# Patient Record
Sex: FEMALE | Race: WHITE | NOT HISPANIC OR LATINO | Employment: OTHER | URBAN - METROPOLITAN AREA
[De-identification: names, ages, dates, MRNs, and addresses within clinical notes are randomized per-mention and may not be internally consistent; named-entity substitution may affect disease eponyms.]

---

## 2017-08-10 ENCOUNTER — OFFICE VISIT (OUTPATIENT)
Dept: OBSTETRICS AND GYNECOLOGY | Facility: CLINIC | Age: 27
End: 2017-08-10
Attending: OBSTETRICS & GYNECOLOGY
Payer: COMMERCIAL

## 2017-08-10 VITALS
DIASTOLIC BLOOD PRESSURE: 80 MMHG | WEIGHT: 126.19 LBS | SYSTOLIC BLOOD PRESSURE: 124 MMHG | BODY MASS INDEX: 21.02 KG/M2 | HEIGHT: 65 IN

## 2017-08-10 DIAGNOSIS — Z01.419 ENCOUNTER FOR GYNECOLOGICAL EXAMINATION: Primary | ICD-10-CM

## 2017-08-10 DIAGNOSIS — N60.19 FIBROCYSTIC BREAST, UNSPECIFIED LATERALITY: ICD-10-CM

## 2017-08-10 DIAGNOSIS — Z11.3 SCREENING FOR VENEREAL DISEASE: ICD-10-CM

## 2017-08-10 DIAGNOSIS — Z12.4 SCREENING FOR MALIGNANT NEOPLASM OF THE CERVIX: ICD-10-CM

## 2017-08-10 PROCEDURE — 87591 N.GONORRHOEAE DNA AMP PROB: CPT

## 2017-08-10 PROCEDURE — 88142 CYTOPATH C/V THIN LAYER: CPT

## 2017-08-10 PROCEDURE — 99385 PREV VISIT NEW AGE 18-39: CPT | Mod: S$GLB,,, | Performed by: OBSTETRICS & GYNECOLOGY

## 2017-08-10 PROCEDURE — 99999 PR PBB SHADOW E&M-NEW PATIENT-LVL III: CPT | Mod: PBBFAC,,, | Performed by: OBSTETRICS & GYNECOLOGY

## 2017-08-10 RX ORDER — NORETHINDRONE ACETATE AND ETHINYL ESTRADIOL, AND FERROUS FUMARATE 1MG-20(24)
1 KIT ORAL DAILY
Qty: 84 CAPSULE | Refills: 3 | Status: SHIPPED | OUTPATIENT
Start: 2017-08-10 | End: 2018-12-10

## 2017-08-10 RX ORDER — ALPRAZOLAM 0.5 MG/1
TABLET, EXTENDED RELEASE ORAL DAILY
COMMUNITY
End: 2020-01-21

## 2017-08-10 RX ORDER — NORETHINDRONE ACETATE AND ETHINYL ESTRADIOL, AND FERROUS FUMARATE 1MG-20(24)
1 KIT ORAL DAILY
Qty: 84 CAPSULE | Refills: 3 | Status: CANCELLED | OUTPATIENT
Start: 2017-08-10

## 2017-08-10 NOTE — PROGRESS NOTES
"CC: Well woman exam    Jane Mann is a 27 y.o. female  presents for a well woman exam.  She is not established.  Referred by a friend.  Recent move back to [facility].  Wants to get on ocps. Has never been on pills and wants to try that first line.  Sexually active wants std cx done.  Normal menses.  S/p gastric sleeve without complication besides having hair loss.      Past Medical History:   Diagnosis Date    Anxiety        Past Surgical History:   Procedure Laterality Date    STOMACH SURGERY      Dr Owens - sleeve gastrectomy - was 190lbs       OB History    Para Term  AB Living   0 0 0 0 0 0   SAB TAB Ectopic Multiple Live Births   0 0 0 0 0             Family History   Problem Relation Age of Onset    Heart attack Father     Hypertension Father        Social History   Substance Use Topics    Smoking status: Former Smoker    Smokeless tobacco: Never Used    Alcohol use Yes      Comment: social       /80   Ht 5' 5" (1.651 m)   Wt 57.2 kg (126 lb 3.4 oz)   LMP 2017 (LMP Unknown)   BMI 21.00 kg/m²     ROS:  GENERAL: Denies weight gain or weight loss. Feeling well overall.   SKIN: Denies rash or lesions.   HEAD: Denies head injury or headache.   NODES: Denies enlarged lymph nodes.   CHEST: Denies chest pain or shortness of breath.   CARDIOVASCULAR: Denies palpitations or left sided chest pain.   ABDOMEN: No abdominal pain, constipation, diarrhea, nausea, vomiting or rectal bleeding.   URINARY: No frequency, dysuria, hematuria, or burning on urination.  REPRODUCTIVE: See HPI.   BREASTS: The patient performs breast self-examination and denies pain, lumps, or nipple discharge.   HEMATOLOGIC: No easy bruisability or excessive bleeding.  MUSCULOSKELETAL: Denies joint pain or swelling.   NEUROLOGIC: Denies syncope or weakness.   PSYCHIATRIC: Denies depression, anxiety or mood swings.    Physical Exam:    APPEARANCE: Well nourished, well developed, in no acute " distress.  AFFECT: WNL, alert and oriented x 3  SKIN: No acne or hirsutism  NECK: Neck symmetric without masses or thyromegaly  NODES: No inguinal, cervical, axillary, or femoral lymph node enlargement  CHEST: Good respiratory effect  ABDOMEN: Soft.  No tenderness or masses.  No hepatosplenomegaly.  No hernias.  BREASTS: Symmetrical, no skin changes or visible lesions.  No palpable masses, nipple discharge bilaterally.  PELVIC: Normal external genitalia without lesions.  Normal hair distribution.  Adequate perineal body, normal urethral meatus.  Vagina moist and well rugated without lesions or discharge.  Cervix pink, without lesions, discharge or tenderness.  No significant cystocele or rectocele.  Bimanual exam shows uterus to be normal size, regular, mobile and nontender.  Adnexa without masses or tenderness.    EXTREMITIES: No edema.    ASSESSMENT AND PLAN  1. Encounter for gynecological examination  norethindrone-e.estradiol-iron (TAYTULLA) 1 mg-20 mcg (24)/75 mg (4) Cap   2. Screening for malignant neoplasm of the cervix  Liquid-based pap smear, screening   3. Screening for venereal disease  C. trachomatis/N. gonorrhoeae by AMP DNA Vagina   4. Fibrocystic breast, unspecified laterality  US Breast Bilateral Complete       Patient was counseled today on A.C.S. Pap guidelines and recommendations for yearly pelvic exams, mammograms and monthly self breast exams; to see her PCP for other health maintenance.     Recommend Dr Carmen Saldivar for hairloss.  Also diffuse fibrocystic breast changes recommend bilateral ultrasound.    Return in about 1 year (around 8/10/2018).

## 2017-08-11 LAB
C TRACH DNA SPEC QL NAA+PROBE: NOT DETECTED
N GONORRHOEA DNA SPEC QL NAA+PROBE: NOT DETECTED

## 2017-08-28 ENCOUNTER — HOSPITAL ENCOUNTER (OUTPATIENT)
Dept: RADIOLOGY | Facility: OTHER | Age: 27
Discharge: HOME | End: 2017-08-28
Attending: OBSTETRICS & GYNECOLOGY
Payer: COMMERCIAL

## 2017-08-28 DIAGNOSIS — N60.19 FIBROCYSTIC BREAST, UNSPECIFIED LATERALITY: ICD-10-CM

## 2017-08-28 PROCEDURE — 76642 ULTRASOUND BREAST LIMITED: CPT | Mod: 26,50,, | Performed by: RADIOLOGY

## 2017-08-28 PROCEDURE — 76641 ULTRASOUND BREAST COMPLETE: CPT | Mod: TC,50

## 2017-08-28 PROCEDURE — 76642 ULTRASOUND BREAST LIMITED: CPT | Mod: TC,50

## 2018-01-19 ENCOUNTER — TELEPHONE (OUTPATIENT)
Dept: OBSTETRICS AND GYNECOLOGY | Facility: CLINIC | Age: 28
End: 2018-01-19

## 2018-01-19 NOTE — TELEPHONE ENCOUNTER
The list that came through you could not read.I had pt send a different way and would write some of the medications done. Miguel, Levonorgest-est% eth est, phoebe, amethia lo, camrese, ashlyna, quaasense, lyza, norethindrone oral.

## 2018-01-19 NOTE — TELEPHONE ENCOUNTER
Wax pt calling, pt states has love her [place] but coast to much [phone] and wants to know if we can send something else in for her today.Pt needs to start pills this weekend and she emailed me a list but to small to see.I will email pt back to get a better copy for whats covered with Bc/Bs.Please call pt either way if something can be sent in or not.Pt # [phone]

## 2018-09-25 ENCOUNTER — TELEPHONE (OUTPATIENT)
Dept: OBSTETRICS AND GYNECOLOGY | Facility: CLINIC | Age: 28
End: 2018-09-25

## 2018-09-25 NOTE — TELEPHONE ENCOUNTER
Pt missed Saturday's pill in week 3 and doubled up Sunday.  She had unprotected sex Saturday night and Sunday, recommended Plan B as she could have ovulated.  She has been on Blisovi for the past year. Since starting it she has had vaginal dryness that causes painful intercourse.         Allergies and pharmacy UTD

## 2018-09-25 NOTE — TELEPHONE ENCOUNTER
Plan b is best taken within 72 hours and at this point it is almost too late however you can take plan b up to 5 days after unprotected sex but just has lower success.   Recommend use condoms and take upt in 2 weeks.

## 2018-09-28 RX ORDER — NORETHINDRONE ACETATE AND ETHINYL ESTRADIOL 1MG-20(24)
KIT ORAL
Qty: 84 TABLET | Refills: 2 | Status: SHIPPED | OUTPATIENT
Start: 2018-09-28 | End: 2018-12-10 | Stop reason: SDUPTHER

## 2018-12-10 ENCOUNTER — OFFICE VISIT (OUTPATIENT)
Dept: OBSTETRICS AND GYNECOLOGY | Facility: CLINIC | Age: 28
End: 2018-12-10
Attending: OBSTETRICS & GYNECOLOGY
Payer: COMMERCIAL

## 2018-12-10 VITALS
HEIGHT: 65 IN | BODY MASS INDEX: 20.2 KG/M2 | SYSTOLIC BLOOD PRESSURE: 120 MMHG | WEIGHT: 121.25 LBS | DIASTOLIC BLOOD PRESSURE: 72 MMHG

## 2018-12-10 DIAGNOSIS — Z01.419 ENCOUNTER FOR GYNECOLOGICAL EXAMINATION: Primary | ICD-10-CM

## 2018-12-10 DIAGNOSIS — Z30.9 ENCOUNTER FOR CONTRACEPTIVE MANAGEMENT, UNSPECIFIED TYPE: ICD-10-CM

## 2018-12-10 PROCEDURE — 99999 PR PBB SHADOW E&M-EST. PATIENT-LVL II: CPT | Mod: PBBFAC,,, | Performed by: OBSTETRICS & GYNECOLOGY

## 2018-12-10 PROCEDURE — 99395 PREV VISIT EST AGE 18-39: CPT | Mod: S$GLB,,, | Performed by: OBSTETRICS & GYNECOLOGY

## 2018-12-10 RX ORDER — SERTRALINE HYDROCHLORIDE 50 MG/1
TABLET, FILM COATED ORAL
COMMUNITY
Start: 2018-10-25 | End: 2020-01-21

## 2018-12-10 NOTE — PROGRESS NOTES
"CC: Well woman exam    Jane Mann is a 28 y.o. female  presents for a well woman exam.  On continuous ocps bc doesn't want a cycle, h/o normal menses. Getting  in April in [facility].  Wants to get pregnant maybe a year later.  Worried bc friends dealing with infertility.     C/o dryness with sex    Past Medical History:   Diagnosis Date    Anxiety        Past Surgical History:   Procedure Laterality Date    STOMACH SURGERY      Dr Owens - sleeve gastrectomy - was 190lbs       OB History    Para Term  AB Living   0 0 0 0 0 0   SAB TAB Ectopic Multiple Live Births   0 0 0 0 0             Family History   Problem Relation Age of Onset    Heart attack Father     Hypertension Father     Breast cancer Neg Hx     Colon cancer Neg Hx     Ovarian cancer Neg Hx        Social History     Tobacco Use    Smoking status: Former Smoker    Smokeless tobacco: Never Used   Substance Use Topics    Alcohol use: Yes     Comment: social    Drug use: No       /72   Ht 5' 5" (1.651 m)   Wt 55 kg (121 lb 4.1 oz)   LMP  (LMP Unknown) Comment: no cycles with OCPs  BMI 20.18 kg/m²     ROS:  GENERAL: Denies weight gain or weight loss. Feeling well overall.   SKIN: Denies rash or lesions.   HEAD: Denies head injury or headache.   NODES: Denies enlarged lymph nodes.   CHEST: Denies chest pain or shortness of breath.   CARDIOVASCULAR: Denies palpitations or left sided chest pain.   ABDOMEN: No abdominal pain, constipation, diarrhea, nausea, vomiting or rectal bleeding.   URINARY: No frequency, dysuria, hematuria, or burning on urination.  REPRODUCTIVE: See HPI.   BREASTS: The patient performs breast self-examination and denies pain, lumps, or nipple discharge.   HEMATOLOGIC: No easy bruisability or excessive bleeding.  MUSCULOSKELETAL: Denies joint pain or swelling.   NEUROLOGIC: Denies syncope or weakness.   PSYCHIATRIC: Denies depression, anxiety or mood swings.    Physical " Exam:    APPEARANCE: Well nourished, well developed, in no acute distress.  AFFECT: WNL, alert and oriented x 3  SKIN: No acne or hirsutism  NECK: Neck symmetric without masses or thyromegaly  NODES: No inguinal, cervical, axillary, or femoral lymph node enlargement  CHEST: Good respiratory effect  ABDOMEN: Soft.  No tenderness or masses.  No hepatosplenomegaly.  No hernias.  BREASTS: Symmetrical, no skin changes or visible lesions.  No palpable masses, nipple discharge bilaterally.  PELVIC: Normal external genitalia without lesions.  Normal hair distribution.  Adequate perineal body, normal urethral meatus.  Vagina moist and well rugated without lesions or discharge.  Cervix pink, without lesions, discharge or tenderness.  No significant cystocele or rectocele.  Bimanual exam shows uterus to be normal size, regular, mobile and nontender.  Adnexa without masses or tenderness.    EXTREMITIES: No edema.    ASSESSMENT AND PLAN  1. Encounter for gynecological examination     2. Encounter for contraceptive management, unspecified type  norethindrone-e.estradiol-iron (BLISOVI 24 FE) 1 mg-20 mcg (24)/75 mg (4) Oral per tablet       Patient was counseled today on A.C.S. Pap guidelines and recommendations for yearly pelvic exams, mammograms and monthly self breast exams; to see her PCP for other health maintenance.     Stay ocps when ready come in for preconceptual counseling.  Use almond oil.    Follow-up in about 1 year (around 12/10/2019).

## 2019-10-03 ENCOUNTER — OFFICE VISIT (OUTPATIENT)
Dept: OBSTETRICS AND GYNECOLOGY | Facility: CLINIC | Age: 29
End: 2019-10-03
Attending: OBSTETRICS & GYNECOLOGY
Payer: COMMERCIAL

## 2019-10-03 ENCOUNTER — LAB VISIT (OUTPATIENT)
Dept: LAB | Facility: OTHER | Age: 29
End: 2019-10-03
Attending: OBSTETRICS & GYNECOLOGY
Payer: COMMERCIAL

## 2019-10-03 VITALS — DIASTOLIC BLOOD PRESSURE: 80 MMHG | BODY MASS INDEX: 22.38 KG/M2 | WEIGHT: 134.5 LBS | SYSTOLIC BLOOD PRESSURE: 110 MMHG

## 2019-10-03 DIAGNOSIS — Z00.00 ROUTINE GENERAL MEDICAL EXAMINATION AT A HEALTH CARE FACILITY: ICD-10-CM

## 2019-10-03 DIAGNOSIS — Z12.4 PAP SMEAR FOR CERVICAL CANCER SCREENING: ICD-10-CM

## 2019-10-03 DIAGNOSIS — Z01.419 ENCOUNTER FOR GYNECOLOGICAL EXAMINATION: Primary | ICD-10-CM

## 2019-10-03 LAB
ALBUMIN SERPL BCP-MCNC: 4.2 G/DL (ref 3.5–5.2)
ALP SERPL-CCNC: 72 U/L (ref 55–135)
ALT SERPL W/O P-5'-P-CCNC: 27 U/L (ref 10–44)
ANION GAP SERPL CALC-SCNC: 9 MMOL/L (ref 8–16)
AST SERPL-CCNC: 26 U/L (ref 10–40)
BASOPHILS # BLD AUTO: 0.07 K/UL (ref 0–0.2)
BASOPHILS NFR BLD: 0.9 % (ref 0–1.9)
BILIRUB SERPL-MCNC: 0.7 MG/DL (ref 0.1–1)
BUN SERPL-MCNC: 13 MG/DL (ref 6–20)
CALCIUM SERPL-MCNC: 9.7 MG/DL (ref 8.7–10.5)
CHLORIDE SERPL-SCNC: 102 MMOL/L (ref 95–110)
CO2 SERPL-SCNC: 25 MMOL/L (ref 23–29)
CREAT SERPL-MCNC: 0.8 MG/DL (ref 0.5–1.4)
DIFFERENTIAL METHOD: NORMAL
EOSINOPHIL # BLD AUTO: 0.2 K/UL (ref 0–0.5)
EOSINOPHIL NFR BLD: 2.5 % (ref 0–8)
ERYTHROCYTE [DISTWIDTH] IN BLOOD BY AUTOMATED COUNT: 12.2 % (ref 11.5–14.5)
EST. GFR  (AFRICAN AMERICAN): >60 ML/MIN/1.73 M^2
EST. GFR  (NON AFRICAN AMERICAN): >60 ML/MIN/1.73 M^2
GLUCOSE SERPL-MCNC: 79 MG/DL (ref 70–110)
HCT VFR BLD AUTO: 39 % (ref 37–48.5)
HGB BLD-MCNC: 12.6 G/DL (ref 12–16)
IMM GRANULOCYTES # BLD AUTO: 0.02 K/UL (ref 0–0.04)
IMM GRANULOCYTES NFR BLD AUTO: 0.2 % (ref 0–0.5)
LYMPHOCYTES # BLD AUTO: 2.7 K/UL (ref 1–4.8)
LYMPHOCYTES NFR BLD: 32.8 % (ref 18–48)
MCH RBC QN AUTO: 30.3 PG (ref 27–31)
MCHC RBC AUTO-ENTMCNC: 32.3 G/DL (ref 32–36)
MCV RBC AUTO: 94 FL (ref 82–98)
MONOCYTES # BLD AUTO: 0.7 K/UL (ref 0.3–1)
MONOCYTES NFR BLD: 8.7 % (ref 4–15)
NEUTROPHILS # BLD AUTO: 4.4 K/UL (ref 1.8–7.7)
NEUTROPHILS NFR BLD: 54.9 % (ref 38–73)
NRBC BLD-RTO: 0 /100 WBC
PLATELET # BLD AUTO: 311 K/UL (ref 150–350)
PMV BLD AUTO: 11 FL (ref 9.2–12.9)
POTASSIUM SERPL-SCNC: 5.1 MMOL/L (ref 3.5–5.1)
PROT SERPL-MCNC: 7.8 G/DL (ref 6–8.4)
RBC # BLD AUTO: 4.16 M/UL (ref 4–5.4)
SODIUM SERPL-SCNC: 136 MMOL/L (ref 136–145)
TSH SERPL DL<=0.005 MIU/L-ACNC: 1.79 UIU/ML (ref 0.4–4)
WBC # BLD AUTO: 8.08 K/UL (ref 3.9–12.7)

## 2019-10-03 PROCEDURE — 36415 COLL VENOUS BLD VENIPUNCTURE: CPT

## 2019-10-03 PROCEDURE — 80053 COMPREHEN METABOLIC PANEL: CPT

## 2019-10-03 PROCEDURE — 99999 PR PBB SHADOW E&M-EST. PATIENT-LVL III: ICD-10-PCS | Mod: PBBFAC,,, | Performed by: OBSTETRICS & GYNECOLOGY

## 2019-10-03 PROCEDURE — 82306 VITAMIN D 25 HYDROXY: CPT

## 2019-10-03 PROCEDURE — 85025 COMPLETE CBC W/AUTO DIFF WBC: CPT

## 2019-10-03 PROCEDURE — 88175 CYTOPATH C/V AUTO FLUID REDO: CPT

## 2019-10-03 PROCEDURE — 82607 VITAMIN B-12: CPT

## 2019-10-03 PROCEDURE — 99999 PR PBB SHADOW E&M-EST. PATIENT-LVL III: CPT | Mod: PBBFAC,,, | Performed by: OBSTETRICS & GYNECOLOGY

## 2019-10-03 PROCEDURE — 82746 ASSAY OF FOLIC ACID SERUM: CPT

## 2019-10-03 PROCEDURE — 84443 ASSAY THYROID STIM HORMONE: CPT

## 2019-10-03 PROCEDURE — 99395 PR PREVENTIVE VISIT,EST,18-39: ICD-10-PCS | Mod: S$GLB,,, | Performed by: OBSTETRICS & GYNECOLOGY

## 2019-10-03 PROCEDURE — 99395 PREV VISIT EST AGE 18-39: CPT | Mod: S$GLB,,, | Performed by: OBSTETRICS & GYNECOLOGY

## 2019-10-03 NOTE — PROGRESS NOTES
CC: Well woman exam    Jane Muller is a 29 y.o. female  presents for a well woman exam.      Just off ocps for one month. Taking pnv, wants to get pregnant.  She is also on extra supplements due to history of gastric sleeve    Currently not working and her  works as an .    Past Medical History:   Diagnosis Date    Anxiety        Past Surgical History:   Procedure Laterality Date    STOMACH SURGERY  2017    Dr Owens - sleeve gastrectomy - was 190lbs       OB History    Para Term  AB Living   0 0 0 0 0 0   SAB TAB Ectopic Multiple Live Births   0 0 0 0 0       Family History   Problem Relation Age of Onset    Heart attack Father     Hypertension Father     No Known Problems Brother     Breast cancer Neg Hx     Colon cancer Neg Hx     Ovarian cancer Neg Hx        Social History     Tobacco Use    Smoking status: Former Smoker    Smokeless tobacco: Never Used   Substance Use Topics    Alcohol use: Yes     Comment: social    Drug use: No       /80   Wt 61 kg (134 lb 7.7 oz)   LMP 2019   BMI 22.38 kg/m²     ROS:  GENERAL: Denies weight gain or weight loss. Feeling well overall.   SKIN: Denies rash or lesions.   HEAD: Denies head injury or headache.   NODES: Denies enlarged lymph nodes.   CHEST: Denies chest pain or shortness of breath.   CARDIOVASCULAR: Denies palpitations or left sided chest pain.   ABDOMEN: No abdominal pain, constipation, diarrhea, nausea, vomiting or rectal bleeding.   URINARY: No frequency, dysuria, hematuria, or burning on urination.  REPRODUCTIVE: See HPI.   BREASTS: The patient performs breast self-examination and denies pain, lumps, or nipple discharge.   HEMATOLOGIC: No easy bruisability or excessive bleeding.  MUSCULOSKELETAL: Denies joint pain or swelling.   NEUROLOGIC: Denies syncope or weakness.   PSYCHIATRIC: Denies depression, anxiety or mood swings.    Physical Exam:    APPEARANCE: Well nourished, well developed,  in no acute distress.  AFFECT: WNL, alert and oriented x 3  SKIN: No acne or hirsutism  NECK: Neck symmetric without masses or thyromegaly  NODES: No inguinal, cervical, axillary, or femoral lymph node enlargement  CHEST: Good respiratory effect  ABDOMEN: Soft.  No tenderness or masses.  No hepatosplenomegaly.  No hernias.  BREASTS: Symmetrical, no skin changes or visible lesions.  No palpable masses, nipple discharge bilaterally.  PELVIC: Normal external genitalia without lesions.  Normal hair distribution.  Adequate perineal body, normal urethral meatus.  Vagina moist and well rugated without lesions or discharge.  Cervix pink, without lesions, discharge or tenderness.  No significant cystocele or rectocele.  Bimanual exam shows uterus to be normal size, regular, mobile and nontender.  Adnexa without masses or tenderness.    EXTREMITIES: No edema.    ASSESSMENT AND PLAN  1. Encounter for gynecological examination     2. Routine general medical examination at a health care facility  Vitamin D    CBC auto differential    Comprehensive metabolic panel    TSH    FOLATE    VITAMIN B12   3. Pap smear for cervical cancer screening  Liquid-based pap smear, screening       Patient was counseled today on A.C.S. Pap guidelines and recommendations for yearly pelvic exams, mammograms and monthly self breast exams; to see her PCP for other health maintenance.     Follow up in about 1 year (around 10/3/2020).

## 2019-10-04 LAB
25(OH)D3+25(OH)D2 SERPL-MCNC: 29 NG/ML (ref 30–96)
FOLATE SERPL-MCNC: 7.3 NG/ML (ref 4–24)
VIT B12 SERPL-MCNC: 555 PG/ML (ref 210–950)

## 2019-10-10 ENCOUNTER — PATIENT MESSAGE (OUTPATIENT)
Dept: OBSTETRICS AND GYNECOLOGY | Facility: CLINIC | Age: 29
End: 2019-10-10

## 2019-10-10 DIAGNOSIS — N91.2 ABSENT MENSES: Primary | ICD-10-CM

## 2019-10-10 NOTE — TELEPHONE ENCOUNTER
Pt was seen on 10/3/19 and is trying to conceive. She d/c her OCP two months ago and has not gotten her period since. She states that she did bleed right after d/c OCP but thinks it was just a withdraw bleed and not a period. She was on this OCP for 2 years and prior to being on contraception she was having regular periods. She has taken at home UPT and they have come back negative.

## 2019-10-10 NOTE — TELEPHONE ENCOUNTER
Left detailed voicemail informing pt about Dr. Brandt wanting her to come in office next week for ultrasound. Offered appt on 10/18 at 2pm in [place] for u/s and then to f/u with Dr. Brandt afterwards. Also sent message to pt on portal.

## 2020-01-06 ENCOUNTER — PATIENT MESSAGE (OUTPATIENT)
Dept: OBSTETRICS AND GYNECOLOGY | Facility: CLINIC | Age: 30
End: 2020-01-06

## 2020-01-21 ENCOUNTER — OFFICE VISIT (OUTPATIENT)
Dept: OBSTETRICS AND GYNECOLOGY | Facility: CLINIC | Age: 30
End: 2020-01-21
Payer: COMMERCIAL

## 2020-01-21 VITALS
SYSTOLIC BLOOD PRESSURE: 110 MMHG | HEIGHT: 65 IN | BODY MASS INDEX: 22.77 KG/M2 | WEIGHT: 136.69 LBS | DIASTOLIC BLOOD PRESSURE: 60 MMHG

## 2020-01-21 DIAGNOSIS — Z78.9 TRYING TO GET PREGNANT: Primary | ICD-10-CM

## 2020-01-21 PROCEDURE — 99213 PR OFFICE/OUTPT VISIT, EST, LEVL III, 20-29 MIN: ICD-10-PCS | Mod: S$GLB,,, | Performed by: OBSTETRICS & GYNECOLOGY

## 2020-01-21 PROCEDURE — 3008F PR BODY MASS INDEX (BMI) DOCUMENTED: ICD-10-PCS | Mod: CPTII,S$GLB,, | Performed by: OBSTETRICS & GYNECOLOGY

## 2020-01-21 PROCEDURE — 99213 OFFICE O/P EST LOW 20 MIN: CPT | Mod: S$GLB,,, | Performed by: OBSTETRICS & GYNECOLOGY

## 2020-01-21 PROCEDURE — 3008F BODY MASS INDEX DOCD: CPT | Mod: CPTII,S$GLB,, | Performed by: OBSTETRICS & GYNECOLOGY

## 2020-01-21 PROCEDURE — 99999 PR PBB SHADOW E&M-EST. PATIENT-LVL III: ICD-10-PCS | Mod: PBBFAC,,, | Performed by: OBSTETRICS & GYNECOLOGY

## 2020-01-21 PROCEDURE — 99999 PR PBB SHADOW E&M-EST. PATIENT-LVL III: CPT | Mod: PBBFAC,,, | Performed by: OBSTETRICS & GYNECOLOGY

## 2020-01-21 NOTE — PROGRESS NOTES
"CC: trying to get pregnant x 6months    Jane Muller is a 29 y.o. female  presents with her mother.  Worried because has not gotten pregnant.  Her cycle length has become more prolonged.  34d, 32d, 36d, currently CD32,  Not getting +opk, no mucous production around ovulation.  ,  healthy no children. No h/o stds.  No postcoital bleeding, taking pnv.     Past Medical History:   Diagnosis Date    Anxiety        Past Surgical History:   Procedure Laterality Date    STOMACH SURGERY      Dr Owens - sleeve gastrectomy - was 190lbs       OB History    Para Term  AB Living   0 0 0 0 0 0   SAB TAB Ectopic Multiple Live Births   0 0 0 0 0       Family History   Problem Relation Age of Onset    Heart attack Father     Hypertension Father     No Known Problems Brother     Breast cancer Neg Hx     Colon cancer Neg Hx     Ovarian cancer Neg Hx        Social History     Tobacco Use    Smoking status: Former Smoker    Smokeless tobacco: Never Used   Substance Use Topics    Alcohol use: Yes     Comment: social    Drug use: No       /60   Ht 5' 5" (1.651 m)   Wt 62 kg (136 lb 11 oz)   LMP 2019   BMI 22.75 kg/m²     ROS:  GENERAL: Denies weight gain or weight loss. Feeling well overall.   SKIN: Denies rash or lesions.   HEAD: Denies head injury or headache.   NODES: Denies enlarged lymph nodes.   CHEST: Denies chest pain or shortness of breath.   CARDIOVASCULAR: Denies palpitations or left sided chest pain.   ABDOMEN: No abdominal pain, constipation, diarrhea, nausea, vomiting or rectal bleeding.   URINARY: No frequency, dysuria, hematuria, or burning on urination.  REPRODUCTIVE: See HPI.   BREASTS: denies pain, lumps, or nipple discharge.   HEMATOLOGIC: No easy bruisability or excessive bleeding.  MUSCULOSKELETAL: Denies joint pain or swelling.   NEUROLOGIC: Denies syncope or weakness.   PSYCHIATRIC: Denies depression, anxiety or mood swings.    Physical " Exam:    APPEARANCE: Well nourished, well developed, in no acute distress.  AFFECT: WNL, alert and oriented x 3  SKIN: No acne or hirsutism  NECK: Neck symmetric without masses or thyromegaly  NODES: No inguinal, cervical, axillary, or femoral lymph node enlargement  CHEST: Good respiratory effect  ABDOMEN: Soft.  No tenderness or masses.  No hepatosplenomegaly.  No hernias.  EXTREMITIES: No edema.    ASSESSMENT AND PLAN    Jane was seen today for consult.    Diagnoses and all orders for this visit:    Trying to get pregnant    concerned for pcos. Has been on ocps for a few years and never had cycle issues.  Would like to be aggressive and see AN before her mom leaves [facility]. Info given    Follow up if symptoms worsen or fail to improve.

## 2020-02-27 ENCOUNTER — TELEPHONE (OUTPATIENT)
Dept: OBSTETRICS AND GYNECOLOGY | Facility: CLINIC | Age: 30
End: 2020-02-27

## 2020-02-27 NOTE — TELEPHONE ENCOUNTER
[facility] pt, has an appt scheduled on 03/05 for routine OB care from Dr. Correa. Pt ios requesting [id number] order. Pt was told she could do testing starting at 9 wks. Please call pt and advise, thank you.

## 2020-02-27 NOTE — TELEPHONE ENCOUNTER
Pt. Coming in for her ist OB visit, and wants MT21 I explained to her we will go over it with dr. Brandt when she comes in, she said okay.

## 2020-03-05 ENCOUNTER — PROCEDURE VISIT (OUTPATIENT)
Dept: OBSTETRICS AND GYNECOLOGY | Facility: CLINIC | Age: 30
End: 2020-03-05
Attending: OBSTETRICS & GYNECOLOGY
Payer: COMMERCIAL

## 2020-03-05 VITALS
DIASTOLIC BLOOD PRESSURE: 60 MMHG | BODY MASS INDEX: 22.69 KG/M2 | WEIGHT: 136.38 LBS | SYSTOLIC BLOOD PRESSURE: 100 MMHG

## 2020-03-05 DIAGNOSIS — Z34.90 PREGNANCY, UNSPECIFIED GESTATIONAL AGE: ICD-10-CM

## 2020-03-05 DIAGNOSIS — Z34.90 PREGNANCY, UNSPECIFIED GESTATIONAL AGE: Primary | ICD-10-CM

## 2020-03-05 DIAGNOSIS — Z98.84 HISTORY OF WEIGHT LOSS SURGERY: ICD-10-CM

## 2020-03-05 DIAGNOSIS — O21.9 NAUSEA/VOMITING IN PREGNANCY: ICD-10-CM

## 2020-03-05 DIAGNOSIS — Z36.89 ESTABLISH GESTATIONAL AGE, ULTRASOUND: ICD-10-CM

## 2020-03-05 PROCEDURE — 0502F SUBSEQUENT PRENATAL CARE: CPT | Mod: CPTII,S$GLB,, | Performed by: OBSTETRICS & GYNECOLOGY

## 2020-03-05 PROCEDURE — 99999 PR PBB SHADOW E&M-EST. PATIENT-LVL II: CPT | Mod: PBBFAC,,, | Performed by: OBSTETRICS & GYNECOLOGY

## 2020-03-05 PROCEDURE — 76801 PR US, OB <14WKS, TRANSABD, SINGLE GESTATION: ICD-10-PCS | Mod: S$GLB,,, | Performed by: OBSTETRICS & GYNECOLOGY

## 2020-03-05 PROCEDURE — 0502F PR SUBSEQUENT PRENATAL CARE: ICD-10-PCS | Mod: CPTII,S$GLB,, | Performed by: OBSTETRICS & GYNECOLOGY

## 2020-03-05 PROCEDURE — 87086 URINE CULTURE/COLONY COUNT: CPT

## 2020-03-05 PROCEDURE — 99999 PR PBB SHADOW E&M-EST. PATIENT-LVL II: ICD-10-PCS | Mod: PBBFAC,,, | Performed by: OBSTETRICS & GYNECOLOGY

## 2020-03-05 PROCEDURE — 87491 CHLMYD TRACH DNA AMP PROBE: CPT

## 2020-03-05 PROCEDURE — 76801 OB US < 14 WKS SINGLE FETUS: CPT | Mod: S$GLB,,, | Performed by: OBSTETRICS & GYNECOLOGY

## 2020-03-05 RX ORDER — ONDANSETRON 8 MG/1
8 TABLET, ORALLY DISINTEGRATING ORAL EVERY 6 HOURS PRN
Qty: 30 TABLET | Refills: 0 | Status: SHIPPED | OUTPATIENT
Start: 2020-03-05 | End: 2020-08-06

## 2020-03-05 NOTE — PROGRESS NOTES
Dates by today's ultrasound.  +nausea/vomiting rx for bonjesta.  Wants materna 21 and labs.  Had carrier screen and was negative.

## 2020-03-06 LAB
C TRACH DNA SPEC QL NAA+PROBE: NOT DETECTED
N GONORRHOEA DNA SPEC QL NAA+PROBE: NOT DETECTED

## 2020-03-07 LAB — BACTERIA UR CULT: NO GROWTH

## 2020-03-09 ENCOUNTER — LAB VISIT (OUTPATIENT)
Dept: LAB | Facility: OTHER | Age: 30
End: 2020-03-09
Attending: OBSTETRICS & GYNECOLOGY
Payer: COMMERCIAL

## 2020-03-09 DIAGNOSIS — Z34.90 PREGNANCY, UNSPECIFIED GESTATIONAL AGE: ICD-10-CM

## 2020-03-09 LAB
25(OH)D3+25(OH)D2 SERPL-MCNC: 26 NG/ML (ref 30–96)
ABO + RH BLD: NORMAL
ALBUMIN SERPL BCP-MCNC: 3.9 G/DL (ref 3.5–5.2)
ALP SERPL-CCNC: 56 U/L (ref 55–135)
ALT SERPL W/O P-5'-P-CCNC: 15 U/L (ref 10–44)
ANION GAP SERPL CALC-SCNC: 10 MMOL/L (ref 8–16)
AST SERPL-CCNC: 19 U/L (ref 10–40)
BASOPHILS # BLD AUTO: 0.05 K/UL (ref 0–0.2)
BASOPHILS NFR BLD: 0.5 % (ref 0–1.9)
BILIRUB SERPL-MCNC: 0.4 MG/DL (ref 0.1–1)
BLD GP AB SCN CELLS X3 SERPL QL: NORMAL
BUN SERPL-MCNC: 10 MG/DL (ref 6–20)
CALCIUM SERPL-MCNC: 9.9 MG/DL (ref 8.7–10.5)
CHLORIDE SERPL-SCNC: 105 MMOL/L (ref 95–110)
CO2 SERPL-SCNC: 23 MMOL/L (ref 23–29)
CREAT SERPL-MCNC: 0.7 MG/DL (ref 0.5–1.4)
DIFFERENTIAL METHOD: NORMAL
EOSINOPHIL # BLD AUTO: 0.2 K/UL (ref 0–0.5)
EOSINOPHIL NFR BLD: 1.5 % (ref 0–8)
ERYTHROCYTE [DISTWIDTH] IN BLOOD BY AUTOMATED COUNT: 12.2 % (ref 11.5–14.5)
EST. GFR  (AFRICAN AMERICAN): >60 ML/MIN/1.73 M^2
EST. GFR  (NON AFRICAN AMERICAN): >60 ML/MIN/1.73 M^2
FOLATE SERPL-MCNC: 16.3 NG/ML (ref 4–24)
GLUCOSE SERPL-MCNC: 81 MG/DL (ref 70–110)
GLUCOSE SERPL-MCNC: 81 MG/DL (ref 70–110)
HCT VFR BLD AUTO: 38 % (ref 37–48.5)
HGB BLD-MCNC: 12.2 G/DL (ref 12–16)
IMM GRANULOCYTES # BLD AUTO: 0.04 K/UL (ref 0–0.04)
IMM GRANULOCYTES NFR BLD AUTO: 0.4 % (ref 0–0.5)
LYMPHOCYTES # BLD AUTO: 2.2 K/UL (ref 1–4.8)
LYMPHOCYTES NFR BLD: 21.1 % (ref 18–48)
MAGNESIUM SERPL-MCNC: 2 MG/DL (ref 1.6–2.6)
MCH RBC QN AUTO: 30.1 PG (ref 27–31)
MCHC RBC AUTO-ENTMCNC: 32.1 G/DL (ref 32–36)
MCV RBC AUTO: 94 FL (ref 82–98)
MONOCYTES # BLD AUTO: 0.8 K/UL (ref 0.3–1)
MONOCYTES NFR BLD: 7.8 % (ref 4–15)
NEUTROPHILS # BLD AUTO: 7.1 K/UL (ref 1.8–7.7)
NEUTROPHILS NFR BLD: 68.7 % (ref 38–73)
NRBC BLD-RTO: 0 /100 WBC
PLATELET # BLD AUTO: 267 K/UL (ref 150–350)
PMV BLD AUTO: 11.2 FL (ref 9.2–12.9)
POTASSIUM SERPL-SCNC: 3.8 MMOL/L (ref 3.5–5.1)
PROT SERPL-MCNC: 7.7 G/DL (ref 6–8.4)
RBC # BLD AUTO: 4.05 M/UL (ref 4–5.4)
SODIUM SERPL-SCNC: 138 MMOL/L (ref 136–145)
T3FREE SERPL-MCNC: 3.6 PG/ML (ref 2.3–4.2)
T4 FREE SERPL-MCNC: 0.93 NG/DL (ref 0.71–1.51)
TSH SERPL DL<=0.005 MIU/L-ACNC: 1.1 UIU/ML (ref 0.4–4)
VIT B12 SERPL-MCNC: 628 PG/ML (ref 210–950)
WBC # BLD AUTO: 10.34 K/UL (ref 3.9–12.7)

## 2020-03-09 PROCEDURE — 87340 HEPATITIS B SURFACE AG IA: CPT

## 2020-03-09 PROCEDURE — 80053 COMPREHEN METABOLIC PANEL: CPT

## 2020-03-09 PROCEDURE — 86762 RUBELLA ANTIBODY: CPT

## 2020-03-09 PROCEDURE — 82607 VITAMIN B-12: CPT

## 2020-03-09 PROCEDURE — 36415 COLL VENOUS BLD VENIPUNCTURE: CPT

## 2020-03-09 PROCEDURE — 82306 VITAMIN D 25 HYDROXY: CPT

## 2020-03-09 PROCEDURE — 83735 ASSAY OF MAGNESIUM: CPT

## 2020-03-09 PROCEDURE — 84481 FREE ASSAY (FT-3): CPT

## 2020-03-09 PROCEDURE — 86850 RBC ANTIBODY SCREEN: CPT

## 2020-03-09 PROCEDURE — 84439 ASSAY OF FREE THYROXINE: CPT

## 2020-03-09 PROCEDURE — 82746 ASSAY OF FOLIC ACID SERUM: CPT

## 2020-03-09 PROCEDURE — 86592 SYPHILIS TEST NON-TREP QUAL: CPT

## 2020-03-09 PROCEDURE — 85025 COMPLETE CBC W/AUTO DIFF WBC: CPT

## 2020-03-09 PROCEDURE — 86703 HIV-1/HIV-2 1 RESULT ANTBDY: CPT

## 2020-03-09 PROCEDURE — 84443 ASSAY THYROID STIM HORMONE: CPT

## 2020-03-10 ENCOUNTER — TELEPHONE (OUTPATIENT)
Dept: OBSTETRICS AND GYNECOLOGY | Facility: CLINIC | Age: 30
End: 2020-03-10

## 2020-03-10 LAB
HBV SURFACE AG SERPL QL IA: NEGATIVE
HIV 1+2 AB+HIV1 P24 AG SERPL QL IA: NEGATIVE
RPR SER QL: NORMAL
RUBV IGG SER-ACNC: 13.6 IU/ML
RUBV IGG SER-IMP: REACTIVE

## 2020-03-10 NOTE — TELEPHONE ENCOUNTER
----- Message from Erwin Brandt MD sent at 3/10/2020  4:28 PM CDT -----  Call patient make sure she got below message:     Your labs are normal except mildly low Vitamin D, if you are on a supplement you can continue.  If not can take Vit D3 5000 units daily, [employer] made is a brand you can trust.   Dr. Brandt

## 2020-03-11 ENCOUNTER — TELEPHONE (OUTPATIENT)
Dept: OBSTETRICS AND GYNECOLOGY | Facility: CLINIC | Age: 30
End: 2020-03-11

## 2020-03-16 ENCOUNTER — PATIENT MESSAGE (OUTPATIENT)
Dept: OBSTETRICS AND GYNECOLOGY | Facility: CLINIC | Age: 30
End: 2020-03-16

## 2020-03-16 NOTE — TELEPHONE ENCOUNTER
Pt. Notified with MT21 Negative results, and pt. Ask for the gender (Male) over the phone and wanted a copy mailed to her home address.

## 2020-03-18 ENCOUNTER — PATIENT MESSAGE (OUTPATIENT)
Dept: OBSTETRICS AND GYNECOLOGY | Facility: CLINIC | Age: 30
End: 2020-03-18

## 2020-03-18 ENCOUNTER — PATIENT MESSAGE (OUTPATIENT)
Dept: ADMINISTRATIVE | Facility: OTHER | Age: 30
End: 2020-03-18

## 2020-03-23 ENCOUNTER — TELEPHONE (OUTPATIENT)
Dept: OBSTETRICS AND GYNECOLOGY | Facility: CLINIC | Age: 30
End: 2020-03-23

## 2020-03-23 ENCOUNTER — PATIENT MESSAGE (OUTPATIENT)
Dept: OBSTETRICS AND GYNECOLOGY | Facility: CLINIC | Age: 30
End: 2020-03-23

## 2020-03-23 DIAGNOSIS — Z34.90 PREGNANCY, UNSPECIFIED GESTATIONAL AGE: Primary | ICD-10-CM

## 2020-03-23 NOTE — TELEPHONE ENCOUNTER
schedule her as a televisit for her next visit.  Not need to come in to unnecessarily expose herself.

## 2020-03-23 NOTE — TELEPHONE ENCOUNTER
L/M to call me back,   Pt. Needs telemed appt. April 3 and she will need a financial call from Ana or Zaira.

## 2020-03-23 NOTE — TELEPHONE ENCOUNTER
See Message:     Patient demanded visit and u/s , she said she didn/t care if she had to pay for it. She was not happy, and refused Telemed appt.

## 2020-03-26 ENCOUNTER — ROUTINE PRENATAL (OUTPATIENT)
Dept: OBSTETRICS AND GYNECOLOGY | Facility: CLINIC | Age: 30
End: 2020-03-26
Payer: COMMERCIAL

## 2020-03-26 ENCOUNTER — PROCEDURE VISIT (OUTPATIENT)
Dept: OBSTETRICS AND GYNECOLOGY | Facility: CLINIC | Age: 30
End: 2020-03-26
Payer: COMMERCIAL

## 2020-03-26 VITALS
DIASTOLIC BLOOD PRESSURE: 60 MMHG | WEIGHT: 135.94 LBS | SYSTOLIC BLOOD PRESSURE: 100 MMHG | BODY MASS INDEX: 22.62 KG/M2

## 2020-03-26 DIAGNOSIS — Z34.01 ENCOUNTER FOR SUPERVISION OF NORMAL FIRST PREGNANCY IN FIRST TRIMESTER: Primary | ICD-10-CM

## 2020-03-26 DIAGNOSIS — Z34.90 PREGNANCY, UNSPECIFIED GESTATIONAL AGE: ICD-10-CM

## 2020-03-26 PROCEDURE — 99499 NO LOS: ICD-10-PCS | Mod: S$GLB,,, | Performed by: PEDIATRICS

## 2020-03-26 PROCEDURE — 76801 PR US, OB <14WKS, TRANSABD, SINGLE GESTATION: ICD-10-PCS | Mod: S$GLB,,, | Performed by: PEDIATRICS

## 2020-03-26 PROCEDURE — 99499 UNLISTED E&M SERVICE: CPT | Mod: S$GLB,,, | Performed by: PEDIATRICS

## 2020-03-26 PROCEDURE — 76801 OB US < 14 WKS SINGLE FETUS: CPT | Mod: S$GLB,,, | Performed by: PEDIATRICS

## 2020-03-26 PROCEDURE — 99999 PR PBB SHADOW E&M-EST. PATIENT-LVL II: CPT | Mod: PBBFAC,,, | Performed by: OBSTETRICS & GYNECOLOGY

## 2020-03-26 PROCEDURE — 0502F PR SUBSEQUENT PRENATAL CARE: ICD-10-PCS | Mod: CPTII,S$GLB,, | Performed by: OBSTETRICS & GYNECOLOGY

## 2020-03-26 PROCEDURE — 0502F SUBSEQUENT PRENATAL CARE: CPT | Mod: CPTII,S$GLB,, | Performed by: OBSTETRICS & GYNECOLOGY

## 2020-03-26 PROCEDURE — 99999 PR PBB SHADOW E&M-EST. PATIENT-LVL II: ICD-10-PCS | Mod: PBBFAC,,, | Performed by: OBSTETRICS & GYNECOLOGY

## 2020-04-18 ENCOUNTER — PATIENT MESSAGE (OUTPATIENT)
Dept: OBSTETRICS AND GYNECOLOGY | Facility: CLINIC | Age: 30
End: 2020-04-18

## 2020-05-05 ENCOUNTER — ROUTINE PRENATAL (OUTPATIENT)
Dept: OBSTETRICS AND GYNECOLOGY | Facility: CLINIC | Age: 30
End: 2020-05-05
Payer: COMMERCIAL

## 2020-05-05 VITALS — SYSTOLIC BLOOD PRESSURE: 116 MMHG | WEIGHT: 138.25 LBS | BODY MASS INDEX: 23 KG/M2 | DIASTOLIC BLOOD PRESSURE: 68 MMHG

## 2020-05-05 DIAGNOSIS — Z34.02 ENCOUNTER FOR SUPERVISION OF NORMAL FIRST PREGNANCY IN SECOND TRIMESTER: Primary | ICD-10-CM

## 2020-05-05 DIAGNOSIS — Z3A.17 17 WEEKS GESTATION OF PREGNANCY: ICD-10-CM

## 2020-05-05 DIAGNOSIS — Z36.89 ENCOUNTER FOR FETAL ANATOMIC SURVEY: ICD-10-CM

## 2020-05-05 PROCEDURE — 0502F SUBSEQUENT PRENATAL CARE: CPT | Mod: CPTII,S$GLB,, | Performed by: OBSTETRICS & GYNECOLOGY

## 2020-05-05 PROCEDURE — 99999 PR PBB SHADOW E&M-EST. PATIENT-LVL II: CPT | Mod: PBBFAC,,, | Performed by: OBSTETRICS & GYNECOLOGY

## 2020-05-05 PROCEDURE — 99999 PR PBB SHADOW E&M-EST. PATIENT-LVL II: ICD-10-PCS | Mod: PBBFAC,,, | Performed by: OBSTETRICS & GYNECOLOGY

## 2020-05-05 PROCEDURE — 0502F PR SUBSEQUENT PRENATAL CARE: ICD-10-PCS | Mod: CPTII,S$GLB,, | Performed by: OBSTETRICS & GYNECOLOGY

## 2020-05-05 NOTE — PROGRESS NOTES
No complaints. Anatomy u/s ordered. Patient will download [employer] and do Connected MOM visit in 2 weeks.

## 2020-05-21 ENCOUNTER — PROCEDURE VISIT (OUTPATIENT)
Dept: MATERNAL FETAL MEDICINE | Facility: CLINIC | Age: 30
End: 2020-05-21
Attending: OBSTETRICS & GYNECOLOGY
Payer: COMMERCIAL

## 2020-05-21 DIAGNOSIS — Z36.89 ENCOUNTER FOR FETAL ANATOMIC SURVEY: ICD-10-CM

## 2020-05-21 PROCEDURE — 76805 OB US >/= 14 WKS SNGL FETUS: CPT | Mod: S$GLB,,, | Performed by: OBSTETRICS & GYNECOLOGY

## 2020-05-21 PROCEDURE — 76805 PR US, OB 14+WKS, TRANSABD, SINGLE GESTATION: ICD-10-PCS | Mod: S$GLB,,, | Performed by: OBSTETRICS & GYNECOLOGY

## 2020-05-22 ENCOUNTER — PATIENT MESSAGE (OUTPATIENT)
Dept: OBSTETRICS AND GYNECOLOGY | Facility: CLINIC | Age: 30
End: 2020-05-22

## 2020-06-18 ENCOUNTER — ROUTINE PRENATAL (OUTPATIENT)
Dept: OBSTETRICS AND GYNECOLOGY | Facility: CLINIC | Age: 30
End: 2020-06-18
Attending: OBSTETRICS & GYNECOLOGY
Payer: COMMERCIAL

## 2020-06-18 ENCOUNTER — LAB VISIT (OUTPATIENT)
Dept: LAB | Facility: OTHER | Age: 30
End: 2020-06-18
Attending: OBSTETRICS & GYNECOLOGY
Payer: COMMERCIAL

## 2020-06-18 VITALS
SYSTOLIC BLOOD PRESSURE: 110 MMHG | BODY MASS INDEX: 23.97 KG/M2 | DIASTOLIC BLOOD PRESSURE: 60 MMHG | WEIGHT: 144.06 LBS

## 2020-06-18 DIAGNOSIS — Z34.02 ENCOUNTER FOR SUPERVISION OF NORMAL FIRST PREGNANCY IN SECOND TRIMESTER: ICD-10-CM

## 2020-06-18 DIAGNOSIS — Z34.90 PREGNANCY, UNSPECIFIED GESTATIONAL AGE: ICD-10-CM

## 2020-06-18 DIAGNOSIS — Z34.90 PREGNANCY, UNSPECIFIED GESTATIONAL AGE: Primary | ICD-10-CM

## 2020-06-18 LAB
25(OH)D3+25(OH)D2 SERPL-MCNC: 34 NG/ML (ref 30–96)
FOLATE SERPL-MCNC: 14.3 NG/ML (ref 4–24)
GLUCOSE SERPL-MCNC: 188 MG/DL (ref 70–140)
MAGNESIUM SERPL-MCNC: 1.8 MG/DL (ref 1.6–2.6)

## 2020-06-18 PROCEDURE — 82306 VITAMIN D 25 HYDROXY: CPT

## 2020-06-18 PROCEDURE — 82950 GLUCOSE TEST: CPT

## 2020-06-18 PROCEDURE — 99999 PR PBB SHADOW E&M-EST. PATIENT-LVL II: ICD-10-PCS | Mod: PBBFAC,,, | Performed by: OBSTETRICS & GYNECOLOGY

## 2020-06-18 PROCEDURE — 82746 ASSAY OF FOLIC ACID SERUM: CPT

## 2020-06-18 PROCEDURE — 36415 COLL VENOUS BLD VENIPUNCTURE: CPT

## 2020-06-18 PROCEDURE — 0502F SUBSEQUENT PRENATAL CARE: CPT | Mod: CPTII,S$GLB,, | Performed by: OBSTETRICS & GYNECOLOGY

## 2020-06-18 PROCEDURE — 83921 ORGANIC ACID SINGLE QUANT: CPT

## 2020-06-18 PROCEDURE — 0502F PR SUBSEQUENT PRENATAL CARE: ICD-10-PCS | Mod: CPTII,S$GLB,, | Performed by: OBSTETRICS & GYNECOLOGY

## 2020-06-18 PROCEDURE — 83735 ASSAY OF MAGNESIUM: CPT

## 2020-06-18 PROCEDURE — 99999 PR PBB SHADOW E&M-EST. PATIENT-LVL II: CPT | Mod: PBBFAC,,, | Performed by: OBSTETRICS & GYNECOLOGY

## 2020-06-19 ENCOUNTER — TELEPHONE (OUTPATIENT)
Dept: OBSTETRICS AND GYNECOLOGY | Facility: CLINIC | Age: 30
End: 2020-06-19

## 2020-06-19 DIAGNOSIS — O24.419 GESTATIONAL DIABETES MELLITUS (GDM) IN SECOND TRIMESTER, GESTATIONAL DIABETES METHOD OF CONTROL UNSPECIFIED: ICD-10-CM

## 2020-06-19 NOTE — TELEPHONE ENCOUNTER
Left detailed message and spoke to mother for 18 minutes giving reassurance and future plan of action.  Not emergent will discuss Monday.

## 2020-06-22 PROBLEM — O24.419 GESTATIONAL DIABETES MELLITUS (GDM) IN SECOND TRIMESTER: Status: ACTIVE | Noted: 2020-06-22

## 2020-06-22 NOTE — TELEPHONE ENCOUNTER
Spoke with pt. And I told her Diabetes Management is going to call her to set her up for diabetes screenings and educaiton

## 2020-06-22 NOTE — TELEPHONE ENCOUNTER
Spoke to pt discussed her labs.  Needs blood sugar monitoring and diabetic teaching, please set patient up

## 2020-06-23 ENCOUNTER — CLINICAL SUPPORT (OUTPATIENT)
Dept: DIABETES | Facility: CLINIC | Age: 30
End: 2020-06-23
Attending: OBSTETRICS & GYNECOLOGY
Payer: COMMERCIAL

## 2020-06-23 DIAGNOSIS — O24.419 GESTATIONAL DIABETES MELLITUS (GDM) IN SECOND TRIMESTER, GESTATIONAL DIABETES METHOD OF CONTROL UNSPECIFIED: ICD-10-CM

## 2020-06-23 PROCEDURE — G0108 PR DIAB MANAGE TRN  PER INDIV: ICD-10-PCS | Mod: 95,,, | Performed by: DIETITIAN, REGISTERED

## 2020-06-23 PROCEDURE — G0108 DIAB MANAGE TRN  PER INDIV: HCPCS | Mod: 95,,, | Performed by: DIETITIAN, REGISTERED

## 2020-06-23 NOTE — PROGRESS NOTES
Diabetes Care Specialist Virtual Visit Note   It was in the patient's best interest to receive diabetes self-management education and support services in this format to prevent the exposure to COVID-19.        The patient location is: home   The chief complaint leading to consultation is: Diabetes  Visit type: audiovisual  Total time spent with patient: 60 min   Each patient to whom he or she provides medical services by telemedicine is:  (1) informed of the relationship between the physician and patient and the respective role of any other health care provider with respect to management of the patient; and (2) notified that he or she may decline to receive medical services by telemedicine and may withdraw from such care at any time.    Diabetes Education  Author: Rema Galdamez RD  Date: 6/23/2020    Diabetes Care Management Summary  Diabetes Education Record Assessment/Progress: Initial  Current Diabetes Risk Level: Moderate     Last A1c: No results found for: HGBA1C  Last visit with Diabetes Educator: Last Education Visit: Not Found      Diabetes Type  Diabetes Type : Gestational    Diabetes History  Diabetes Diagnosis: 0-1 year  Current Treatment: Diet    Health Maintenance was reviewed today with patient. Discussed with patient importance of routine eye exams, foot exams/foot care, blood work (i.e.: A1c, microalbumin, and lipid), dental visits, yearly flu vaccine, and pneumonia vaccine as indicated by PCP. Patient verbalized understanding.     Health Maintenance Topics with due status: Not Due       Topic Last Completion Date    Pap Smear 10/03/2019    Influenza Vaccine Not Due     Health Maintenance Due   Topic Date Due    Lipid Panel  1991    TETANUS VACCINE  02/21/2009       Nutrition  Meal Planning: 3 meals per day, water, snacks between meal, eats out seldom(unable to eat meat, beef leads to emesis. Able to eat some chicken. Is a very healthy eater)  What type of beverages do you drink?:  water  Meal Plan 24 Hour Recall - Breakfast: Unable to eat breakfast this am due to nausea, may eat a banana or fresh fruit in the morning or boiled eggs when she is not sick  Meal Plan 24 Hour Recall - Lunch: is able to eat lunch the last 2 weeks, eats salads with beans  Meal Plan 24 Hour Recall - Dinner: quinioa with chicken and veg  Meal Plan 24 Hour Recall - Snack: apple with peanut butter    Monitoring   Self Monitoring : Not SMBG at this time  Blood Glucose Logs: No  Do you use a personal continuous glucose monitor?: No  In the last month, how often have you had a low blood sugar reaction?: never  Can you tell when your blood sugar is too high?: no    Exercise   Exercise Type: ()  Intensity: Moderate  Frequency: 3-5 Times per week(5 times per week)  Duration: 45 min(45mins - 1 hr)    Current Diabetes Treatment   Current Treatment: Diet    Social History  Preferred Learning Method: Web Based  Primary Support: Spouse  Educational Level: College Graduate  Occupation: stay at home mom  Smoking Status: Never a Smoker  Alcohol Use: Never                                Barriers to Change  Barriers to Change: None  Learning Challenges : None    Readiness to Learn   Readiness to Learn : Eager    Cultural Influences  Cultural Influences: No    Diabetes Education Assessment/Progress  Diabetes Disease Process (diabetes disease process and treatment options): Discussion, Needs Instruction, Instructed, Individual Session, Written Materials Provided(Ed on IR and insulin needs during pregnancy)  Nutrition (Incorporating nutritional management into one's lifestyle): Discussion, Demonstration, Needs Instruction, Individual Session, Instructed, Written Materials Provided(Ed on carb counting, label reading and meal and snack planning.)  Physical Activity (incorporating physical activity into one's lifestyle): Discussion, Comprehends Key Points, Written Materials Provided, Individual Session, Instructed(Works out  with a  5 days per week. Ed on benfits of exercise. Stressed do not exercise on an empty stomach)  Medications (states correct name, dose, onset, peak, duration, side effects & timing of meds): Discussion, Individual Session, Instructed, Needs Review(Reviewed medication options)  Monitoring (monitoring blood glucose/other parameters & using results): Demonstration, Discussion, Instructed, Video, Individual Session, Needs Instruction, Written Materials Provided(Demonstrated meter and BG testing technique. Ed on SMBG 4 times daily, target BG ranges, bring logs to all clinic visits and BG pattern recognition)  Acute Complications (preventing, detecting, and treating acute complications): Discussion, Needs Instruction, Individual Session, Instructed, Written Materials Provided(Ed on hypo and hyperglycemia prevention, s/s and treatment)  Chronic Complications (preventing, detecting, and treating chronic complications): Discussion, Needs Instruction, Instructed, Individual Session, Written Materials Provided(Ed on tight BG control to limit/prevent complications during and after pregnancy. Ed on T2DM prevention)  Clinical (diabetes, other pertinent medical history, and relevant comorbidities reviewed during visit): Discussion, Needs Instruction, Instructed, Individual Session, Written Materials Provided(Reviewd GTT with goal range)  Cognitive (knowledge of self-management skills, functional health literacy): Discussion, Needs Review, Instructed, Individual Session, Written Materials Provided  Psychosocial (emotional response to diabetes): Discussion, Comprehends Key Points, Individual Session, Written Materials Provided, Instructed, Needs Review(Was very surprised with diagnosis. She is very eagar to control BG and have a healthy pregnancy and baby)  Diabetes Distress and Support Systems: Discussion, Needs Review, Individual Session, Instructed, Written Materials Provided(Has very supportive  )  Behavioral (readiness for change, lifestyle practices, self-care behaviors): Discussion, Needs Instruction, Individual Session, Instructed, Written Materials Provided    Goals  Patient has selected/evaluated goals during today's session: Yes, selected  Healthy Eating: Set(Eat 30-45 g of carbs at breafast and 45-60g of carbs at lunch and dinner)  Start Date: 06/23/20  Target Date: 07/14/20  Monitoring: Set(SMBG 4 times daily and bring logs to all clinic visits.)  Start Date: 06/23/20  Target Date: 07/07/20              Diabetes Meal Plan  Restrictions: Restricted Carbohydrate  Calories: 2200  Carbohydrate Per Meal: 30-45g, 45-60g  Carbohydrate Per Snack : 15-30g  Fat: 88-97g  Protein: 88-99g    Today's Self-Management Care Plan was developed with the patient's input and is based on barriers identified during today's assessment.    The long and short-term goals in the care plan were written with the patient/caregiver's input. The patient has agreed to work toward these goals to improve her overall diabetes control.      The patient received a copy of today's self-management plan and verbalized understanding of the care plan, goals, and all of today's instructions.      The patient was encouraged to communicate with her physician and care team regarding her condition(s) and treatment.  I provided the patient with my contact information today and encouraged her to contact me via phone or patient portal as needed.     Education Units of Time   Time Spent: 60 min

## 2020-06-24 DIAGNOSIS — O24.419 GESTATIONAL DIABETES MELLITUS (GDM) IN SECOND TRIMESTER, GESTATIONAL DIABETES METHOD OF CONTROL UNSPECIFIED: Primary | ICD-10-CM

## 2020-06-24 LAB — METHYLMALONATE SERPL-SCNC: 0.3 UMOL/L

## 2020-06-24 RX ORDER — DEXTROSE 4 G
1 TABLET,CHEWABLE ORAL 4 TIMES DAILY
Qty: 1 EACH | Refills: 0 | Status: SHIPPED | OUTPATIENT
Start: 2020-06-24 | End: 2020-06-25

## 2020-06-24 RX ORDER — LANCETS
1 EACH MISCELLANEOUS 4 TIMES DAILY
Qty: 200 EACH | Refills: 1 | Status: ON HOLD | OUTPATIENT
Start: 2020-06-24 | End: 2020-10-02 | Stop reason: HOSPADM

## 2020-06-24 NOTE — TELEPHONE ENCOUNTER
L/M stating waiting for  In house to sign RX for her glucometer, to check with her drug store later today.

## 2020-06-29 ENCOUNTER — TELEPHONE (OUTPATIENT)
Dept: OBSTETRICS AND GYNECOLOGY | Facility: CLINIC | Age: 30
End: 2020-06-29

## 2020-06-29 RX ORDER — LANCETS
1 EACH MISCELLANEOUS 4 TIMES DAILY
Qty: 200 EACH | Refills: 3 | Status: SHIPPED | OUTPATIENT
Start: 2020-06-29 | End: 2020-08-06

## 2020-06-29 RX ORDER — INSULIN PUMP SYRINGE, 3 ML
EACH MISCELLANEOUS
Qty: 1 EACH | Refills: 0 | Status: ON HOLD | OUTPATIENT
Start: 2020-06-29 | End: 2020-10-02 | Stop reason: HOSPADM

## 2020-07-09 ENCOUNTER — ROUTINE PRENATAL (OUTPATIENT)
Dept: OBSTETRICS AND GYNECOLOGY | Facility: CLINIC | Age: 30
End: 2020-07-09
Attending: OBSTETRICS & GYNECOLOGY
Payer: COMMERCIAL

## 2020-07-09 VITALS
BODY MASS INDEX: 25.06 KG/M2 | WEIGHT: 150.56 LBS | SYSTOLIC BLOOD PRESSURE: 110 MMHG | DIASTOLIC BLOOD PRESSURE: 60 MMHG

## 2020-07-09 DIAGNOSIS — Z34.02 ENCOUNTER FOR SUPERVISION OF NORMAL FIRST PREGNANCY IN SECOND TRIMESTER: Primary | ICD-10-CM

## 2020-07-09 DIAGNOSIS — O24.419 GESTATIONAL DIABETES MELLITUS (GDM) IN SECOND TRIMESTER, GESTATIONAL DIABETES METHOD OF CONTROL UNSPECIFIED: ICD-10-CM

## 2020-07-09 PROCEDURE — 0502F SUBSEQUENT PRENATAL CARE: CPT | Mod: CPTII,S$GLB,, | Performed by: OBSTETRICS & GYNECOLOGY

## 2020-07-09 PROCEDURE — 0502F PR SUBSEQUENT PRENATAL CARE: ICD-10-PCS | Mod: CPTII,S$GLB,, | Performed by: OBSTETRICS & GYNECOLOGY

## 2020-07-09 PROCEDURE — 99999 PR PBB SHADOW E&M-EST. PATIENT-LVL III: CPT | Mod: PBBFAC,,, | Performed by: OBSTETRICS & GYNECOLOGY

## 2020-07-09 PROCEDURE — 99999 PR PBB SHADOW E&M-EST. PATIENT-LVL III: ICD-10-PCS | Mod: PBBFAC,,, | Performed by: OBSTETRICS & GYNECOLOGY

## 2020-07-09 NOTE — PROGRESS NOTES
fastings 70-92,   2 hr pp 100-122.      Had one time 176 but had hummus and meghan without a protein.     Ultrasound at 32 weeks for gDM.

## 2020-08-06 ENCOUNTER — ROUTINE PRENATAL (OUTPATIENT)
Dept: OBSTETRICS AND GYNECOLOGY | Facility: CLINIC | Age: 30
End: 2020-08-06
Attending: OBSTETRICS & GYNECOLOGY
Payer: COMMERCIAL

## 2020-08-06 VITALS — SYSTOLIC BLOOD PRESSURE: 110 MMHG | BODY MASS INDEX: 26.3 KG/M2 | DIASTOLIC BLOOD PRESSURE: 70 MMHG | WEIGHT: 158.06 LBS

## 2020-08-06 DIAGNOSIS — Z34.03 ENCOUNTER FOR SUPERVISION OF NORMAL FIRST PREGNANCY IN THIRD TRIMESTER: Primary | ICD-10-CM

## 2020-08-06 PROCEDURE — 0502F SUBSEQUENT PRENATAL CARE: CPT | Mod: CPTII,S$GLB,, | Performed by: OBSTETRICS & GYNECOLOGY

## 2020-08-06 PROCEDURE — 99999 PR PBB SHADOW E&M-EST. PATIENT-LVL III: CPT | Mod: PBBFAC,,, | Performed by: OBSTETRICS & GYNECOLOGY

## 2020-08-06 PROCEDURE — 99999 PR PBB SHADOW E&M-EST. PATIENT-LVL III: ICD-10-PCS | Mod: PBBFAC,,, | Performed by: OBSTETRICS & GYNECOLOGY

## 2020-08-06 PROCEDURE — 0502F PR SUBSEQUENT PRENATAL CARE: ICD-10-PCS | Mod: CPTII,S$GLB,, | Performed by: OBSTETRICS & GYNECOLOGY

## 2020-08-06 RX ORDER — CHOLECALCIFEROL (VITAMIN D3) 25 MCG
1000 TABLET ORAL DAILY
COMMUNITY
End: 2022-03-07

## 2020-08-06 RX ORDER — MAGNESIUM 30 MG
TABLET ORAL ONCE
Status: ON HOLD | COMMUNITY
End: 2020-10-02 | Stop reason: HOSPADM

## 2020-08-21 ENCOUNTER — PROCEDURE VISIT (OUTPATIENT)
Dept: RADIOLOGY | Facility: CLINIC | Age: 30
End: 2020-08-21
Attending: OBSTETRICS & GYNECOLOGY
Payer: COMMERCIAL

## 2020-08-21 ENCOUNTER — ROUTINE PRENATAL (OUTPATIENT)
Dept: OBSTETRICS AND GYNECOLOGY | Facility: CLINIC | Age: 30
End: 2020-08-21
Attending: OBSTETRICS & GYNECOLOGY
Payer: COMMERCIAL

## 2020-08-21 VITALS
WEIGHT: 162.56 LBS | BODY MASS INDEX: 27.06 KG/M2 | DIASTOLIC BLOOD PRESSURE: 68 MMHG | SYSTOLIC BLOOD PRESSURE: 112 MMHG

## 2020-08-21 DIAGNOSIS — Z34.03 ENCOUNTER FOR SUPERVISION OF NORMAL FIRST PREGNANCY IN THIRD TRIMESTER: Primary | ICD-10-CM

## 2020-08-21 DIAGNOSIS — Z23 NEED FOR TDAP VACCINATION: Primary | ICD-10-CM

## 2020-08-21 DIAGNOSIS — Z23 NEED FOR TDAP VACCINATION: ICD-10-CM

## 2020-08-21 DIAGNOSIS — O24.419 GESTATIONAL DIABETES MELLITUS (GDM) IN SECOND TRIMESTER, GESTATIONAL DIABETES METHOD OF CONTROL UNSPECIFIED: ICD-10-CM

## 2020-08-21 PROCEDURE — 76816 OB US FOLLOW-UP PER FETUS: CPT | Mod: S$GLB,,, | Performed by: OBSTETRICS & GYNECOLOGY

## 2020-08-21 PROCEDURE — 76816 PR  US,PREGNANT UTERUS,F/U,TRANSABD APP: ICD-10-PCS | Mod: S$GLB,,, | Performed by: OBSTETRICS & GYNECOLOGY

## 2020-08-21 PROCEDURE — 99999 PR PBB SHADOW E&M-EST. PATIENT-LVL I: ICD-10-PCS | Mod: PBBFAC,,,

## 2020-08-21 PROCEDURE — 99999 PR PBB SHADOW E&M-EST. PATIENT-LVL I: CPT | Mod: PBBFAC,,,

## 2020-08-21 PROCEDURE — 90471 TDAP VACCINE GREATER THAN OR EQUAL TO 7YO IM: ICD-10-PCS | Mod: S$GLB,,, | Performed by: OBSTETRICS & GYNECOLOGY

## 2020-08-21 PROCEDURE — 99999 PR PBB SHADOW E&M-EST. PATIENT-LVL III: ICD-10-PCS | Mod: PBBFAC,,, | Performed by: OBSTETRICS & GYNECOLOGY

## 2020-08-21 PROCEDURE — 99999 PR PBB SHADOW E&M-EST. PATIENT-LVL III: CPT | Mod: PBBFAC,,, | Performed by: OBSTETRICS & GYNECOLOGY

## 2020-08-21 PROCEDURE — 0502F SUBSEQUENT PRENATAL CARE: CPT | Mod: CPTII,S$GLB,, | Performed by: OBSTETRICS & GYNECOLOGY

## 2020-08-21 PROCEDURE — 90471 IMMUNIZATION ADMIN: CPT | Mod: S$GLB,,, | Performed by: OBSTETRICS & GYNECOLOGY

## 2020-08-21 PROCEDURE — 90715 TDAP VACCINE 7 YRS/> IM: CPT | Mod: S$GLB,,, | Performed by: OBSTETRICS & GYNECOLOGY

## 2020-08-21 PROCEDURE — 90715 TDAP VACCINE GREATER THAN OR EQUAL TO 7YO IM: ICD-10-PCS | Mod: S$GLB,,, | Performed by: OBSTETRICS & GYNECOLOGY

## 2020-08-21 PROCEDURE — 0502F PR SUBSEQUENT PRENATAL CARE: ICD-10-PCS | Mod: CPTII,S$GLB,, | Performed by: OBSTETRICS & GYNECOLOGY

## 2020-08-21 NOTE — PROGRESS NOTES
Tdap today.      Growth today due to gDM - A1.  EFQ 19% with normal MVP.  Forgot BS log today but has been doing well overall.  Did have a BS spike when had a salad the other day.   Will email her log.      Repeat growth in 4 weeks since 19%.      Patient requesting LTCS as mode of delivery.  Does not want more than 3 children.  Wants no more than 3 children.  Risks benefits reviewed.  Patient is afraid of needing an emergent section.

## 2020-08-21 NOTE — PROGRESS NOTES
Ordering Physician: Dr. Brandt    Order Type: Written    During visit today patient received injection of Tdap to left deltoid. Patient tolerated well, no allergic reaction noted. Requested patient to remain 10 minutes after injection.     Pre-Pain Scale:None     Post Pain Scale:None

## 2020-09-03 ENCOUNTER — TELEPHONE (OUTPATIENT)
Dept: OBSTETRICS AND GYNECOLOGY | Facility: CLINIC | Age: 30
End: 2020-09-03

## 2020-09-03 ENCOUNTER — ROUTINE PRENATAL (OUTPATIENT)
Dept: OBSTETRICS AND GYNECOLOGY | Facility: CLINIC | Age: 30
End: 2020-09-03
Attending: OBSTETRICS & GYNECOLOGY
Payer: COMMERCIAL

## 2020-09-03 VITALS
DIASTOLIC BLOOD PRESSURE: 60 MMHG | WEIGHT: 166.75 LBS | SYSTOLIC BLOOD PRESSURE: 122 MMHG | BODY MASS INDEX: 27.75 KG/M2

## 2020-09-03 DIAGNOSIS — Z34.03 ENCOUNTER FOR SUPERVISION OF NORMAL FIRST PREGNANCY IN THIRD TRIMESTER: Primary | ICD-10-CM

## 2020-09-03 DIAGNOSIS — O24.419 GESTATIONAL DIABETES MELLITUS (GDM) IN SECOND TRIMESTER, GESTATIONAL DIABETES METHOD OF CONTROL UNSPECIFIED: Primary | ICD-10-CM

## 2020-09-03 DIAGNOSIS — O24.419 GESTATIONAL DIABETES MELLITUS (GDM) IN THIRD TRIMESTER, GESTATIONAL DIABETES METHOD OF CONTROL UNSPECIFIED: ICD-10-CM

## 2020-09-03 PROCEDURE — 0502F SUBSEQUENT PRENATAL CARE: CPT | Mod: CPTII,S$GLB,, | Performed by: OBSTETRICS & GYNECOLOGY

## 2020-09-03 PROCEDURE — 99999 PR PBB SHADOW E&M-EST. PATIENT-LVL III: ICD-10-PCS | Mod: PBBFAC,,, | Performed by: OBSTETRICS & GYNECOLOGY

## 2020-09-03 PROCEDURE — 99999 PR PBB SHADOW E&M-EST. PATIENT-LVL III: CPT | Mod: PBBFAC,,, | Performed by: OBSTETRICS & GYNECOLOGY

## 2020-09-03 PROCEDURE — 0502F PR SUBSEQUENT PRENATAL CARE: ICD-10-PCS | Mod: CPTII,S$GLB,, | Performed by: OBSTETRICS & GYNECOLOGY

## 2020-09-03 NOTE — PROGRESS NOTES
Blood sugars are great, fastings no higher than 89 and 2 hr pp 122 and 120.  Wants elective LTCS.

## 2020-09-10 ENCOUNTER — ROUTINE PRENATAL (OUTPATIENT)
Dept: OBSTETRICS AND GYNECOLOGY | Facility: CLINIC | Age: 30
End: 2020-09-10
Attending: OBSTETRICS & GYNECOLOGY
Payer: COMMERCIAL

## 2020-09-10 ENCOUNTER — LAB VISIT (OUTPATIENT)
Dept: LAB | Facility: OTHER | Age: 30
End: 2020-09-10
Attending: OBSTETRICS & GYNECOLOGY
Payer: COMMERCIAL

## 2020-09-10 VITALS
DIASTOLIC BLOOD PRESSURE: 60 MMHG | SYSTOLIC BLOOD PRESSURE: 118 MMHG | WEIGHT: 168.63 LBS | BODY MASS INDEX: 28.07 KG/M2

## 2020-09-10 DIAGNOSIS — O24.419 GESTATIONAL DIABETES MELLITUS (GDM) IN THIRD TRIMESTER, GESTATIONAL DIABETES METHOD OF CONTROL UNSPECIFIED: ICD-10-CM

## 2020-09-10 DIAGNOSIS — Z34.90 PREGNANCY, UNSPECIFIED GESTATIONAL AGE: ICD-10-CM

## 2020-09-10 DIAGNOSIS — Z34.03 ENCOUNTER FOR SUPERVISION OF NORMAL FIRST PREGNANCY IN THIRD TRIMESTER: ICD-10-CM

## 2020-09-10 DIAGNOSIS — Z34.03 ENCOUNTER FOR SUPERVISION OF NORMAL FIRST PREGNANCY IN THIRD TRIMESTER: Primary | ICD-10-CM

## 2020-09-10 LAB
ABO + RH BLD: NORMAL
BASOPHILS # BLD AUTO: 0.03 K/UL (ref 0–0.2)
BASOPHILS NFR BLD: 0.3 % (ref 0–1.9)
BLD GP AB SCN CELLS X3 SERPL QL: NORMAL
DIFFERENTIAL METHOD: ABNORMAL
EOSINOPHIL # BLD AUTO: 0.1 K/UL (ref 0–0.5)
EOSINOPHIL NFR BLD: 0.6 % (ref 0–8)
ERYTHROCYTE [DISTWIDTH] IN BLOOD BY AUTOMATED COUNT: 12.3 % (ref 11.5–14.5)
ERYTHROCYTE [DISTWIDTH] IN BLOOD BY AUTOMATED COUNT: 12.3 % (ref 11.5–14.5)
HCT VFR BLD AUTO: 33.9 % (ref 37–48.5)
HCT VFR BLD AUTO: 33.9 % (ref 37–48.5)
HGB BLD-MCNC: 10.9 G/DL (ref 12–16)
HGB BLD-MCNC: 10.9 G/DL (ref 12–16)
HIV1+2 IGG SERPL QL IA.RAPID: NORMAL
IMM GRANULOCYTES # BLD AUTO: 0.19 K/UL (ref 0–0.04)
IMM GRANULOCYTES NFR BLD AUTO: 1.6 % (ref 0–0.5)
LYMPHOCYTES # BLD AUTO: 1.9 K/UL (ref 1–4.8)
LYMPHOCYTES NFR BLD: 16.5 % (ref 18–48)
MCH RBC QN AUTO: 29.9 PG (ref 27–31)
MCH RBC QN AUTO: 29.9 PG (ref 27–31)
MCHC RBC AUTO-ENTMCNC: 32.2 G/DL (ref 32–36)
MCHC RBC AUTO-ENTMCNC: 32.2 G/DL (ref 32–36)
MCV RBC AUTO: 93 FL (ref 82–98)
MCV RBC AUTO: 93 FL (ref 82–98)
MONOCYTES # BLD AUTO: 0.8 K/UL (ref 0.3–1)
MONOCYTES NFR BLD: 6.5 % (ref 4–15)
NEUTROPHILS # BLD AUTO: 8.7 K/UL (ref 1.8–7.7)
NEUTROPHILS NFR BLD: 74.5 % (ref 38–73)
NRBC BLD-RTO: 0 /100 WBC
PLATELET # BLD AUTO: 223 K/UL (ref 150–350)
PLATELET # BLD AUTO: 223 K/UL (ref 150–350)
PMV BLD AUTO: 12.2 FL (ref 9.2–12.9)
PMV BLD AUTO: 12.2 FL (ref 9.2–12.9)
RBC # BLD AUTO: 3.64 M/UL (ref 4–5.4)
RBC # BLD AUTO: 3.64 M/UL (ref 4–5.4)
WBC # BLD AUTO: 11.73 K/UL (ref 3.9–12.7)
WBC # BLD AUTO: 11.73 K/UL (ref 3.9–12.7)

## 2020-09-10 PROCEDURE — 86850 RBC ANTIBODY SCREEN: CPT

## 2020-09-10 PROCEDURE — 86592 SYPHILIS TEST NON-TREP QUAL: CPT

## 2020-09-10 PROCEDURE — 86703 HIV-1/HIV-2 1 RESULT ANTBDY: CPT | Mod: 91

## 2020-09-10 PROCEDURE — 36415 COLL VENOUS BLD VENIPUNCTURE: CPT

## 2020-09-10 PROCEDURE — 0502F PR SUBSEQUENT PRENATAL CARE: ICD-10-PCS | Mod: CPTII,S$GLB,, | Performed by: OBSTETRICS & GYNECOLOGY

## 2020-09-10 PROCEDURE — 87081 CULTURE SCREEN ONLY: CPT

## 2020-09-10 PROCEDURE — 86703 HIV-1/HIV-2 1 RESULT ANTBDY: CPT

## 2020-09-10 PROCEDURE — 85025 COMPLETE CBC W/AUTO DIFF WBC: CPT

## 2020-09-10 PROCEDURE — 99999 PR PBB SHADOW E&M-EST. PATIENT-LVL II: CPT | Mod: PBBFAC,,, | Performed by: OBSTETRICS & GYNECOLOGY

## 2020-09-10 PROCEDURE — 99999 PR PBB SHADOW E&M-EST. PATIENT-LVL II: ICD-10-PCS | Mod: PBBFAC,,, | Performed by: OBSTETRICS & GYNECOLOGY

## 2020-09-10 PROCEDURE — 0502F SUBSEQUENT PRENATAL CARE: CPT | Mod: CPTII,S$GLB,, | Performed by: OBSTETRICS & GYNECOLOGY

## 2020-09-11 LAB — HIV 1+2 AB+HIV1 P24 AG SERPL QL IA: NEGATIVE

## 2020-09-12 LAB — RPR SER QL: NORMAL

## 2020-09-13 ENCOUNTER — PATIENT MESSAGE (OUTPATIENT)
Dept: OBSTETRICS AND GYNECOLOGY | Facility: CLINIC | Age: 30
End: 2020-09-13

## 2020-09-14 NOTE — TELEPHONE ENCOUNTER
Pt. Called trying to decide to evacuate, but decided to stay and is staying in [place] closer to hospital.

## 2020-09-15 LAB — BACTERIA SPEC AEROBE CULT: NORMAL

## 2020-09-18 ENCOUNTER — ROUTINE PRENATAL (OUTPATIENT)
Dept: OBSTETRICS AND GYNECOLOGY | Facility: CLINIC | Age: 30
End: 2020-09-18
Attending: OBSTETRICS & GYNECOLOGY
Payer: COMMERCIAL

## 2020-09-18 VITALS
BODY MASS INDEX: 28.62 KG/M2 | SYSTOLIC BLOOD PRESSURE: 110 MMHG | DIASTOLIC BLOOD PRESSURE: 70 MMHG | WEIGHT: 171.94 LBS

## 2020-09-18 DIAGNOSIS — Z36.4 ANTENATAL SCREENING FOR FETAL GROWTH RETARDATION USING ULTRASONICS: ICD-10-CM

## 2020-09-18 DIAGNOSIS — Z3A.37 37 WEEKS GESTATION OF PREGNANCY: ICD-10-CM

## 2020-09-18 DIAGNOSIS — Z34.03 ENCOUNTER FOR SUPERVISION OF NORMAL FIRST PREGNANCY IN THIRD TRIMESTER: Primary | ICD-10-CM

## 2020-09-18 DIAGNOSIS — O24.419 GESTATIONAL DIABETES MELLITUS (GDM) IN SECOND TRIMESTER, GESTATIONAL DIABETES METHOD OF CONTROL UNSPECIFIED: ICD-10-CM

## 2020-09-18 DIAGNOSIS — O24.410 DIET CONTROLLED GESTATIONAL DIABETES MELLITUS (GDM) IN THIRD TRIMESTER: ICD-10-CM

## 2020-09-18 PROCEDURE — 76816 PR  US,PREGNANT UTERUS,F/U,TRANSABD APP: ICD-10-PCS | Mod: S$GLB,,, | Performed by: OBSTETRICS & GYNECOLOGY

## 2020-09-18 PROCEDURE — 0502F PR SUBSEQUENT PRENATAL CARE: ICD-10-PCS | Mod: CPTII,S$GLB,, | Performed by: OBSTETRICS & GYNECOLOGY

## 2020-09-18 PROCEDURE — 0502F SUBSEQUENT PRENATAL CARE: CPT | Mod: CPTII,S$GLB,, | Performed by: OBSTETRICS & GYNECOLOGY

## 2020-09-18 PROCEDURE — 99999 PR PBB SHADOW E&M-EST. PATIENT-LVL III: CPT | Mod: PBBFAC,,, | Performed by: OBSTETRICS & GYNECOLOGY

## 2020-09-18 PROCEDURE — 76816 OB US FOLLOW-UP PER FETUS: CPT | Mod: S$GLB,,, | Performed by: OBSTETRICS & GYNECOLOGY

## 2020-09-18 PROCEDURE — 99999 PR PBB SHADOW E&M-EST. PATIENT-LVL III: ICD-10-PCS | Mod: PBBFAC,,, | Performed by: OBSTETRICS & GYNECOLOGY

## 2020-09-21 ENCOUNTER — PATIENT MESSAGE (OUTPATIENT)
Dept: OBSTETRICS AND GYNECOLOGY | Facility: CLINIC | Age: 30
End: 2020-09-21

## 2020-09-23 ENCOUNTER — PATIENT MESSAGE (OUTPATIENT)
Dept: OBSTETRICS AND GYNECOLOGY | Facility: CLINIC | Age: 30
End: 2020-09-23

## 2020-09-24 ENCOUNTER — ROUTINE PRENATAL (OUTPATIENT)
Dept: OBSTETRICS AND GYNECOLOGY | Facility: CLINIC | Age: 30
End: 2020-09-24
Attending: OBSTETRICS & GYNECOLOGY
Payer: COMMERCIAL

## 2020-09-24 VITALS — BODY MASS INDEX: 28.8 KG/M2 | DIASTOLIC BLOOD PRESSURE: 70 MMHG | SYSTOLIC BLOOD PRESSURE: 120 MMHG | WEIGHT: 173.06 LBS

## 2020-09-24 DIAGNOSIS — Z34.03 ENCOUNTER FOR SUPERVISION OF NORMAL FIRST PREGNANCY IN THIRD TRIMESTER: Primary | ICD-10-CM

## 2020-09-24 PROCEDURE — 99999 PR PBB SHADOW E&M-EST. PATIENT-LVL III: ICD-10-PCS | Mod: PBBFAC,,, | Performed by: OBSTETRICS & GYNECOLOGY

## 2020-09-24 PROCEDURE — 0502F SUBSEQUENT PRENATAL CARE: CPT | Mod: CPTII,S$GLB,, | Performed by: OBSTETRICS & GYNECOLOGY

## 2020-09-24 PROCEDURE — 99999 PR PBB SHADOW E&M-EST. PATIENT-LVL III: CPT | Mod: PBBFAC,,, | Performed by: OBSTETRICS & GYNECOLOGY

## 2020-09-24 PROCEDURE — 0502F PR SUBSEQUENT PRENATAL CARE: ICD-10-PCS | Mod: CPTII,S$GLB,, | Performed by: OBSTETRICS & GYNECOLOGY

## 2020-09-24 NOTE — PROGRESS NOTES
Declines SVE today.  Understands the risks benefit alternatives and desires to proceed with elective csection.

## 2020-09-25 RX ORDER — SODIUM CHLORIDE, SODIUM LACTATE, POTASSIUM CHLORIDE, CALCIUM CHLORIDE 600; 310; 30; 20 MG/100ML; MG/100ML; MG/100ML; MG/100ML
INJECTION, SOLUTION INTRAVENOUS CONTINUOUS
Status: CANCELLED | OUTPATIENT
Start: 2020-09-25

## 2020-09-25 NOTE — H&P
[facility]  History & Physical  Obstetrics      SUBJECTIVE:     Chief Complaint/Reason for Admission: Csection    History of Present Illness:  Jane Muller is a 30 y.o.  female with an Estimated Date of Delivery: 10/7/20 admitted for .  Her current obstetrical history is complicated by A1 gDM and h/o weight loss surgery.  Patient desires elective LTCS as mode of delivery.      Ultrasound on  confirmed vertex.    No medications prior to admission.       Review of patient's allergies indicates:  No Known Allergies     Past Medical History:   Diagnosis Date    Anxiety      Past Surgical History:   Procedure Laterality Date    STOMACH SURGERY  2017    Dr Owens - sleeve gastrectomy - was 190lbs     Family History   Problem Relation Age of Onset    Heart attack Father     Hypertension Father     No Known Problems Brother     Breast cancer Neg Hx     Colon cancer Neg Hx     Ovarian cancer Neg Hx      Social History     Tobacco Use    Smoking status: Former Smoker    Smokeless tobacco: Never Used   Substance Use Topics    Alcohol use: Yes     Comment: social    Drug use: No       Review of Systems  Constitutional: no fever or chills  Eyes: no visual changes  Respiratory: no cough or shortness of breath  Cardiovascular: no chest pain or palpitations  Gastrointestinal: no nausea or vomiting, tolerating diet  Genitourinary: no hematuria or dysuria     OBJECTIVE:     Vital Signs (Most Recent):       Physical Exam:  General:  alert and normal appearing gravid female   Skin:  Skin color, texture, turgor normal. No rashes or lesions   HEENT:  conjunctivae/corneas clear. PERRL.   Lungs:  clear to auscultation bilaterally   Heart:  regular rate and rhythm, S1, S2 normal, no murmur, click, rub or gallop   Breasts:     Abdomen:  soft, non-tender; bowel sounds normal   Uterine Size:     Presentations:  cephalic   FHT:  Reactive   Pelvis:    Cervix:     Dilation:      Effacement:     Station:      Consistency:     Position:      Laboratory:  Lab Results   Component Value Date    GROUPTRH A POS 09/10/2020    HEPBSAG Negative 2020        Diagnostic Results:      ASSESSMENT/PLAN:     39+ gestation.     Conditions: a1gDM     Risks, benefits, alternatives and possible complications have been discussed in detail with the patient.  Pre-admission, admission, and post admission procedures and expectations were discussed in detail.  All questions answered, all appropriate consents will be signed at the Hospital. Admit

## 2020-09-27 ENCOUNTER — PATIENT MESSAGE (OUTPATIENT)
Dept: OBSTETRICS AND GYNECOLOGY | Facility: OTHER | Age: 30
End: 2020-09-27

## 2020-09-27 ENCOUNTER — ANESTHESIA EVENT (OUTPATIENT)
Dept: OBSTETRICS AND GYNECOLOGY | Facility: OTHER | Age: 30
End: 2020-09-27
Payer: COMMERCIAL

## 2020-09-29 ENCOUNTER — HOSPITAL ENCOUNTER (OUTPATIENT)
Dept: PREADMISSION TESTING | Facility: OTHER | Age: 30
Discharge: HOME | End: 2020-09-29
Attending: OBSTETRICS & GYNECOLOGY
Payer: COMMERCIAL

## 2020-09-29 DIAGNOSIS — O24.419 GESTATIONAL DIABETES MELLITUS (GDM) IN THIRD TRIMESTER, GESTATIONAL DIABETES METHOD OF CONTROL UNSPECIFIED: ICD-10-CM

## 2020-09-29 PROCEDURE — U0003 INFECTIOUS AGENT DETECTION BY NUCLEIC ACID (DNA OR RNA); SEVERE ACUTE RESPIRATORY SYNDROME CORONAVIRUS 2 (SARS-COV-2) (CORONAVIRUS DISEASE [COVID-19]), AMPLIFIED PROBE TECHNIQUE, MAKING USE OF HIGH THROUGHPUT TECHNOLOGIES AS DESCRIBED BY CMS-2020-01-R: HCPCS

## 2020-09-30 LAB — SARS-COV-2 RNA RESP QL NAA+PROBE: NOT DETECTED

## 2020-10-01 ENCOUNTER — HOSPITAL ENCOUNTER (INPATIENT)
Facility: OTHER | Age: 30
LOS: 2 days | Discharge: HOME | End: 2020-10-03
Attending: OBSTETRICS & GYNECOLOGY | Admitting: OBSTETRICS & GYNECOLOGY
Payer: COMMERCIAL

## 2020-10-01 ENCOUNTER — ANESTHESIA (OUTPATIENT)
Dept: OBSTETRICS AND GYNECOLOGY | Facility: OTHER | Age: 30
End: 2020-10-01
Payer: COMMERCIAL

## 2020-10-01 DIAGNOSIS — Z34.90 PREGNANCY: ICD-10-CM

## 2020-10-01 DIAGNOSIS — O24.419 GESTATIONAL DIABETES MELLITUS (GDM) IN SECOND TRIMESTER, GESTATIONAL DIABETES METHOD OF CONTROL UNSPECIFIED: Primary | ICD-10-CM

## 2020-10-01 LAB
ABO + RH BLD: NORMAL
BASOPHILS # BLD AUTO: 0.04 K/UL (ref 0–0.2)
BASOPHILS # BLD AUTO: 0.04 K/UL (ref 0–0.2)
BASOPHILS NFR BLD: 0.2 % (ref 0–1.9)
BASOPHILS NFR BLD: 0.3 % (ref 0–1.9)
BLD GP AB SCN CELLS X3 SERPL QL: NORMAL
DIFFERENTIAL METHOD: ABNORMAL
DIFFERENTIAL METHOD: ABNORMAL
EOSINOPHIL # BLD AUTO: 0 K/UL (ref 0–0.5)
EOSINOPHIL # BLD AUTO: 0.1 K/UL (ref 0–0.5)
EOSINOPHIL NFR BLD: 0 % (ref 0–8)
EOSINOPHIL NFR BLD: 1 % (ref 0–8)
ERYTHROCYTE [DISTWIDTH] IN BLOOD BY AUTOMATED COUNT: 12.6 % (ref 11.5–14.5)
ERYTHROCYTE [DISTWIDTH] IN BLOOD BY AUTOMATED COUNT: 12.7 % (ref 11.5–14.5)
HCT VFR BLD AUTO: 29.2 % (ref 37–48.5)
HCT VFR BLD AUTO: 34.1 % (ref 37–48.5)
HGB BLD-MCNC: 10.8 G/DL (ref 12–16)
HGB BLD-MCNC: 9.2 G/DL (ref 12–16)
IMM GRANULOCYTES # BLD AUTO: 0.14 K/UL (ref 0–0.04)
IMM GRANULOCYTES # BLD AUTO: 0.15 K/UL (ref 0–0.04)
IMM GRANULOCYTES NFR BLD AUTO: 0.7 % (ref 0–0.5)
IMM GRANULOCYTES NFR BLD AUTO: 1.3 % (ref 0–0.5)
LYMPHOCYTES # BLD AUTO: 2.3 K/UL (ref 1–4.8)
LYMPHOCYTES # BLD AUTO: 3.1 K/UL (ref 1–4.8)
LYMPHOCYTES NFR BLD: 11.3 % (ref 18–48)
LYMPHOCYTES NFR BLD: 26.2 % (ref 18–48)
MCH RBC QN AUTO: 28.5 PG (ref 27–31)
MCH RBC QN AUTO: 29.1 PG (ref 27–31)
MCHC RBC AUTO-ENTMCNC: 31.5 G/DL (ref 32–36)
MCHC RBC AUTO-ENTMCNC: 31.7 G/DL (ref 32–36)
MCV RBC AUTO: 90 FL (ref 82–98)
MCV RBC AUTO: 92 FL (ref 82–98)
MONOCYTES # BLD AUTO: 1 K/UL (ref 0.3–1)
MONOCYTES # BLD AUTO: 2.1 K/UL (ref 0.3–1)
MONOCYTES NFR BLD: 10.4 % (ref 4–15)
MONOCYTES NFR BLD: 8.8 % (ref 4–15)
NEUTROPHILS # BLD AUTO: 15.4 K/UL (ref 1.8–7.7)
NEUTROPHILS # BLD AUTO: 7.4 K/UL (ref 1.8–7.7)
NEUTROPHILS NFR BLD: 62.4 % (ref 38–73)
NEUTROPHILS NFR BLD: 77.4 % (ref 38–73)
NRBC BLD-RTO: 0 /100 WBC
NRBC BLD-RTO: 0 /100 WBC
PLATELET # BLD AUTO: 187 K/UL (ref 150–350)
PLATELET # BLD AUTO: 214 K/UL (ref 150–350)
PLATELET BLD QL SMEAR: ABNORMAL
PMV BLD AUTO: 12.2 FL (ref 9.2–12.9)
PMV BLD AUTO: 12.9 FL (ref 9.2–12.9)
POCT GLUCOSE: 89 MG/DL (ref 70–110)
RBC # BLD AUTO: 3.16 M/UL (ref 4–5.4)
RBC # BLD AUTO: 3.79 M/UL (ref 4–5.4)
WBC # BLD AUTO: 11.81 K/UL (ref 3.9–12.7)
WBC # BLD AUTO: 19.88 K/UL (ref 3.9–12.7)

## 2020-10-01 PROCEDURE — 71000033 HC RECOVERY, INTIAL HOUR: Performed by: OBSTETRICS & GYNECOLOGY

## 2020-10-01 PROCEDURE — S0028 INJECTION, FAMOTIDINE, 20 MG: HCPCS | Performed by: OBSTETRICS & GYNECOLOGY

## 2020-10-01 PROCEDURE — 11000001 HC ACUTE MED/SURG PRIVATE ROOM

## 2020-10-01 PROCEDURE — 59510 PR FULL ROUT OBSTE CARE,CESAREAN DELIV: ICD-10-PCS | Mod: GB,,, | Performed by: OBSTETRICS & GYNECOLOGY

## 2020-10-01 PROCEDURE — 36415 COLL VENOUS BLD VENIPUNCTURE: CPT

## 2020-10-01 PROCEDURE — 63600175 PHARM REV CODE 636 W HCPCS: Performed by: STUDENT IN AN ORGANIZED HEALTH CARE EDUCATION/TRAINING PROGRAM

## 2020-10-01 PROCEDURE — 37000009 HC ANESTHESIA EA ADD 15 MINS: Performed by: OBSTETRICS & GYNECOLOGY

## 2020-10-01 PROCEDURE — 37000008 HC ANESTHESIA 1ST 15 MINUTES: Performed by: OBSTETRICS & GYNECOLOGY

## 2020-10-01 PROCEDURE — 59514 CESAREAN DELIVERY ONLY: CPT | Mod: ,,, | Performed by: ANESTHESIOLOGY

## 2020-10-01 PROCEDURE — 85025 COMPLETE CBC W/AUTO DIFF WBC: CPT | Mod: 91

## 2020-10-01 PROCEDURE — 25000003 PHARM REV CODE 250: Performed by: STUDENT IN AN ORGANIZED HEALTH CARE EDUCATION/TRAINING PROGRAM

## 2020-10-01 PROCEDURE — 59510 CESAREAN DELIVERY: CPT | Mod: GB,,, | Performed by: OBSTETRICS & GYNECOLOGY

## 2020-10-01 PROCEDURE — 63600175 PHARM REV CODE 636 W HCPCS: Performed by: OBSTETRICS & GYNECOLOGY

## 2020-10-01 PROCEDURE — 25000003 PHARM REV CODE 250: Performed by: OBSTETRICS & GYNECOLOGY

## 2020-10-01 PROCEDURE — 59514 PRA REAN DELIVERY ONLY: ICD-10-PCS | Mod: ,,, | Performed by: ANESTHESIOLOGY

## 2020-10-01 PROCEDURE — 86850 RBC ANTIBODY SCREEN: CPT

## 2020-10-01 PROCEDURE — 71000039 HC RECOVERY, EACH ADD'L HOUR: Performed by: OBSTETRICS & GYNECOLOGY

## 2020-10-01 PROCEDURE — 36004725 HC OB OR TIME LEV III - EA ADD 15 MIN: Performed by: OBSTETRICS & GYNECOLOGY

## 2020-10-01 PROCEDURE — 36004724 HC OB OR TIME LEV III - 1ST 15 MIN: Performed by: OBSTETRICS & GYNECOLOGY

## 2020-10-01 RX ORDER — OXYTOCIN/RINGER'S LACTATE 30/500 ML
334 PLASTIC BAG, INJECTION (ML) INTRAVENOUS ONCE
Status: DISCONTINUED | OUTPATIENT
Start: 2020-10-01 | End: 2020-10-03 | Stop reason: HOSPADM

## 2020-10-01 RX ORDER — SIMETHICONE 80 MG
1 TABLET,CHEWABLE ORAL EVERY 6 HOURS PRN
Status: DISCONTINUED | OUTPATIENT
Start: 2020-10-01 | End: 2020-10-03 | Stop reason: HOSPADM

## 2020-10-01 RX ORDER — OXYTOCIN 10 [USP'U]/ML
INJECTION, SOLUTION INTRAMUSCULAR; INTRAVENOUS
Status: DISCONTINUED | OUTPATIENT
Start: 2020-10-01 | End: 2020-10-01

## 2020-10-01 RX ORDER — METHYLERGONOVINE MALEATE 0.2 MG/ML
200 INJECTION INTRAVENOUS ONCE AS NEEDED
Status: DISCONTINUED | OUTPATIENT
Start: 2020-10-01 | End: 2020-10-03 | Stop reason: HOSPADM

## 2020-10-01 RX ORDER — ONDANSETRON 8 MG/1
8 TABLET, ORALLY DISINTEGRATING ORAL EVERY 8 HOURS PRN
Status: DISCONTINUED | OUTPATIENT
Start: 2020-10-01 | End: 2020-10-03 | Stop reason: HOSPADM

## 2020-10-01 RX ORDER — HYDROGEN PEROXIDE 3 %
20 SOLUTION, NON-ORAL MISCELLANEOUS
COMMUNITY
End: 2022-12-27 | Stop reason: SDUPTHER

## 2020-10-01 RX ORDER — FAMOTIDINE 10 MG/ML
20 INJECTION INTRAVENOUS
Status: COMPLETED | OUTPATIENT
Start: 2020-10-01 | End: 2020-10-01

## 2020-10-01 RX ORDER — SODIUM CHLORIDE, SODIUM LACTATE, POTASSIUM CHLORIDE, CALCIUM CHLORIDE 600; 310; 30; 20 MG/100ML; MG/100ML; MG/100ML; MG/100ML
INJECTION, SOLUTION INTRAVENOUS CONTINUOUS PRN
Status: DISCONTINUED | OUTPATIENT
Start: 2020-10-01 | End: 2020-10-01

## 2020-10-01 RX ORDER — MISOPROSTOL 200 UG/1
800 TABLET ORAL ONCE AS NEEDED
Status: DISCONTINUED | OUTPATIENT
Start: 2020-10-01 | End: 2020-10-03 | Stop reason: HOSPADM

## 2020-10-01 RX ORDER — KETOROLAC TROMETHAMINE 30 MG/ML
30 INJECTION, SOLUTION INTRAMUSCULAR; INTRAVENOUS
Status: COMPLETED | OUTPATIENT
Start: 2020-10-01 | End: 2020-10-02

## 2020-10-01 RX ORDER — BUPIVACAINE HYDROCHLORIDE 7.5 MG/ML
INJECTION, SOLUTION INTRASPINAL
Status: DISCONTINUED | OUTPATIENT
Start: 2020-10-01 | End: 2020-10-01

## 2020-10-01 RX ORDER — AMOXICILLIN 250 MG
1 CAPSULE ORAL NIGHTLY PRN
Status: DISCONTINUED | OUTPATIENT
Start: 2020-10-01 | End: 2020-10-03 | Stop reason: HOSPADM

## 2020-10-01 RX ORDER — IBUPROFEN 400 MG/1
800 TABLET ORAL
Status: DISCONTINUED | OUTPATIENT
Start: 2020-10-02 | End: 2020-10-03 | Stop reason: HOSPADM

## 2020-10-01 RX ORDER — DIPHENHYDRAMINE HYDROCHLORIDE 50 MG/ML
12.5 INJECTION INTRAMUSCULAR; INTRAVENOUS
Status: DISCONTINUED | OUTPATIENT
Start: 2020-10-01 | End: 2020-10-03 | Stop reason: HOSPADM

## 2020-10-01 RX ORDER — ACETAMINOPHEN 325 MG/1
650 TABLET ORAL
Status: DISCONTINUED | OUTPATIENT
Start: 2020-10-01 | End: 2020-10-03 | Stop reason: HOSPADM

## 2020-10-01 RX ORDER — NALBUPHINE HYDROCHLORIDE 10 MG/ML
5 INJECTION, SOLUTION INTRAMUSCULAR; INTRAVENOUS; SUBCUTANEOUS ONCE AS NEEDED
Status: DISCONTINUED | OUTPATIENT
Start: 2020-10-01 | End: 2020-10-03 | Stop reason: HOSPADM

## 2020-10-01 RX ORDER — HYDROCORTISONE 25 MG/G
CREAM TOPICAL 3 TIMES DAILY PRN
Status: DISCONTINUED | OUTPATIENT
Start: 2020-10-01 | End: 2020-10-03 | Stop reason: HOSPADM

## 2020-10-01 RX ORDER — ONDANSETRON 2 MG/ML
INJECTION INTRAMUSCULAR; INTRAVENOUS
Status: DISCONTINUED | OUTPATIENT
Start: 2020-10-01 | End: 2020-10-01

## 2020-10-01 RX ORDER — PRENATAL WITH FERROUS FUM AND FOLIC ACID 3080; 920; 120; 400; 22; 1.84; 3; 20; 10; 1; 12; 200; 27; 25; 2 [IU]/1; [IU]/1; MG/1; [IU]/1; MG/1; MG/1; MG/1; MG/1; MG/1; MG/1; UG/1; MG/1; MG/1; MG/1; MG/1
1 TABLET ORAL DAILY
Status: DISCONTINUED | OUTPATIENT
Start: 2020-10-01 | End: 2020-10-03 | Stop reason: HOSPADM

## 2020-10-01 RX ORDER — FENTANYL CITRATE 50 UG/ML
INJECTION, SOLUTION INTRAMUSCULAR; INTRAVENOUS
Status: DISCONTINUED | OUTPATIENT
Start: 2020-10-01 | End: 2020-10-01

## 2020-10-01 RX ORDER — OXYTOCIN/RINGER'S LACTATE 30/500 ML
95 PLASTIC BAG, INJECTION (ML) INTRAVENOUS ONCE
Status: COMPLETED | OUTPATIENT
Start: 2020-10-01 | End: 2020-10-01

## 2020-10-01 RX ORDER — DEXAMETHASONE SODIUM PHOSPHATE 4 MG/ML
INJECTION, SOLUTION INTRA-ARTICULAR; INTRALESIONAL; INTRAMUSCULAR; INTRAVENOUS; SOFT TISSUE
Status: DISCONTINUED | OUTPATIENT
Start: 2020-10-01 | End: 2020-10-01

## 2020-10-01 RX ORDER — SODIUM CITRATE AND CITRIC ACID MONOHYDRATE 334; 500 MG/5ML; MG/5ML
30 SOLUTION ORAL
Status: COMPLETED | OUTPATIENT
Start: 2020-10-01 | End: 2020-10-01

## 2020-10-01 RX ORDER — OXYCODONE HYDROCHLORIDE 5 MG/1
5 TABLET ORAL EVERY 4 HOURS PRN
Status: DISCONTINUED | OUTPATIENT
Start: 2020-10-01 | End: 2020-10-03 | Stop reason: HOSPADM

## 2020-10-01 RX ORDER — MORPHINE SULFATE 0.5 MG/ML
INJECTION, SOLUTION EPIDURAL; INTRATHECAL; INTRAVENOUS
Status: DISCONTINUED | OUTPATIENT
Start: 2020-10-01 | End: 2020-10-01

## 2020-10-01 RX ORDER — ACETAMINOPHEN 500 MG
1000 TABLET ORAL
Status: COMPLETED | OUTPATIENT
Start: 2020-10-01 | End: 2020-10-01

## 2020-10-01 RX ORDER — SODIUM CHLORIDE, SODIUM LACTATE, POTASSIUM CHLORIDE, CALCIUM CHLORIDE 600; 310; 30; 20 MG/100ML; MG/100ML; MG/100ML; MG/100ML
INJECTION, SOLUTION INTRAVENOUS CONTINUOUS
Status: DISCONTINUED | OUTPATIENT
Start: 2020-10-01 | End: 2020-10-03 | Stop reason: HOSPADM

## 2020-10-01 RX ORDER — CARBOPROST TROMETHAMINE 250 UG/ML
250 INJECTION, SOLUTION INTRAMUSCULAR
Status: DISCONTINUED | OUTPATIENT
Start: 2020-10-01 | End: 2020-10-03 | Stop reason: HOSPADM

## 2020-10-01 RX ORDER — DOCUSATE SODIUM 100 MG/1
200 CAPSULE, LIQUID FILLED ORAL 2 TIMES DAILY
Status: DISCONTINUED | OUTPATIENT
Start: 2020-10-01 | End: 2020-10-03 | Stop reason: HOSPADM

## 2020-10-01 RX ORDER — DIPHENHYDRAMINE HCL 25 MG
25 CAPSULE ORAL EVERY 6 HOURS PRN
Status: DISCONTINUED | OUTPATIENT
Start: 2020-10-01 | End: 2020-10-03 | Stop reason: HOSPADM

## 2020-10-01 RX ORDER — ONDANSETRON 2 MG/ML
4 INJECTION INTRAMUSCULAR; INTRAVENOUS EVERY 6 HOURS PRN
Status: DISCONTINUED | OUTPATIENT
Start: 2020-10-01 | End: 2020-10-03 | Stop reason: HOSPADM

## 2020-10-01 RX ORDER — OXYCODONE HYDROCHLORIDE 5 MG/1
10 TABLET ORAL EVERY 4 HOURS PRN
Status: DISCONTINUED | OUTPATIENT
Start: 2020-10-01 | End: 2020-10-03 | Stop reason: HOSPADM

## 2020-10-01 RX ADMIN — ACETAMINOPHEN 650 MG: 325 TABLET, FILM COATED ORAL at 02:10

## 2020-10-01 RX ADMIN — DEXTROSE 2 G: 50 INJECTION, SOLUTION INTRAVENOUS at 06:10

## 2020-10-01 RX ADMIN — KETOROLAC TROMETHAMINE 30 MG: 30 INJECTION, SOLUTION INTRAMUSCULAR at 09:10

## 2020-10-01 RX ADMIN — OXYTOCIN 4 UNITS: 10 INJECTION, SOLUTION INTRAMUSCULAR; INTRAVENOUS at 07:10

## 2020-10-01 RX ADMIN — PHENYLEPHRINE HYDROCHLORIDE 50 MCG/MIN: 10 INJECTION INTRAVENOUS at 06:10

## 2020-10-01 RX ADMIN — ACETAMINOPHEN 650 MG: 325 TABLET, FILM COATED ORAL at 09:10

## 2020-10-01 RX ADMIN — SODIUM CITRATE AND CITRIC ACID MONOHYDRATE 30 ML: 500; 334 SOLUTION ORAL at 06:10

## 2020-10-01 RX ADMIN — OXYTOCIN 2 UNITS: 10 INJECTION, SOLUTION INTRAMUSCULAR; INTRAVENOUS at 07:10

## 2020-10-01 RX ADMIN — ONDANSETRON HYDROCHLORIDE 4 MG: 2 INJECTION INTRAMUSCULAR; INTRAVENOUS at 06:10

## 2020-10-01 RX ADMIN — SODIUM CHLORIDE, SODIUM LACTATE, POTASSIUM CHLORIDE, AND CALCIUM CHLORIDE: 600; 310; 30; 20 INJECTION, SOLUTION INTRAVENOUS at 06:10

## 2020-10-01 RX ADMIN — KETOROLAC TROMETHAMINE 30 MG: 30 INJECTION, SOLUTION INTRAMUSCULAR at 02:10

## 2020-10-01 RX ADMIN — OXYCODONE 5 MG: 5 TABLET ORAL at 10:10

## 2020-10-01 RX ADMIN — DEXAMETHASONE SODIUM PHOSPHATE 4 MG: 4 INJECTION, SOLUTION INTRAMUSCULAR; INTRAVENOUS at 06:10

## 2020-10-01 RX ADMIN — SODIUM CHLORIDE, SODIUM LACTATE, POTASSIUM CHLORIDE, AND CALCIUM CHLORIDE: 600; 310; 30; 20 INJECTION, SOLUTION INTRAVENOUS at 07:10

## 2020-10-01 RX ADMIN — FAMOTIDINE 20 MG: 10 INJECTION INTRAVENOUS at 06:10

## 2020-10-01 RX ADMIN — FENTANYL CITRATE 10 MCG: 50 INJECTION, SOLUTION INTRAMUSCULAR; INTRAVENOUS at 06:10

## 2020-10-01 RX ADMIN — KETOROLAC TROMETHAMINE 30 MG: 30 INJECTION, SOLUTION INTRAMUSCULAR at 08:10

## 2020-10-01 RX ADMIN — DOCUSATE SODIUM 200 MG: 100 CAPSULE, LIQUID FILLED ORAL at 09:10

## 2020-10-01 RX ADMIN — SODIUM CHLORIDE, SODIUM LACTATE, POTASSIUM CHLORIDE, AND CALCIUM CHLORIDE: .6; .31; .03; .02 INJECTION, SOLUTION INTRAVENOUS at 05:10

## 2020-10-01 RX ADMIN — BUPIVACAINE HYDROCHLORIDE IN DEXTROSE 1.6 ML: 7.5 INJECTION, SOLUTION SUBARACHNOID at 06:10

## 2020-10-01 RX ADMIN — Medication 95 MILLI-UNITS/MIN: at 08:10

## 2020-10-01 RX ADMIN — Medication 0.1 MG: at 06:10

## 2020-10-01 RX ADMIN — ACETAMINOPHEN 1000 MG: 500 TABLET, FILM COATED ORAL at 06:10

## 2020-10-01 NOTE — OP NOTE
OPERATIVE NOTE      SECTION    DATE OF PROCEDURE: 10/01/2020    PREOPERATIVE DIAGNOSES:   1. Pregnancy at 39 weeks 1d  2. Elective csection upon maternal request  3. A1gDM    POSTOPERATIVE DIAGNOSES:   1. Pregnancy at 39 weeks 1d  2. Elective csection upon maternal request  3. A1gDM       SURGEON : Erwin Brandt MD    ASSISTANT: Rosy Liu PGY-1    PROCEDURE: Primary low transverse  section via Pfannenstiel incision.     ANESTHESIA: Spinal/Epidura    ESTIMATED BLOOD LOSS: 600cc      INDICATIONS: Jane Muller is a 30 y.o.   female with IUP 39w1d weeks' gestational age presents for elective csection per maternal request.     FINDINGS: male infant in vtx  presentation. Infant weighing 7lbs 3oz.. Normal uterus, tubes and ovaries bilateral.     PROCEDURE IN DETAIL: The patient was taken back to the Operating Room where anesthesia was found to be adequate.   A sheriff was placed to gravity. The patient was prepped and draped in normal sterile fashion in dorsal supine position with leftward tilt.  A Pfannenstiel skin incision was made with scalpel and wax was taken down to the underlying fascia. Incision was made in the midline of the fascia with inside knife; the incision was extended laterally using curved Stokes scissors on both sides. Using Kocher clamps, the superior aspect of the fascia was grasped and tented up and the underlying rectus muscle was  off the fascia bluntly with the assistance of curved Stokes scissors. In a similar fashion, the inferior aspect of the fascia was grasped with Kocher's, tented up and the underlying rectus muscle was  off bluntly with the assistance of curved Stokes scissors. The muscle was then  in midline.. Peritoneum was identified, tented up with hemostats and entered sharply with Metzenbaum scissors and then extended superiorly and inferiorly bluntly. Bladder blade was inserted and the lower uterine segment identified. The  vesicouterine peritoneum was then identified tented up and dissected off the lower uterine segment and the bladder flap was created digitally and sharply. The bladder blade was then reinserted and a low transverse incision was made on the uterus with a scalpel. Amniotomy showed clear fluid. The infant was found to be in a vertex presentation. The head was then delivered atraumatically and the infant shortly followed. The cord was clamped and cut and the infant was suctioned with bulb and handed off to the awaiting nurses. Cord blood were collected and sent.   The placenta was then delivered via manual extraction. The uterus was then cleared of all debris and clots. The hysterotomy was then closed using #1 chromic in a running locked fashion. A second layer was used to imbricate and create excellent hemostasis. The posterior aspect of the uterus was irrigated and cleared of clots. The  gutters were irrigated and cleared of all clots.  Re visualization of the hysterotomy closure showed excellent hemostasis. The peritoneum was then reapproximated with 2-0 vicryl incorporating the muscles into the closure. The fascia was then reapproximated using a running stitch of 1 vicryl. Subcutaneous tissue was re approximated with three interrupted stitches of plain gut. The skin was then closed using 4-0 Monocryl in a running fashion.     Sponge, lap, and needle counts were correct x 2. The patient tolerated the procedure well and was transferred over to the recovery area in stable condition.

## 2020-10-01 NOTE — TRANSFER OF CARE
"Anesthesia Transfer of Care Note    Patient: Jane Muller    Procedure(s) Performed: Procedure(s) (LRB):   SECTION (N/A)    Patient location: PACU    Anesthesia Type: spinal    Transport from OR: Transported from OR on room air with adequate spontaneous ventilation    Post pain: adequate analgesia    Post assessment: no apparent anesthetic complications    Post vital signs: stable    Level of consciousness: awake, alert and oriented    Nausea/Vomiting: no nausea/vomiting    Complications: none    Transfer of care protocol was followed      Last vitals:   Visit Vitals  /87   Pulse 82   Temp 36.7 °C (98.1 °F) (Temporal)   Resp 18   Ht 5' 5" (1.651 m)   Wt 78.5 kg (173 lb 1 oz)   LMP 2019   SpO2 99%   Breastfeeding No   BMI 28.80 kg/m²     "

## 2020-10-01 NOTE — ANESTHESIA PREPROCEDURE EVALUATION
[facility]  Anesthesia Pre-Operative Evaluation         Patient Name: Jane Muller  YOB: 1990  MRN: [id number]    10/01/2020      Jane Muller is a 30 y.o. female  @ 39w1d who presents for . Hx gDM and prior weight loss surgery. Pt complains of GERD. Last NPO since 8pm last night. Denies being on blood thinners, problems with bleeding or spinal pathology.      OB History    Para Term  AB Living   1 0 0 0 0 0   SAB TAB Ectopic Multiple Live Births   0 0 0 0 0      # Outcome Date GA Lbr Maxwell/2nd Weight Sex Delivery Anes PTL Lv   1 Current                Review of patient's allergies indicates:  No Known Allergies    Wt Readings from Last 1 Encounters:   10/01/20 0519 78.5 kg (173 lb 1 oz)       BP Readings from Last 3 Encounters:   20 120/70   20 110/70   09/10/20 118/60       Patient Active Problem List   Diagnosis    Fibrocystic breast    History of weight loss surgery    Gestational diabetes mellitus (GDM) in second trimester    Pregnancy       Past Surgical History:   Procedure Laterality Date    STOMACH SURGERY      Dr Owens - sleeve gastrectomy - was 190lbs       Social History     Socioeconomic History    Marital status:      Spouse name: Not on file    Number of children: Not on file    Years of education: Not on file    Highest education level: Not on file   Occupational History    Not on file   Social Needs    Financial resource strain: Not on file    Food insecurity     Worry: Not on file     Inability: Not on file    Transportation needs     Medical: Not on file     Non-medical: Not on file   Tobacco Use    Smoking status: Former Smoker    Smokeless tobacco: Never Used   Substance and Sexual Activity    Alcohol use: Yes     Comment: social    Drug use: No    Sexual activity: Yes     Partners: Male     Birth control/protection: None   Lifestyle    Physical activity     Days per week:  Not on file     Minutes per session: Not on file    Stress: Not on file   Relationships    Social connections     Talks on phone: Not on file     Gets together: Not on file     Attends Zoroastrianism service: Not on file     Active member of club or organization: Not on file     Attends meetings of clubs or organizations: Not on file     Relationship status: Not on file   Other Topics Concern    Not on file   Social History Narrative    Not on file         Chemistry        Component Value Date/Time     03/09/2020 1136    K 3.8 03/09/2020 1136     03/09/2020 1136    CO2 23 03/09/2020 1136    BUN 10 03/09/2020 1136    CREATININE 0.7 03/09/2020 1136    GLU 81 03/09/2020 1136    GLU 81 03/09/2020 1136        Component Value Date/Time    CALCIUM 9.9 03/09/2020 1136    ALKPHOS 56 03/09/2020 1136    AST 19 03/09/2020 1136    ALT 15 03/09/2020 1136    BILITOT 0.4 03/09/2020 1136    ESTGFRAFRICA >60 03/09/2020 1136    EGFRNONAA >60 03/09/2020 1136            Lab Results   Component Value Date    WBC 11.81 10/01/2020    HGB 10.8 (L) 10/01/2020    HCT 34.1 (L) 10/01/2020    MCV 90 10/01/2020     10/01/2020       No results for input(s): PT, INR, PROTIME, APTT in the last 72 hours.                                                                                  Anesthesia Evaluation    I have reviewed the Patient Summary Reports.    I have reviewed the Nursing Notes.    I have reviewed the Medications.     Review of Systems  Anesthesia Hx:  No problems with previous Anesthesia Denies Hx of Anesthetic complications  History of prior surgery of interest to airway management or planning: Denies Family Hx of Anesthesia complications.   Denies Personal Hx of Anesthesia complications.   Social:  No Alcohol Use, Non-Smoker    Hematology/Oncology:  Hematology Normal        Cardiovascular:  Cardiovascular Normal  ECG has been reviewed.    Pulmonary:  Pulmonary Normal    Renal/:  Renal/ Normal      Musculoskeletal:  Musculoskeletal Normal    Neurological:  Neurology Normal        Physical Exam  General:  Well nourished    Airway/Jaw/Neck:  Airway Findings: Mouth Opening: Normal Tongue: Normal  General Airway Assessment: Adult  Mallampati: II  TM Distance: Normal, at least 6 cm  Jaw/Neck Findings:  Neck ROM: Normal ROM     Eyes/Ears/Nose:  EYES/EARS/NOSE FINDINGS: Normal   Dental:  Dental Findings: In tact   Chest/Lungs:  Chest/Lungs Findings: Clear to auscultation     Heart/Vascular:  Heart Findings: Rate: Normal  Rhythm: Regular Rhythm  Sounds: Normal     Abdomen:  Abdomen Findings:  Normal     Musculoskeletal:  Musculoskeletal Findings:    Skin:  Skin Findings:     Mental Status:  Mental Status Findings:  Cooperative, Alert and Oriented         Anesthesia Plan  Type of Anesthesia, risks & benefits discussed:  Anesthesia Type:  epidural, general, CSE, spinal  Patient's Preference:   Intra-op Monitoring Plan: standard ASA monitors  Intra-op Monitoring Plan Comments:   Post Op Pain Control Plan: multimodal analgesia  Post Op Pain Control Plan Comments:   Induction:   rapid sequence  Beta Blocker:  Patient is not currently on a Beta-Blocker (No further documentation required).       Informed Consent: Patient understands risks and agrees with Anesthesia plan.  Questions answered. Anesthesia consent signed with patient.  ASA Score: 2     Day of Surgery Review of History & Physical:    H&P update referred to the provider.         Ready For Surgery From Anesthesia Perspective.

## 2020-10-01 NOTE — ANESTHESIA PROCEDURE NOTES
Spinal    Diagnosis: iup  Patient location during procedure: OR  Start time: 10/1/2020 6:37 AM  Timeout: 10/1/2020 6:37 AM  End time: 10/1/2020 6:41 AM    Staffing  Authorizing Provider: Roseanne Riojas MD  Performing Provider: Adriano Barbosa MD    Staffing  Other anesthesia staff: Raman Lyons MD  Preanesthetic Checklist  Completed: patient identified, site marked, surgical consent, pre-op evaluation, timeout performed, IV checked, risks and benefits discussed and monitors and equipment checked  Spinal Block  Patient position: sitting  Prep: ChloraPrep  Patient monitoring: cardiac monitor, heart rate, continuous pulse ox and frequent blood pressure checks  Approach: midline  Location: L3-4  Injection technique: single shot  CSF Fluid: clear free-flowing CSF  Needle  Needle type: pencil-tip   Needle gauge: 25 G  Needle length: 5 in  Additional Documentation: no paresthesia on injection, negative aspiration for heme and incremental injection  Needle localization: anatomical landmarks  Assessment  Sensory level: T6   Dermatomal levels determined by pinch or prick  Ease of block: easy  Patient's tolerance of the procedure: comfortable throughout block

## 2020-10-02 PROBLEM — Z34.90 PREGNANCY: Status: RESOLVED | Noted: 2020-10-01 | Resolved: 2020-10-02

## 2020-10-02 PROCEDURE — 99024 POSTOP FOLLOW-UP VISIT: CPT | Mod: ,,, | Performed by: OBSTETRICS & GYNECOLOGY

## 2020-10-02 PROCEDURE — 11000001 HC ACUTE MED/SURG PRIVATE ROOM

## 2020-10-02 PROCEDURE — 99024 PR POST-OP FOLLOW-UP VISIT: ICD-10-PCS | Mod: ,,, | Performed by: OBSTETRICS & GYNECOLOGY

## 2020-10-02 PROCEDURE — 25000003 PHARM REV CODE 250: Performed by: OBSTETRICS & GYNECOLOGY

## 2020-10-02 PROCEDURE — 63600175 PHARM REV CODE 636 W HCPCS: Performed by: OBSTETRICS & GYNECOLOGY

## 2020-10-02 RX ORDER — IBUPROFEN 800 MG/1
800 TABLET ORAL EVERY 8 HOURS
Qty: 20 TABLET | Refills: 0 | Status: SHIPPED | OUTPATIENT
Start: 2020-10-02 | End: 2020-10-15

## 2020-10-02 RX ORDER — DOCUSATE SODIUM 100 MG/1
200 CAPSULE, LIQUID FILLED ORAL 2 TIMES DAILY
Qty: 30 CAPSULE | Refills: 0 | Status: SHIPPED | OUTPATIENT
Start: 2020-10-02 | End: 2020-10-15

## 2020-10-02 RX ORDER — OXYCODONE AND ACETAMINOPHEN 5; 325 MG/1; MG/1
1 TABLET ORAL EVERY 4 HOURS PRN
Qty: 20 TABLET | Refills: 0 | Status: SHIPPED | OUTPATIENT
Start: 2020-10-02 | End: 2020-10-15

## 2020-10-02 RX ADMIN — ACETAMINOPHEN 650 MG: 325 TABLET, FILM COATED ORAL at 11:10

## 2020-10-02 RX ADMIN — OXYCODONE 10 MG: 5 TABLET ORAL at 05:10

## 2020-10-02 RX ADMIN — IBUPROFEN 800 MG: 400 TABLET, FILM COATED ORAL at 09:10

## 2020-10-02 RX ADMIN — KETOROLAC TROMETHAMINE 30 MG: 30 INJECTION, SOLUTION INTRAMUSCULAR at 04:10

## 2020-10-02 RX ADMIN — OXYCODONE 5 MG: 5 TABLET ORAL at 04:10

## 2020-10-02 RX ADMIN — OXYCODONE 5 MG: 5 TABLET ORAL at 01:10

## 2020-10-02 RX ADMIN — DOCUSATE SODIUM 200 MG: 100 CAPSULE, LIQUID FILLED ORAL at 09:10

## 2020-10-02 RX ADMIN — OXYCODONE 5 MG: 5 TABLET ORAL at 09:10

## 2020-10-02 RX ADMIN — PRENATAL VIT W/ FE FUMARATE-FA TAB 27-0.8 MG 1 TABLET: 27-0.8 TAB at 09:10

## 2020-10-02 RX ADMIN — ACETAMINOPHEN 650 MG: 325 TABLET, FILM COATED ORAL at 04:10

## 2020-10-02 RX ADMIN — OXYCODONE 5 MG: 5 TABLET ORAL at 10:10

## 2020-10-02 NOTE — LACTATION NOTE
10/01/20 1900   Maternal Assessment   Breast Density soft   Areola elastic   Nipples graspable;everted   Maternal Infant Feeding   Maternal Emotional State tense   Infant Positioning clutch/football   Latch Assistance yes  (baby sleepy and not ready to nurse. Will try in an hour.)   Mother aware to call for asst as needed.WIll see mother again in the am. She is aware to call for help tonight as needed.

## 2020-10-02 NOTE — LACTATION NOTE
10/02/20 1330   Maternal Assessment   Breast Shape Right:;round   Breast Density Right:;soft   Areola Right:;elastic   Nipples Right:;everted   Maternal Infant Feeding   Maternal Emotional State assist needed   Infant Positioning clutch/football   Signs of Milk Transfer audible swallow   Latch Assistance yes   LC assisted with diaper change. Placed baby skin to skin to calm.  Pt agreeable to receiving assistance with latching baby to right side. Baby stimulated to open wide. Tugs and pulls observed with audible swallows noted. LC encouraged Pt to utilize breast compressions and stimulation to assist baby with nutritive nursing. All questions answered. LC placed contact information on board and encouraged Pt to call for further assistance.

## 2020-10-02 NOTE — ANESTHESIA POSTPROCEDURE EVALUATION
Anesthesia Post Evaluation    Patient: Jane Muller    Procedure(s) Performed: Procedure(s) (LRB):   SECTION (N/A)    Final Anesthesia Type: spinal    Patient location during evaluation: floor  Patient participation: Yes- Able to Participate  Level of consciousness: awake and alert  Post-procedure vital signs: reviewed and stable  Pain management: adequate  Airway patency: patent  FRANKIE mitigation strategies: Use of major conduction anesthesia (spinal/epidural) or peripheral nerve block and Multimodal analgesia  PONV status at discharge: No PONV  Anesthetic complications: no      Cardiovascular status: blood pressure returned to baseline  Respiratory status: unassisted, spontaneous ventilation and room air  Hydration status: euvolemic  Follow-up not needed.          Vitals Value Taken Time   /72 10/02/20 1155   Temp 36.7 °C (98 °F) 10/02/20 1155   Pulse 86 10/02/20 1155   Resp 18 10/02/20 1331   SpO2 98 % 10/02/20 1155         Event Time   Out of Recovery 09:58:00         Pain/Wei Score: Pain Rating Prior to Med Admin: 7 (10/2/2020  1:31 PM)  Pain Rating Post Med Admin: 5 (10/2/2020 10:00 AM)

## 2020-10-02 NOTE — HPI
29 yo  presents at 39 weeks for primary elective . Pregnancy is significant for GDM diet controlled and history of gastric sleeve.

## 2020-10-02 NOTE — PROGRESS NOTES
[facility]  Obstetrics  Postpartum Progress Note    Patient Name: Jane Muller  MRN: [id number]  Admission Date: 10/1/2020  Hospital Length of Stay: 1 days  Attending Physician: Erwin Brandt MD  Primary Care Provider: Primary Doctor No    Subjective:     Principal Problem: delivery delivered    Hospital Course:  Patient underwent  delivery without complication.  POD 1: Patient is without complaint, reports difficulty latching on her right side.     Interval History:     She is doing well this morning. She is tolerating a regular diet without nausea or vomiting. She is voiding spontaneously. She is ambulating. She has passed flatus, and has not a BM. Vaginal bleeding is mild. She denies fever or chills. Abdominal pain is moderate and controlled with oral medications. She is breastfeeding. She desires circumcision for her male baby: yes. R/B/A of circumcision discussed, consent signed after questions answered.    Objective:     Vital Signs (Most Recent):  Temp: 97.9 °F (36.6 °C) (10/02/20 08)  Pulse: 72 (10/02/20 0820)  Resp: 18 (10/02/20 0900)  BP: (!) 106/58 (10/02/20 0820)  SpO2: 97 % (10/02/20 0820) Vital Signs (24h Range):  Temp:  [97.4 °F (36.3 °C)-98.1 °F (36.7 °C)] 97.9 °F (36.6 °C)  Pulse:  [66-90] 72  Resp:  [15-18] 18  SpO2:  [96 %-97 %] 97 %  BP: ()/(51-62) 106/58     Weight: 78.5 kg (173 lb 1 oz)  Body mass index is 28.8 kg/m².      Intake/Output Summary (Last 24 hours) at 10/2/2020 0954  Last data filed at 10/2/2020 0400  Gross per 24 hour   Intake --   Output 850 ml   Net -850 ml           Significant Labs:  Lab Results   Component Value Date    GROUPTRH A POS 10/01/2020    HEPBSAG Negative 2020    STREPBCULT No Group B Streptococcus isolated 09/10/2020     Recent Labs   Lab 10/01/20  1906   HGB 9.2*   HCT 29.2*       I have personallly reviewed all pertinent lab results from the last 24 hours.    Physical Exam:   Constitutional: She is  oriented to person, place, and time. She appears well-developed and well-nourished.       Cardiovascular: Normal rate.     Pulmonary/Chest: Effort normal.        Abdominal: Soft. She exhibits abdominal incision (Clean, dry and intact). Tenderness: Mild incisional tenderness, no fundal tenderness.     Genitourinary:    Uterus normal.             Musculoskeletal: Edema (1+ edema) present. No tenderness.       Neurological: She is alert and oriented to person, place, and time.     Psychiatric: She has a normal mood and affect.       Assessment/Plan:     30 y.o. female  for:    Gestational diabetes mellitus (GDM) in second trimester  Plan 6 week GTT    History of weight loss surgery  Encourage iron supplementation, discourage extended use of NSAIDs.        Disposition: As patient meets milestones, will plan to discharge when stable.    Patrizia Pereira MD  Obstetrics  [facility]

## 2020-10-02 NOTE — HOSPITAL COURSE
Patient underwent  delivery without complication.  POD 1: Patient is without complaint, reports difficulty latching on her right side.   POD 2: Feeling well, denies complaints

## 2020-10-02 NOTE — SUBJECTIVE & OBJECTIVE
Interval History:     She is doing well this morning. She is tolerating a regular diet without nausea or vomiting. She is voiding spontaneously. She is ambulating. She has passed flatus, and has not a BM. Vaginal bleeding is mild. She denies fever or chills. Abdominal pain is moderate and controlled with oral medications. She is breastfeeding. She desires circumcision for her male baby: yes. R/B/A of circumcision discussed, consent signed after questions answered.    Objective:     Vital Signs (Most Recent):  Temp: 97.9 °F (36.6 °C) (10/02/20 0820)  Pulse: 72 (10/02/20 0820)  Resp: 18 (10/02/20 0900)  BP: (!) 106/58 (10/02/20 0820)  SpO2: 97 % (10/02/20 0820) Vital Signs (24h Range):  Temp:  [97.4 °F (36.3 °C)-98.1 °F (36.7 °C)] 97.9 °F (36.6 °C)  Pulse:  [66-90] 72  Resp:  [15-18] 18  SpO2:  [96 %-97 %] 97 %  BP: ()/(51-62) 106/58     Weight: 78.5 kg (173 lb 1 oz)  Body mass index is 28.8 kg/m².      Intake/Output Summary (Last 24 hours) at 10/2/2020 0954  Last data filed at 10/2/2020 0400  Gross per 24 hour   Intake --   Output 850 ml   Net -850 ml           Significant Labs:  Lab Results   Component Value Date    GROUPTRH A POS 10/01/2020    HEPBSAG Negative 03/09/2020    STREPBCULT No Group B Streptococcus isolated 09/10/2020     Recent Labs   Lab 10/01/20  1906   HGB 9.2*   HCT 29.2*       I have personallly reviewed all pertinent lab results from the last 24 hours.    Physical Exam:   Constitutional: She is oriented to person, place, and time. She appears well-developed and well-nourished.       Cardiovascular: Normal rate.     Pulmonary/Chest: Effort normal.        Abdominal: Soft. She exhibits abdominal incision (Clean, dry and intact). Tenderness: Mild incisional tenderness, no fundal tenderness.     Genitourinary:    Uterus normal.             Musculoskeletal: Edema (1+ edema) present. No tenderness.       Neurological: She is alert and oriented to person, place, and time.     Psychiatric: She has a  normal mood and affect.

## 2020-10-03 VITALS
WEIGHT: 173.06 LBS | BODY MASS INDEX: 28.83 KG/M2 | HEART RATE: 84 BPM | TEMPERATURE: 99 F | DIASTOLIC BLOOD PRESSURE: 61 MMHG | SYSTOLIC BLOOD PRESSURE: 110 MMHG | HEIGHT: 65 IN | OXYGEN SATURATION: 97 % | RESPIRATION RATE: 17 BRPM

## 2020-10-03 PROCEDURE — 99024 POSTOP FOLLOW-UP VISIT: CPT | Mod: ,,, | Performed by: OBSTETRICS & GYNECOLOGY

## 2020-10-03 PROCEDURE — 99024 PR POST-OP FOLLOW-UP VISIT: ICD-10-PCS | Mod: ,,, | Performed by: OBSTETRICS & GYNECOLOGY

## 2020-10-03 PROCEDURE — 25000003 PHARM REV CODE 250: Performed by: OBSTETRICS & GYNECOLOGY

## 2020-10-03 RX ADMIN — OXYCODONE 10 MG: 5 TABLET ORAL at 06:10

## 2020-10-03 RX ADMIN — ACETAMINOPHEN 650 MG: 325 TABLET, FILM COATED ORAL at 12:10

## 2020-10-03 RX ADMIN — SIMETHICONE 80 MG: 80 TABLET, CHEWABLE ORAL at 09:10

## 2020-10-03 RX ADMIN — ACETAMINOPHEN 650 MG: 325 TABLET, FILM COATED ORAL at 09:10

## 2020-10-03 RX ADMIN — PRENATAL VIT W/ FE FUMARATE-FA TAB 27-0.8 MG 1 TABLET: 27-0.8 TAB at 09:10

## 2020-10-03 RX ADMIN — DOCUSATE SODIUM 200 MG: 100 CAPSULE, LIQUID FILLED ORAL at 09:10

## 2020-10-03 RX ADMIN — IBUPROFEN 800 MG: 400 TABLET, FILM COATED ORAL at 09:10

## 2020-10-03 NOTE — SUBJECTIVE & OBJECTIVE
Interval History:     She is doing well this morning. She is tolerating a regular diet without nausea or vomiting. She is voiding spontaneously. She is ambulating. She has passed flatus, and has not a BM. Vaginal bleeding is mild. She denies fever or chills. Abdominal pain is mild and controlled with oral medications.     Objective:     Vital Signs (Most Recent):  Temp: 98.3 °F (36.8 °C) (10/03/20 0754)  Pulse: 79 (10/03/20 0754)  Resp: 17 (10/03/20 0754)  BP: 118/74 (10/03/20 0754)  SpO2: 98 % (10/03/20 0754) Vital Signs (24h Range):  Temp:  [97.7 °F (36.5 °C)-98.5 °F (36.9 °C)] 98.3 °F (36.8 °C)  Pulse:  [72-96] 79  Resp:  [16-18] 17  SpO2:  [95 %-99 %] 98 %  BP: (111-120)/(61-74) 118/74     Weight: 78.5 kg (173 lb 1 oz)  Body mass index is 28.8 kg/m².      Intake/Output Summary (Last 24 hours) at 10/3/2020 0844  Last data filed at 10/2/2020 1000  Gross per 24 hour   Intake --   Output 500 ml   Net -500 ml           Significant Labs:  Lab Results   Component Value Date    GROUPTRH A POS 10/01/2020    HEPBSAG Negative 03/09/2020    STREPBCULT No Group B Streptococcus isolated 09/10/2020     Recent Labs   Lab 10/01/20  1906   HGB 9.2*   HCT 29.2*       I have personallly reviewed all pertinent lab results from the last 24 hours.    Physical Exam:   Constitutional: She is oriented to person, place, and time. She appears well-developed and well-nourished. No distress.    HENT:   Head: Normocephalic and atraumatic.      Cardiovascular: Normal rate, regular rhythm, normal heart sounds and intact distal pulses.     Pulmonary/Chest: Effort normal. No respiratory distress.        Abdominal: Soft. Bowel sounds are normal. Abdominal incision: c/d/i. There is no guarding.   Firm fundus below umbilicus     Genitourinary:    Uterus normal.   negative for vaginal discharge          Musculoskeletal: Normal range of motion and moves all extremeties.       Neurological: She is alert and oriented to person, place, and time.    Skin:  Skin is warm. She is not diaphoretic.    Psychiatric: She has a normal mood and affect. Her behavior is normal. Judgment and thought content normal.

## 2020-10-03 NOTE — LACTATION NOTE
Breastfeeding discharge instruction guide reviewed with pt and significant other. Pt states baby is  Breastfeeding without difficulty. She is latching the baby independently with good pulls an tugs while the baby nurses. No c/o  nipple pain during feeding. Adequate  intake and output reviewed with mom. Mom encouraged to breastfeed 8 or more times in 24/hrs on cue until content. Pt given warmline too call if breastfeeding assistance is needed after discharge.

## 2020-10-03 NOTE — PLAN OF CARE
Problem: Diabetes in Pregnancy  Goal: Blood Glucose Level Within Desired Range  Outcome: Ongoing, Progressing     Problem:  Fall Injury Risk  Goal: Absence of Fall, Infant Drop and Related Injury  Outcome: Ongoing, Progressing     Problem: Adult Inpatient Plan of Care  Goal: Plan of Care Review  Outcome: Ongoing, Progressing  Goal: Patient-Specific Goal (Individualization)  Outcome: Ongoing, Progressing  Goal: Absence of Hospital-Acquired Illness or Injury  Outcome: Ongoing, Progressing  Goal: Optimal Comfort and Wellbeing  Outcome: Ongoing, Progressing  Goal: Readiness for Transition of Care  Outcome: Ongoing, Progressing  Goal: Rounds/Family Conference  Outcome: Ongoing, Progressing     Problem: Adjustment to Role Transition (Postpartum  Delivery)  Goal: Successful Maternal Role Transition  Outcome: Ongoing, Progressing     Problem: Bleeding (Postpartum  Delivery)  Goal: Hemostasis  Outcome: Ongoing, Progressing     Problem: Infection (Postpartum  Delivery)  Goal: Absence of Infection Signs/Symptoms  Outcome: Ongoing, Progressing     Problem: Pain (Postpartum  Delivery)  Goal: Acceptable Pain Control  Outcome: Ongoing, Progressing     Problem: Postoperative Nausea and Vomiting (Postpartum  Delivery)  Goal: Nausea and Vomiting Relief  Outcome: Ongoing, Progressing     Problem: Postoperative Urinary Retention (Postpartum  Delivery)  Goal: Effective Urinary Elimination  Outcome: Ongoing, Progressing     Problem: Breastfeeding  Goal: Effective Breastfeeding  Outcome: Ongoing, Progressing     Problem: Skin Injury Risk Increased  Goal: Skin Health and Integrity  Outcome: Ongoing, Progressing     Problem: Infection  Goal: Infection Symptom Resolution  Outcome: Ongoing, Progressing     Plan to discharge today.

## 2020-10-03 NOTE — PLAN OF CARE
Problem: Diabetes in Pregnancy  Goal: Blood Glucose Level Within Desired Range  10/3/2020 1225 by Steven Deal RN  Outcome: Met  10/3/2020 1011 by Steven Deal RN  Outcome: Ongoing, Progressing     Problem:  Fall Injury Risk  Goal: Absence of Fall, Infant Drop and Related Injury  10/3/2020 1225 by Steven Deal RN  Outcome: Met  10/3/2020 1011 by Steven Deal RN  Outcome: Ongoing, Progressing     Problem: Adult Inpatient Plan of Care  Goal: Plan of Care Review  10/3/2020 1225 by Steven Deal RN  Outcome: Met  10/3/2020 1011 by Steven Deal RN  Outcome: Ongoing, Progressing  Goal: Patient-Specific Goal (Individualization)  10/3/2020 1225 by Steven Deal RN  Outcome: Met  10/3/2020 1011 by Steven Deal RN  Outcome: Ongoing, Progressing  Goal: Absence of Hospital-Acquired Illness or Injury  10/3/2020 1225 by Steven Deal RN  Outcome: Met  10/3/2020 1011 by Steven Deal RN  Outcome: Ongoing, Progressing  Goal: Optimal Comfort and Wellbeing  10/3/2020 1225 by Steven Deal RN  Outcome: Met  10/3/2020 1011 by Steven Deal RN  Outcome: Ongoing, Progressing  Goal: Readiness for Transition of Care  10/3/2020 1225 by Steven Deal RN  Outcome: Met  10/3/2020 1011 by Steven Deal RN  Outcome: Ongoing, Progressing  Goal: Rounds/Family Conference  10/3/2020 1225 by Steven Deal RN  Outcome: Met  10/3/2020 1011 by Steven Deal RN  Outcome: Ongoing, Progressing     Problem: Adjustment to Role Transition (Postpartum  Delivery)  Goal: Successful Maternal Role Transition  10/3/2020 1225 by Steven Deal RN  Outcome: Met  10/3/2020 1011 by Steven Deal RN  Outcome: Ongoing, Progressing     Problem: Bleeding (Postpartum  Delivery)  Goal: Hemostasis  10/3/2020 1225 by Steven Deal RN  Outcome: Met  10/3/2020 1011 by Steven Deal RN  Outcome: Ongoing, Progressing     Problem: Infection (Postpartum  Delivery)  Goal: Absence of Infection Signs/Symptoms  10/3/2020 0553  by Steven Deal RN  Outcome: Met  10/3/2020 1011 by Steven Deal RN  Outcome: Ongoing, Progressing     Problem: Pain (Postpartum  Delivery)  Goal: Acceptable Pain Control  10/3/2020 1225 by Steven Deal RN  Outcome: Met  10/3/2020 1011 by Steven Deal RN  Outcome: Ongoing, Progressing     Problem: Postoperative Nausea and Vomiting (Postpartum  Delivery)  Goal: Nausea and Vomiting Relief  10/3/2020 1225 by Steven Deal RN  Outcome: Met  10/3/2020 1011 by Steven Deal RN  Outcome: Ongoing, Progressing     Problem: Postoperative Urinary Retention (Postpartum  Delivery)  Goal: Effective Urinary Elimination  10/3/2020 1225 by Steven Deal RN  Outcome: Met  10/3/2020 1011 by Steven Deal RN  Outcome: Ongoing, Progressing     Problem: Breastfeeding  Goal: Effective Breastfeeding  10/3/2020 1225 by Steven Deal RN  Outcome: Met  10/3/2020 1011 by Steven Deal RN  Outcome: Ongoing, Progressing     Problem: Skin Injury Risk Increased  Goal: Skin Health and Integrity  10/3/2020 1225 by Steven Deal RN  Outcome: Met  10/3/2020 1011 by Steven Deal RN  Outcome: Ongoing, Progressing     Problem: Infection  Goal: Infection Symptom Resolution  10/3/2020 1225 by Steven Deal RN  Outcome: Met  10/3/2020 1011 by Steven Deal RN  Outcome: Ongoing, Progressing     Patient doing well. VS stable. Pain controlled well with oral medication. Patient to follow up in 6 weeks for PP check.

## 2020-10-03 NOTE — PROGRESS NOTES
[facility]  Obstetrics  Postpartum Progress Note    Patient Name: Jane Muller  MRN: [id number]  Admission Date: 10/1/2020  Hospital Length of Stay: 2 days  Attending Physician: Erwin Brandt MD  Primary Care Provider: Primary Doctor No    Subjective:     Principal Problem: delivery delivered    Hospital Course:  Patient underwent  delivery without complication.  POD 1: Patient is without complaint, reports difficulty latching on her right side.   POD 2: Feeling well, denies complaints    Interval History:     She is doing well this morning. She is tolerating a regular diet without nausea or vomiting. She is voiding spontaneously. She is ambulating. She has passed flatus, and has not a BM. Vaginal bleeding is mild. She denies fever or chills. Abdominal pain is mild and controlled with oral medications.     Objective:     Vital Signs (Most Recent):  Temp: 98.3 °F (36.8 °C) (10/03/20 075)  Pulse: 79 (10/03/20 075)  Resp: 17 (10/03/20 0754)  BP: 118/74 (10/03/20 075)  SpO2: 98 % (10/03/20 0754) Vital Signs (24h Range):  Temp:  [97.7 °F (36.5 °C)-98.5 °F (36.9 °C)] 98.3 °F (36.8 °C)  Pulse:  [72-96] 79  Resp:  [16-18] 17  SpO2:  [95 %-99 %] 98 %  BP: (111-120)/(61-74) 118/74     Weight: 78.5 kg (173 lb 1 oz)  Body mass index is 28.8 kg/m².      Intake/Output Summary (Last 24 hours) at 10/3/2020 0844  Last data filed at 10/2/2020 1000  Gross per 24 hour   Intake --   Output 500 ml   Net -500 ml           Significant Labs:  Lab Results   Component Value Date    GROUPTRH A POS 10/01/2020    HEPBSAG Negative 2020    STREPBCULT No Group B Streptococcus isolated 09/10/2020     Recent Labs   Lab 10/01/20  1906   HGB 9.2*   HCT 29.2*       I have personallly reviewed all pertinent lab results from the last 24 hours.    Physical Exam:   Constitutional: She is oriented to person, place, and time. She appears well-developed and well-nourished. No distress.    HENT:   Head:  Normocephalic and atraumatic.      Cardiovascular: Normal rate, regular rhythm, normal heart sounds and intact distal pulses.     Pulmonary/Chest: Effort normal. No respiratory distress.        Abdominal: Soft. Bowel sounds are normal. Abdominal incision: c/d/i. There is no guarding.   Firm fundus below umbilicus     Genitourinary:    Uterus normal.   negative for vaginal discharge          Musculoskeletal: Normal range of motion and moves all extremeties.       Neurological: She is alert and oriented to person, place, and time.    Skin: Skin is warm. She is not diaphoretic.    Psychiatric: She has a normal mood and affect. Her behavior is normal. Judgment and thought content normal.       Assessment/Plan:     30 y.o. female  for:    *  delivery delivered  Routine post operative care    Gestational diabetes mellitus (GDM) in second trimester  Plan 6 week GTT    History of weight loss surgery  Encourage iron supplementation, discourage extended use of NSAIDs.        Disposition: As patient meets milestones, will plan to discharge POD#2-4.    Gabrielle Roberson MD  Obstetrics  [facility]

## 2020-10-03 NOTE — DISCHARGE SUMMARY
[facility]  Obstetrics  Discharge Summary      Patient Name: Jane Muller  MRN: [id number]  Admission Date: 10/1/2020  Hospital Length of Stay: 2 days  Discharge Date and Time:  10/03/2020 8:46 AM  Attending Physician: Nino Brandt MD   Discharging Provider: Gabrielle Roberson MD   Primary Care Provider: Primary Doctor No    HPI: 29 yo  presents at 39 weeks for primary elective . Pregnancy is significant for GDM diet controlled and history of gastric sleeve.        Procedure(s) (LRB):   SECTION (N/A)     Hospital Course:   Patient underwent  delivery without complication.  POD 1: Patient is without complaint, reports difficulty latching on her right side.   POD 2: Feeling well, denies complaints     Consults (From admission, onward)        Status Ordering Provider     Inpatient consult to Anesthesiology  Once     Provider:  (Not yet assigned)    Acknowledged NINO BRANDT          Final Active Diagnoses:    Diagnosis Date Noted POA    PRINCIPAL PROBLEM:   delivery delivered [O82] 10/02/2020 No    Gestational diabetes mellitus (GDM) in second trimester [O24.419] 2020 Yes    History of weight loss surgery [Z98.84] 2020 Not Applicable      Problems Resolved During this Admission:    Diagnosis Date Noted Date Resolved POA    Pregnancy [Z34.90] 10/01/2020 10/02/2020 Not Applicable        Significant Diagnostic Studies: Labs: All labs within the past 24 hours have been reviewed      Feeding Method: see RN note    Immunizations     Date Immunization Status Dose Route/Site Given by    10/03/20 0736 MMR Deferred 0.5 mL Subcutaneous/ Steven Deal RN    10/03/20 0737 Tdap Deferred 0.5 mL Intramuscular/ Steven Deal RN          Delivery:    Episiotomy:     Lacerations:     Repair suture:     Repair # of packets:     Blood loss (ml):       Birth information:  YOB: 2019   Time of birth: 7:04 AM   Sex: male   Delivery type:  , Low Transverse   Gestational Age: 39w1d    Delivery Clinician:      Other providers:       Additional  information:  Forceps:    Vacuum:    Breech:    Observed anomalies      Living?:           APGARS  One minute Five minutes Ten minutes   Skin color:         Heart rate:         Grimace:         Muscle tone:         Breathing:         Totals: 9  9        Placenta: Delivered:       appearance    Pending Diagnostic Studies:     None          Discharged Condition: good    Disposition: Home or Self Care    Follow Up:  Follow-up Information     Erwin Brandt MD In 2 weeks.    Specialties: Obstetrics and Gynecology, Obstetrics, Gynecology  Why: For wound re-check  Contact information:  [address]  [address]  [facility] [id number] [phone]             Erwin Brandt MD In 6 weeks.    Specialties: Obstetrics and Gynecology, Obstetrics, Gynecology  Why: Postpartum  Contact information:  [address]  [address]  [facility] [address] [phone]                 Patient Instructions:   Patient Instructions:   1. Call the office for any bleeding >2 pads/hour for >2 hours, temperature >100.4, pain that is uncontrolled with medications, or for any other concerns.  2. Let soap and water run over incision, do not scrub. Keep incision dry.  3. No driving while on narcotics.  4. Call if malodorous drainage or redness from incision  5. Call office for overwhelming feelings of anxiety or sadness greater than 50% of the time  6. If no bowel movement for 1-2 days after discharge home, start Miralax (over the counter - take per package instructions) once a day. If still no bowel movement, increase to twice a day. Decrease to once a day or discontinue once having regular bowel movements.  No discharge procedures on file.  Medications:  Current Discharge Medication List      START taking these medications    Details   docusate sodium (COLACE) 100 MG capsule Take 2 capsules (200 mg total) by mouth 2 (two) times  daily.  Qty: 30 capsule, Refills: 0      ibuprofen (ADVIL,MOTRIN) 800 MG tablet Take 1 tablet (800 mg total) by mouth every 8 (eight) hours.  Qty: 20 tablet, Refills: 0      oxyCODONE-acetaminophen (PERCOCET) 5-325 mg per tablet Take 1 tablet by mouth every 4 (four) hours as needed for Pain.  Qty: 20 tablet, Refills: 0    Comments: Quantity prescribed more than 7 day supply? No         CONTINUE these medications which have NOT CHANGED    Details   esomeprazole (NEXIUM) 20 MG capsule Take 20 mg by mouth before breakfast.      prenatal 25/iron fum/folic/dha (PRENATAL-1 ORAL) Take by mouth.      vitamin D (VITAMIN D3) 1000 units Tab Take 1,000 Units by mouth once daily.         STOP taking these medications       magnesium 30 mg Tab Comments:   Reason for Stopping:         blood sugar diagnostic Strp Comments:   Reason for Stopping:         blood-glucose meter kit Comments:   Reason for Stopping:         lancets Misc Comments:   Reason for Stopping:               Gabrielle Roberson MD  Obstetrics  [facility]

## 2020-10-14 ENCOUNTER — NURSE TRIAGE (OUTPATIENT)
Dept: ADMINISTRATIVE | Facility: CLINIC | Age: 30
End: 2020-10-14

## 2020-10-15 ENCOUNTER — POSTPARTUM VISIT (OUTPATIENT)
Dept: OBSTETRICS AND GYNECOLOGY | Facility: CLINIC | Age: 30
End: 2020-10-15
Attending: OBSTETRICS & GYNECOLOGY
Payer: COMMERCIAL

## 2020-10-15 VITALS
BODY MASS INDEX: 26.43 KG/M2 | DIASTOLIC BLOOD PRESSURE: 68 MMHG | SYSTOLIC BLOOD PRESSURE: 110 MMHG | WEIGHT: 158.63 LBS | HEIGHT: 65 IN

## 2020-10-15 DIAGNOSIS — Z48.89 POSTOPERATIVE VISIT: Primary | ICD-10-CM

## 2020-10-15 PROCEDURE — 99024 POSTOP FOLLOW-UP VISIT: CPT | Mod: S$GLB,,, | Performed by: OBSTETRICS & GYNECOLOGY

## 2020-10-15 PROCEDURE — 99024 PR POST-OP FOLLOW-UP VISIT: ICD-10-PCS | Mod: S$GLB,,, | Performed by: OBSTETRICS & GYNECOLOGY

## 2020-10-15 PROCEDURE — 99999 PR PBB SHADOW E&M-EST. PATIENT-LVL III: CPT | Mod: PBBFAC,,, | Performed by: OBSTETRICS & GYNECOLOGY

## 2020-10-15 PROCEDURE — 99999 PR PBB SHADOW E&M-EST. PATIENT-LVL III: ICD-10-PCS | Mod: PBBFAC,,, | Performed by: OBSTETRICS & GYNECOLOGY

## 2020-10-15 NOTE — PROGRESS NOTES
Subjective:   Patient reports no nausea or vomiting.    Activity level: Normal.    Pain control: Well controlled.    Reports no postpartum depression, overall doing well.     Objective:  Vitals:    10/15/20 1054   BP: 110/68     General appearance: Comfortable and well-appearing.    Abdomen: Abdomen is soft, non distended   Tenderness: There is no abdominal tenderness.    Wound:  Clean.  There is no dehiscence.  There is no drainage.      Assessment:   s/p  delivery- 2 week post op  Condition: In stable condition.     Plan:  Encourage ambulation.  Continue wound care.  Pelvic rest for 6 weeks postpartum.

## 2020-11-13 ENCOUNTER — POSTPARTUM VISIT (OUTPATIENT)
Dept: OBSTETRICS AND GYNECOLOGY | Facility: CLINIC | Age: 30
End: 2020-11-13
Attending: OBSTETRICS & GYNECOLOGY
Payer: COMMERCIAL

## 2020-11-13 VITALS — BODY MASS INDEX: 26.41 KG/M2 | WEIGHT: 158.5 LBS | HEIGHT: 65 IN

## 2020-11-13 PROCEDURE — 99999 PR PBB SHADOW E&M-EST. PATIENT-LVL III: CPT | Mod: PBBFAC,,, | Performed by: OBSTETRICS & GYNECOLOGY

## 2020-11-13 PROCEDURE — 0503F PR POSTPARTUM CARE VISIT: ICD-10-PCS | Mod: S$GLB,,, | Performed by: OBSTETRICS & GYNECOLOGY

## 2020-11-13 PROCEDURE — 0503F POSTPARTUM CARE VISIT: CPT | Mod: S$GLB,,, | Performed by: OBSTETRICS & GYNECOLOGY

## 2020-11-13 PROCEDURE — 99999 PR PBB SHADOW E&M-EST. PATIENT-LVL III: ICD-10-PCS | Mod: PBBFAC,,, | Performed by: OBSTETRICS & GYNECOLOGY

## 2020-11-14 NOTE — PROGRESS NOTES
"CC: 6 wk pp check    Jane Muller is a 30 y.o. female  is 6 wk s/p uncomplicated elective LTCS per pt request.  No complaints.  Breast and bottle feeding. No bleeding.  Declines contraception, when stops breastfeeding wants regular ocps. Denies depression.     Past Medical History:   Diagnosis Date    Anxiety        Past Surgical History:   Procedure Laterality Date     SECTION N/A 10/1/2020    Procedure:  SECTION;  Surgeon: Erwin Brandt MD;  Location: [facility], [place], operated by [facility];  Service: OB/GYN;  Laterality: N/A;    STOMACH SURGERY  2017    Dr Owens - sleeve gastrectomy - was 190lbs       OB History    Para Term  AB Living   1 1 1 0 0 1   SAB TAB Ectopic Multiple Live Births   0 0 0 0 1      # Outcome Date GA Lbr Maxwell/2nd Weight Sex Delivery Anes PTL Lv   1 Term 10/01/20 39w1d  3.26 kg (7 lb 3 oz) M CS-LTranv Spinal  TRANG       Family History   Problem Relation Age of Onset    Heart attack Father     Hypertension Father     No Known Problems Brother     Breast cancer Neg Hx     Colon cancer Neg Hx     Ovarian cancer Neg Hx        Social History     Tobacco Use    Smoking status: Former Smoker    Smokeless tobacco: Never Used   Substance Use Topics    Alcohol use: Yes     Comment: social    Drug use: No       Ht 5' 5" (1.651 m)   Wt 71.9 kg (158 lb 8.2 oz)   LMP 2019   Breastfeeding Yes   BMI 26.38 kg/m²     ROS:  GENERAL: Denies weight gain or weight loss. Feeling well overall.   SKIN: Denies rash or lesions.   HEAD: Denies head injury or headache.   NODES: Denies enlarged lymph nodes.   CHEST: Denies chest pain or shortness of breath.   CARDIOVASCULAR: Denies palpitations or left sided chest pain.   ABDOMEN: No abdominal pain, constipation, diarrhea, nausea, vomiting or rectal bleeding.   URINARY: No frequency, dysuria, hematuria, or burning on urination.  REPRODUCTIVE: See HPI.   BREASTS: denies pain, lumps, or nipple discharge.   HEMATOLOGIC: No easy " bruisability or excessive bleeding.  MUSCULOSKELETAL: Denies joint pain or swelling.   NEUROLOGIC: Denies syncope or weakness.   PSYCHIATRIC: Denies depression, anxiety or mood swings.    Physical Exam:    APPEARANCE: Well nourished, well developed, in no acute distress.  AFFECT: WNL, alert and oriented x 3  SKIN: No acne or hirsutism  NECK: Neck symmetric without masses or thyromegaly  NODES: No inguinal, cervical, axillary, or femoral lymph node enlargement  CHEST: Good respiratory effect  ABDOMEN: Soft.  No tenderness or masses.  No hepatosplenomegaly.  No hernias.  PELVIC: Normal external genitalia without lesions.  Normal hair distribution.  Adequate perineal body, normal urethral meatus.  Vagina moist and well rugated without lesions or discharge.  Cervix pink, without lesions, discharge or tenderness.  No significant cystocele or rectocele.  Bimanual exam shows uterus to be normal size, regular, mobile and nontender.  Adnexa without masses or tenderness.    EXTREMITIES: No edema.    ASSESSMENT AND PLAN    Jane was seen today for postpartum follow-up.    Diagnoses and all orders for this visit:    Encounter for postpartum visit    cleared    Follow up in about 3 months (around 2/13/2021) for WWE.

## 2021-02-05 NOTE — DISCHARGE INSTRUCTIONS
Basic Breastfeeding Instructions     The more you nurse the baby the more milk you will make.   Avoid bottles and pacifiers for the first 4 weeks.   Feed your baby only breastmik for the first 6 months.   Feed your baby at the earliest sign of hunger or comfort:  o Sucking on fingers or hands  o Bringing hands toward his mouth  o Rooting or reaching for something to suck on  o Sucking motions with mouth  o Fretful noises  o Crying is a late sign of hunger or comfort.   The baby should be positioned and latched on to the breast correctly  o Chest-to-chest, chin in the breast  o Babys lips are flipped outward  o Babys mouth is stretched open wide like a shout  o Babys sucking should feel like tugging to the mother  - The baby should be drinking at the breast  o You should hear an occasional swallow during the feeding  o Switch breasts when the baby takes himself off the breast or falls asleep  o Keep offering breasts until the baby looks full, no longer gives hunger signs, and stays asleep when placed on his back in the crib  - If the baby is sleepy and wont wake for a feeding, put the baby skin-to-skin dressed in a diaper against the mothers bare chest  - Sleep with your baby near you in the hospital room  - Call the nurse for additional assistance as needed.          Breastfeeding Discharge Instructions       Feed the baby at the earliest sign of hunger or comfort  o Hands to mouth, sucking motions  o Rooting or searching for something to suck on  o Dont wait for crying - it is a sign of distress     The feedings may be 8-12 times per 24hrs and will not follow a schedule   Avoid pacifiers and bottles for the first 4 weeks   Alternate the breast you start the feeding with, or start with the breast that feels the fullest   Switch breasts when the baby takes himself off the breast or falls asleep   Keep offering breasts until the baby looks full, no longer gives hunger signs, and stays asleep when  placed on his back in the crib   If the baby is sleepy and wont wake for a feeding, put the baby skin-to-skin dressed in a diaper against the mothers bare chest   Sleep near your baby   The baby should be positioned and latched on to the breast correctly  o Chest-to-chest, chin in the breast  o Babys lips are flipped outward  o Babys mouth is stretched open wide like a shout  o Babys sucking should feel like tugging to the mother  - The baby should be drinking at the breast:  o You should hear swallowing or gulping throughout the feeding  o You should see milk on the babys lips when he comes off the breast  o Your breasts should be softer when the baby is finished feeding  o The baby should look relaxed at the end of feedings  o After the 4th day and your milk is in:  o The babys poop should turn bright yellow and be loose, watery, and seedy  o The baby should have at least 3-4 poops the size of the palm of your hand per day  o The baby should have at least 5-6 wet diapers per day  o The urine should be light yellow in color  You should drink when you are thirsty and eat a healthy diet when you are    hungry.     Take naps to get the rest you need.   Take medications and/or drink alcohol only with permission of your obstetrician    or the babys pediatrician.  You can also call the Infant Risk Center,   ([phone]), Monday-Friday, 8am-5pm Central time, to get the most   up-to-date evidence-based information on the use of medications during   pregnancy and breastfeeding.      The baby should be examined by a pediatrician at 3-5 days of age.  Once your   milk comes in, the baby should be gaining at least ½ - 1oz each day and should be back to birthweight no later than 10-14 days of age.          Community Resources    [facility] Breastfeeding Warmline: [phone]   Local [facility] clinics: provide incentives and breastpumps to eligible mothers  La Leche League International (LLLI):   mother-to-mother support group website        www.lll.org  [facility] La Leche League mother-to-mother support groups:        www.Ventrix        La Leche League [facility]   Dr. Joel Lim website for latch videos and general information:        www.breastfeedinginc.ca  [facility] Risk [facility] is a call center that provides information about the safety of taking medications while breastfeeding.  Call [phone], M-F, 8am-5pm, CT.  International Lactation Consultant Association provides resources for assistance:        www.ilca.org  [place] [employer] Breastfeeding Coalition provides informationand resources for parents  and the community    www.LaBreastfeedingSupport.org     Edwina Arias is a mom-to-mom support group:                             www.[employer].com//breastfeedng-support/  Partners for Healthy Babies:  5-330-220-BABY(5287)  C[employer]: a breastfeeding support group for women of color, [phone]             0

## 2021-03-29 ENCOUNTER — LAB VISIT (OUTPATIENT)
Dept: LAB | Facility: OTHER | Age: 31
End: 2021-03-29
Payer: COMMERCIAL

## 2021-03-29 DIAGNOSIS — Z13.220 SCREENING FOR LIPOID DISORDERS: ICD-10-CM

## 2021-03-29 DIAGNOSIS — Z13.21 SCREENING FOR MALNUTRITION: ICD-10-CM

## 2021-03-29 DIAGNOSIS — Z79.899 ENCOUNTER FOR LONG-TERM (CURRENT) USE OF OTHER MEDICATIONS: ICD-10-CM

## 2021-03-29 DIAGNOSIS — Z13.29 SCREENING FOR THYROID DISORDER: ICD-10-CM

## 2021-03-29 DIAGNOSIS — Z13.0 SCREENING FOR IRON DEFICIENCY ANEMIA: ICD-10-CM

## 2021-03-29 DIAGNOSIS — R53.83 FATIGUE: Primary | ICD-10-CM

## 2021-03-29 LAB
25(OH)D3+25(OH)D2 SERPL-MCNC: 28 NG/ML (ref 30–96)
ALBUMIN SERPL BCP-MCNC: 4 G/DL (ref 3.5–5.2)
ALP SERPL-CCNC: 81 U/L (ref 55–135)
ALT SERPL W/O P-5'-P-CCNC: 13 U/L (ref 10–44)
ANION GAP SERPL CALC-SCNC: 9 MMOL/L (ref 8–16)
AST SERPL-CCNC: 19 U/L (ref 10–40)
BASOPHILS # BLD AUTO: 0.02 K/UL (ref 0–0.2)
BASOPHILS NFR BLD: 0.3 % (ref 0–1.9)
BILIRUB SERPL-MCNC: 0.7 MG/DL (ref 0.1–1)
BUN SERPL-MCNC: 12 MG/DL (ref 6–20)
CALCIUM SERPL-MCNC: 9.6 MG/DL (ref 8.7–10.5)
CHLORIDE SERPL-SCNC: 107 MMOL/L (ref 95–110)
CO2 SERPL-SCNC: 23 MMOL/L (ref 23–29)
CREAT SERPL-MCNC: 0.8 MG/DL (ref 0.5–1.4)
DIFFERENTIAL METHOD: ABNORMAL
EOSINOPHIL # BLD AUTO: 0.1 K/UL (ref 0–0.5)
EOSINOPHIL NFR BLD: 2 % (ref 0–8)
ERYTHROCYTE [DISTWIDTH] IN BLOOD BY AUTOMATED COUNT: 13 % (ref 11.5–14.5)
EST. GFR  (AFRICAN AMERICAN): >60 ML/MIN/1.73 M^2
EST. GFR  (NON AFRICAN AMERICAN): >60 ML/MIN/1.73 M^2
GLUCOSE SERPL-MCNC: 84 MG/DL (ref 70–110)
HCT VFR BLD AUTO: 40.2 % (ref 37–48.5)
HGB BLD-MCNC: 12.8 G/DL (ref 12–16)
IMM GRANULOCYTES # BLD AUTO: 0.02 K/UL (ref 0–0.04)
IMM GRANULOCYTES NFR BLD AUTO: 0.3 % (ref 0–0.5)
LYMPHOCYTES # BLD AUTO: 2.7 K/UL (ref 1–4.8)
LYMPHOCYTES NFR BLD: 38.1 % (ref 18–48)
MAGNESIUM SERPL-MCNC: 1.7 MG/DL (ref 1.6–2.6)
MCH RBC QN AUTO: 29.4 PG (ref 27–31)
MCHC RBC AUTO-ENTMCNC: 31.8 G/DL (ref 32–36)
MCV RBC AUTO: 92 FL (ref 82–98)
MONOCYTES # BLD AUTO: 0.6 K/UL (ref 0.3–1)
MONOCYTES NFR BLD: 8 % (ref 4–15)
NEUTROPHILS # BLD AUTO: 3.7 K/UL (ref 1.8–7.7)
NEUTROPHILS NFR BLD: 51.3 % (ref 38–73)
NRBC BLD-RTO: 0 /100 WBC
PLATELET # BLD AUTO: 273 K/UL (ref 150–450)
PMV BLD AUTO: 11.2 FL (ref 9.2–12.9)
POTASSIUM SERPL-SCNC: 4.5 MMOL/L (ref 3.5–5.1)
PROT SERPL-MCNC: 7.7 G/DL (ref 6–8.4)
RBC # BLD AUTO: 4.35 M/UL (ref 4–5.4)
SODIUM SERPL-SCNC: 139 MMOL/L (ref 136–145)
TSH SERPL DL<=0.005 MIU/L-ACNC: 1.35 UIU/ML (ref 0.4–4)
VIT B12 SERPL-MCNC: 470 PG/ML (ref 210–950)
WBC # BLD AUTO: 7.11 K/UL (ref 3.9–12.7)

## 2021-03-29 PROCEDURE — 84443 ASSAY THYROID STIM HORMONE: CPT

## 2021-03-29 PROCEDURE — 82607 VITAMIN B-12: CPT

## 2021-03-29 PROCEDURE — 82306 VITAMIN D 25 HYDROXY: CPT

## 2021-03-29 PROCEDURE — 36415 COLL VENOUS BLD VENIPUNCTURE: CPT

## 2021-03-29 PROCEDURE — 80053 COMPREHEN METABOLIC PANEL: CPT

## 2021-03-29 PROCEDURE — 83735 ASSAY OF MAGNESIUM: CPT

## 2021-03-29 PROCEDURE — 85025 COMPLETE CBC W/AUTO DIFF WBC: CPT

## 2021-03-29 PROCEDURE — 84425 ASSAY OF VITAMIN B-1: CPT

## 2021-10-21 ENCOUNTER — TELEPHONE (OUTPATIENT)
Dept: OBSTETRICS AND GYNECOLOGY | Facility: CLINIC | Age: 31
End: 2021-10-21

## 2021-10-21 ENCOUNTER — PATIENT MESSAGE (OUTPATIENT)
Dept: OBSTETRICS AND GYNECOLOGY | Facility: CLINIC | Age: 31
End: 2021-10-21
Payer: COMMERCIAL

## 2021-10-22 ENCOUNTER — TELEPHONE (OUTPATIENT)
Dept: OBSTETRICS AND GYNECOLOGY | Facility: CLINIC | Age: 31
End: 2021-10-22

## 2021-10-22 DIAGNOSIS — N92.6 MISSED MENSES: Primary | ICD-10-CM

## 2021-11-08 ENCOUNTER — PATIENT MESSAGE (OUTPATIENT)
Dept: OBSTETRICS AND GYNECOLOGY | Facility: CLINIC | Age: 31
End: 2021-11-08
Payer: COMMERCIAL

## 2021-11-15 ENCOUNTER — TELEPHONE (OUTPATIENT)
Dept: OBSTETRICS AND GYNECOLOGY | Facility: CLINIC | Age: 31
End: 2021-11-15
Payer: COMMERCIAL

## 2021-11-16 ENCOUNTER — PATIENT MESSAGE (OUTPATIENT)
Dept: OBSTETRICS AND GYNECOLOGY | Facility: CLINIC | Age: 31
End: 2021-11-16
Payer: COMMERCIAL

## 2021-11-22 ENCOUNTER — PATIENT MESSAGE (OUTPATIENT)
Dept: OBSTETRICS AND GYNECOLOGY | Facility: CLINIC | Age: 31
End: 2021-11-22
Payer: COMMERCIAL

## 2021-11-23 ENCOUNTER — LAB VISIT (OUTPATIENT)
Dept: LAB | Facility: OTHER | Age: 31
End: 2021-11-23
Attending: OBSTETRICS & GYNECOLOGY
Payer: COMMERCIAL

## 2021-11-23 ENCOUNTER — PATIENT MESSAGE (OUTPATIENT)
Dept: OBSTETRICS AND GYNECOLOGY | Facility: CLINIC | Age: 31
End: 2021-11-23

## 2021-11-23 ENCOUNTER — PROCEDURE VISIT (OUTPATIENT)
Dept: OBSTETRICS AND GYNECOLOGY | Facility: CLINIC | Age: 31
End: 2021-11-23
Payer: COMMERCIAL

## 2021-11-23 ENCOUNTER — INITIAL PRENATAL (OUTPATIENT)
Dept: OBSTETRICS AND GYNECOLOGY | Facility: CLINIC | Age: 31
End: 2021-11-23
Payer: COMMERCIAL

## 2021-11-23 VITALS — DIASTOLIC BLOOD PRESSURE: 68 MMHG | SYSTOLIC BLOOD PRESSURE: 110 MMHG

## 2021-11-23 DIAGNOSIS — Z3A.11 11 WEEKS GESTATION OF PREGNANCY: ICD-10-CM

## 2021-11-23 DIAGNOSIS — N92.6 MISSED MENSES: ICD-10-CM

## 2021-11-23 DIAGNOSIS — Z34.90 PREGNANCY, UNSPECIFIED GESTATIONAL AGE: Primary | ICD-10-CM

## 2021-11-23 DIAGNOSIS — Z34.90 PREGNANCY, UNSPECIFIED GESTATIONAL AGE: ICD-10-CM

## 2021-11-23 LAB
ABO + RH BLD: NORMAL
BASOPHILS # BLD AUTO: 0.03 K/UL (ref 0–0.2)
BASOPHILS NFR BLD: 0.2 % (ref 0–1.9)
BLD GP AB SCN CELLS X3 SERPL QL: NORMAL
DIFFERENTIAL METHOD: ABNORMAL
EOSINOPHIL # BLD AUTO: 0.1 K/UL (ref 0–0.5)
EOSINOPHIL NFR BLD: 0.7 % (ref 0–8)
ERYTHROCYTE [DISTWIDTH] IN BLOOD BY AUTOMATED COUNT: 12.4 % (ref 11.5–14.5)
HCT VFR BLD AUTO: 37.5 % (ref 37–48.5)
HGB BLD-MCNC: 12.1 G/DL (ref 12–16)
IMM GRANULOCYTES # BLD AUTO: 0.07 K/UL (ref 0–0.04)
IMM GRANULOCYTES NFR BLD AUTO: 0.6 % (ref 0–0.5)
LYMPHOCYTES # BLD AUTO: 3.1 K/UL (ref 1–4.8)
LYMPHOCYTES NFR BLD: 24.9 % (ref 18–48)
MCH RBC QN AUTO: 29.7 PG (ref 27–31)
MCHC RBC AUTO-ENTMCNC: 32.3 G/DL (ref 32–36)
MCV RBC AUTO: 92 FL (ref 82–98)
MONOCYTES # BLD AUTO: 0.8 K/UL (ref 0.3–1)
MONOCYTES NFR BLD: 6.7 % (ref 4–15)
NEUTROPHILS # BLD AUTO: 8.3 K/UL (ref 1.8–7.7)
NEUTROPHILS NFR BLD: 66.9 % (ref 38–73)
NRBC BLD-RTO: 0 /100 WBC
PLATELET # BLD AUTO: 289 K/UL (ref 150–450)
PMV BLD AUTO: 10.6 FL (ref 9.2–12.9)
RBC # BLD AUTO: 4.08 M/UL (ref 4–5.4)
RPR SER QL: NORMAL
WBC # BLD AUTO: 12.38 K/UL (ref 3.9–12.7)

## 2021-11-23 PROCEDURE — 76801 US OB/GYN EXTENDED PROCEDURE (VIEWPOINT): ICD-10-PCS | Mod: S$GLB,,, | Performed by: OBSTETRICS & GYNECOLOGY

## 2021-11-23 PROCEDURE — 87340 HEPATITIS B SURFACE AG IA: CPT | Performed by: OBSTETRICS & GYNECOLOGY

## 2021-11-23 PROCEDURE — 85025 COMPLETE CBC W/AUTO DIFF WBC: CPT | Performed by: OBSTETRICS & GYNECOLOGY

## 2021-11-23 PROCEDURE — 86592 SYPHILIS TEST NON-TREP QUAL: CPT | Performed by: OBSTETRICS & GYNECOLOGY

## 2021-11-23 PROCEDURE — 76801 OB US < 14 WKS SINGLE FETUS: CPT | Mod: S$GLB,,, | Performed by: OBSTETRICS & GYNECOLOGY

## 2021-11-23 PROCEDURE — 86900 BLOOD TYPING SEROLOGIC ABO: CPT | Performed by: OBSTETRICS & GYNECOLOGY

## 2021-11-23 PROCEDURE — 0502F SUBSEQUENT PRENATAL CARE: CPT | Mod: CPTII,S$GLB,, | Performed by: OBSTETRICS & GYNECOLOGY

## 2021-11-23 PROCEDURE — 99999 PR PBB SHADOW E&M-EST. PATIENT-LVL I: CPT | Mod: PBBFAC,,, | Performed by: OBSTETRICS & GYNECOLOGY

## 2021-11-23 PROCEDURE — 87389 HIV-1 AG W/HIV-1&-2 AB AG IA: CPT | Performed by: OBSTETRICS & GYNECOLOGY

## 2021-11-23 PROCEDURE — 36415 COLL VENOUS BLD VENIPUNCTURE: CPT | Performed by: OBSTETRICS & GYNECOLOGY

## 2021-11-23 PROCEDURE — 0502F PR SUBSEQUENT PRENATAL CARE: ICD-10-PCS | Mod: CPTII,S$GLB,, | Performed by: OBSTETRICS & GYNECOLOGY

## 2021-11-23 PROCEDURE — 86762 RUBELLA ANTIBODY: CPT | Performed by: OBSTETRICS & GYNECOLOGY

## 2021-11-23 PROCEDURE — 99999 PR PBB SHADOW E&M-EST. PATIENT-LVL I: ICD-10-PCS | Mod: PBBFAC,,, | Performed by: OBSTETRICS & GYNECOLOGY

## 2021-11-23 RX ORDER — LETROZOLE 2.5 MG/1
TABLET, FILM COATED ORAL
COMMUNITY
Start: 2021-09-13 | End: 2021-12-20

## 2021-11-24 LAB
HBV SURFACE AG SERPL QL IA: NEGATIVE
HIV 1+2 AB+HIV1 P24 AG SERPL QL IA: NEGATIVE
RUBV IGG SER-ACNC: 17.2 IU/ML
RUBV IGG SER-IMP: REACTIVE

## 2021-11-29 ENCOUNTER — PATIENT MESSAGE (OUTPATIENT)
Dept: ADMINISTRATIVE | Facility: OTHER | Age: 31
End: 2021-11-29
Payer: COMMERCIAL

## 2021-12-06 ENCOUNTER — PATIENT MESSAGE (OUTPATIENT)
Dept: ADMINISTRATIVE | Facility: OTHER | Age: 31
End: 2021-12-06
Payer: COMMERCIAL

## 2021-12-20 ENCOUNTER — ROUTINE PRENATAL (OUTPATIENT)
Dept: OBSTETRICS AND GYNECOLOGY | Facility: CLINIC | Age: 31
End: 2021-12-20
Payer: COMMERCIAL

## 2021-12-20 VITALS
BODY MASS INDEX: 27.07 KG/M2 | SYSTOLIC BLOOD PRESSURE: 112 MMHG | WEIGHT: 162.69 LBS | DIASTOLIC BLOOD PRESSURE: 64 MMHG

## 2021-12-20 DIAGNOSIS — O34.219 HISTORY OF CESAREAN DELIVERY, ANTEPARTUM: ICD-10-CM

## 2021-12-20 DIAGNOSIS — Z86.32 HISTORY OF GESTATIONAL DIABETES IN PRIOR PREGNANCY, CURRENTLY PREGNANT: Primary | ICD-10-CM

## 2021-12-20 DIAGNOSIS — O21.9 NAUSEA/VOMITING IN PREGNANCY: ICD-10-CM

## 2021-12-20 DIAGNOSIS — O09.299 HISTORY OF GESTATIONAL DIABETES IN PRIOR PREGNANCY, CURRENTLY PREGNANT: Primary | ICD-10-CM

## 2021-12-20 DIAGNOSIS — Z3A.15 15 WEEKS GESTATION OF PREGNANCY: ICD-10-CM

## 2021-12-20 PROCEDURE — 87591 N.GONORRHOEAE DNA AMP PROB: CPT | Performed by: OBSTETRICS & GYNECOLOGY

## 2021-12-20 PROCEDURE — 99999 PR PBB SHADOW E&M-EST. PATIENT-LVL II: ICD-10-PCS | Mod: PBBFAC,,, | Performed by: OBSTETRICS & GYNECOLOGY

## 2021-12-20 PROCEDURE — 99999 PR PBB SHADOW E&M-EST. PATIENT-LVL II: CPT | Mod: PBBFAC,,, | Performed by: OBSTETRICS & GYNECOLOGY

## 2021-12-20 PROCEDURE — 87086 URINE CULTURE/COLONY COUNT: CPT | Performed by: OBSTETRICS & GYNECOLOGY

## 2021-12-20 PROCEDURE — 87491 CHLMYD TRACH DNA AMP PROBE: CPT | Performed by: OBSTETRICS & GYNECOLOGY

## 2021-12-20 PROCEDURE — 0502F PR SUBSEQUENT PRENATAL CARE: ICD-10-PCS | Mod: CPTII,S$GLB,, | Performed by: OBSTETRICS & GYNECOLOGY

## 2021-12-20 PROCEDURE — 0502F SUBSEQUENT PRENATAL CARE: CPT | Mod: CPTII,S$GLB,, | Performed by: OBSTETRICS & GYNECOLOGY

## 2021-12-20 RX ORDER — ONDANSETRON 4 MG/1
4 TABLET, FILM COATED ORAL DAILY PRN
Qty: 30 TABLET | Refills: 1 | Status: SHIPPED | OUTPATIENT
Start: 2021-12-20 | End: 2022-01-24 | Stop reason: SDUPTHER

## 2021-12-21 LAB — BACTERIA UR CULT: NO GROWTH

## 2021-12-29 LAB
C TRACH DNA SPEC QL NAA+PROBE: NOT DETECTED
N GONORRHOEA DNA SPEC QL NAA+PROBE: NOT DETECTED

## 2022-01-05 ENCOUNTER — TELEPHONE (OUTPATIENT)
Dept: OBSTETRICS AND GYNECOLOGY | Facility: CLINIC | Age: 32
End: 2022-01-05
Payer: COMMERCIAL

## 2022-01-05 ENCOUNTER — ROUTINE PRENATAL (OUTPATIENT)
Dept: OBSTETRICS AND GYNECOLOGY | Facility: CLINIC | Age: 32
End: 2022-01-05
Payer: COMMERCIAL

## 2022-01-05 VITALS — DIASTOLIC BLOOD PRESSURE: 70 MMHG | BODY MASS INDEX: 27.46 KG/M2 | SYSTOLIC BLOOD PRESSURE: 116 MMHG | WEIGHT: 165 LBS

## 2022-01-05 DIAGNOSIS — Z3A.17 17 WEEKS GESTATION OF PREGNANCY: ICD-10-CM

## 2022-01-05 DIAGNOSIS — O21.9 NAUSEA/VOMITING IN PREGNANCY: Primary | ICD-10-CM

## 2022-01-05 PROCEDURE — 0502F PR SUBSEQUENT PRENATAL CARE: ICD-10-PCS | Mod: CPTII,S$GLB,, | Performed by: OBSTETRICS & GYNECOLOGY

## 2022-01-05 PROCEDURE — 0502F SUBSEQUENT PRENATAL CARE: CPT | Mod: CPTII,S$GLB,, | Performed by: OBSTETRICS & GYNECOLOGY

## 2022-01-05 PROCEDURE — 99999 PR PBB SHADOW E&M-EST. PATIENT-LVL II: ICD-10-PCS | Mod: PBBFAC,,, | Performed by: OBSTETRICS & GYNECOLOGY

## 2022-01-05 PROCEDURE — 99999 PR PBB SHADOW E&M-EST. PATIENT-LVL II: CPT | Mod: PBBFAC,,, | Performed by: OBSTETRICS & GYNECOLOGY

## 2022-01-05 RX ORDER — PROMETHAZINE HYDROCHLORIDE 25 MG/1
25 TABLET ORAL EVERY 4 HOURS PRN
Qty: 20 TABLET | Refills: 1 | Status: SHIPPED | OUTPATIENT
Start: 2022-01-05 | End: 2022-03-07

## 2022-01-05 NOTE — TELEPHONE ENCOUNTER
17 and 2 OB pt reports having a weird feeling today and requesting to get dopplered to have peace of mind.  Says she has felt baby movements in flutters but hasn't felt movement in some time.  Also says she's been throwing up more than usual.  Feels likes she's throwing up every 30 minutes.  Took Zofran this AM but threw that up.      Advised that she is still early in her pregnancy and will not feel regular movements until 28 weeks.  Pt was persistent in being seen.  Confirmed with milena Walton for pt to be seen today to verify HTs.    Scheduled today at [facility] at 1:00.

## 2022-01-05 NOTE — PROGRESS NOTES
17w2d presents with c/o N/V for the past 2 days - prior to this zofran had been working well. Son has a cold and she did not sleep well last night. No diarrhea. No one else in the household with GI symptoms.   Pt felt fetal flutters 1-2 weeks ago but none since and is worried.   Phenergan sent in, hydration instructions given, reassurance provided.   RTC for scheduled routine PNC.

## 2022-01-13 ENCOUNTER — PATIENT MESSAGE (OUTPATIENT)
Dept: MATERNAL FETAL MEDICINE | Facility: CLINIC | Age: 32
End: 2022-01-13
Payer: COMMERCIAL

## 2022-01-18 ENCOUNTER — PROCEDURE VISIT (OUTPATIENT)
Dept: MATERNAL FETAL MEDICINE | Facility: CLINIC | Age: 32
End: 2022-01-18
Payer: COMMERCIAL

## 2022-01-18 DIAGNOSIS — Z34.90 PREGNANCY, UNSPECIFIED GESTATIONAL AGE: ICD-10-CM

## 2022-01-18 PROCEDURE — 76805 US MFM PROCEDURE (VIEWPOINT): ICD-10-PCS | Mod: S$GLB,,, | Performed by: OBSTETRICS & GYNECOLOGY

## 2022-01-18 PROCEDURE — 76805 OB US >/= 14 WKS SNGL FETUS: CPT | Mod: S$GLB,,, | Performed by: OBSTETRICS & GYNECOLOGY

## 2022-01-24 ENCOUNTER — PATIENT MESSAGE (OUTPATIENT)
Dept: OBSTETRICS AND GYNECOLOGY | Facility: CLINIC | Age: 32
End: 2022-01-24
Payer: COMMERCIAL

## 2022-01-24 DIAGNOSIS — O21.9 NAUSEA/VOMITING IN PREGNANCY: Primary | ICD-10-CM

## 2022-01-24 RX ORDER — ONDANSETRON 4 MG/1
4 TABLET, FILM COATED ORAL DAILY PRN
Qty: 30 TABLET | Refills: 1 | Status: SHIPPED | OUTPATIENT
Start: 2022-01-24 | End: 2022-12-27

## 2022-02-03 ENCOUNTER — PATIENT MESSAGE (OUTPATIENT)
Dept: OBSTETRICS AND GYNECOLOGY | Facility: CLINIC | Age: 32
End: 2022-02-03
Payer: COMMERCIAL

## 2022-02-21 ENCOUNTER — ROUTINE PRENATAL (OUTPATIENT)
Dept: OBSTETRICS AND GYNECOLOGY | Facility: CLINIC | Age: 32
End: 2022-02-21
Attending: OBSTETRICS & GYNECOLOGY
Payer: COMMERCIAL

## 2022-02-21 VITALS — WEIGHT: 170 LBS | SYSTOLIC BLOOD PRESSURE: 122 MMHG | BODY MASS INDEX: 28.29 KG/M2 | DIASTOLIC BLOOD PRESSURE: 68 MMHG

## 2022-02-21 DIAGNOSIS — Z3A.24 PREGNANCY WITH 24 COMPLETED WEEKS GESTATION: Primary | ICD-10-CM

## 2022-02-21 PROCEDURE — 99999 PR PBB SHADOW E&M-EST. PATIENT-LVL II: CPT | Mod: PBBFAC,,, | Performed by: OBSTETRICS & GYNECOLOGY

## 2022-02-21 PROCEDURE — 99999 PR PBB SHADOW E&M-EST. PATIENT-LVL II: ICD-10-PCS | Mod: PBBFAC,,, | Performed by: OBSTETRICS & GYNECOLOGY

## 2022-02-21 PROCEDURE — 0502F SUBSEQUENT PRENATAL CARE: CPT | Mod: CPTII,S$GLB,, | Performed by: OBSTETRICS & GYNECOLOGY

## 2022-02-21 PROCEDURE — 0502F PR SUBSEQUENT PRENATAL CARE: ICD-10-PCS | Mod: CPTII,S$GLB,, | Performed by: OBSTETRICS & GYNECOLOGY

## 2022-02-21 NOTE — PROGRESS NOTES
History of Present Illness:  Patient is a 32 y.o.  here for routine prenatal visit.  Today she has no complaints.  She reports good fetal movement, no contractions, and no vaginal bleeding.    Routine Prenatal on 2021   Component Date Value Ref Range Status    Urine Culture, Routine 2021 No growth   Final    Chlamydia, Amplified DNA 2021 Not Detected  Not Detected Final    N gonorrhoeae, amplified DNA 2021 Not Detected  Not Detected Final   Lab Visit on 2021   Component Date Value Ref Range Status    WBC 2021 12.38  3.90 - 12.70 K/uL Final    RBC 2021 4.08  4.00 - 5.40 M/uL Final    Hemoglobin 2021 12.1  12.0 - 16.0 g/dL Final    Hematocrit 2021 37.5  37.0 - 48.5 % Final    MCV 2021 92  82 - 98 fL Final    MCH 2021 29.7  27.0 - 31.0 pg Final    MCHC 2021 32.3  32.0 - 36.0 g/dL Final    RDW 2021 12.4  11.5 - 14.5 % Final    Platelets 2021 289  150 - 450 K/uL Final    MPV 2021 10.6  9.2 - 12.9 fL Final    Immature Granulocytes 2021 0.6 (A) 0.0 - 0.5 % Final    Gran # (ANC) 2021 8.3 (A) 1.8 - 7.7 K/uL Final    Immature Grans (Abs) 2021 0.07 (A) 0.00 - 0.04 K/uL Final    Lymph # 2021 3.1  1.0 - 4.8 K/uL Final    Mono # 2021 0.8  0.3 - 1.0 K/uL Final    Eos # 2021 0.1  0.0 - 0.5 K/uL Final    Baso # 2021 0.03  0.00 - 0.20 K/uL Final    nRBC 2021 0  0 /100 WBC Final    Gran % 2021 66.9  38.0 - 73.0 % Final    Lymph % 2021 24.9  18.0 - 48.0 % Final    Mono % 2021 6.7  4.0 - 15.0 % Final    Eosinophil % 2021 0.7  0.0 - 8.0 % Final    Basophil % 2021 0.2  0.0 - 1.9 % Final    Differential Method 2021 Automated   Final    Hepatitis B Surface Ag 2021 Negative  Negative Final    HIV 1/2 Ag/Ab 2021 Negative  Negative Final    RPR 2021 Non-reactive  Non-reactive Final    Rubella IgG Antibodies 2021  17.2  >=10.0 IU/mL Final    Rubella Immune Status 11/23/2021 Reactive   Final    Group & Rh 11/23/2021 A POS   Final    Indirect Joseph 11/23/2021 NEG   Final       Past Medical History:   Diagnosis Date    Anxiety        Vitals:    02/21/22 0957   BP: 122/68   Weight: 77.1 kg (169 lb 15.6 oz)     Body mass index is 28.29 kg/m².    Physical exam:  See flowchart above    Assessment and Plan:  Pregnancy with 24 completed weeks gestation  -     OB Glucose Screen; Future  -     CBC auto differential; Future        The following labs were reviewed: above.    We discussed the maternal and fetal risks and benefits of receiving the Tdap vaccination in pregnancy.  The patient will do the Tdap vaccine at 28 weeks    Follow-up in 1 month.

## 2022-02-22 ENCOUNTER — PATIENT MESSAGE (OUTPATIENT)
Dept: OBSTETRICS AND GYNECOLOGY | Facility: CLINIC | Age: 32
End: 2022-02-22
Payer: COMMERCIAL

## 2022-02-22 ENCOUNTER — TELEPHONE (OUTPATIENT)
Dept: OBSTETRICS AND GYNECOLOGY | Facility: CLINIC | Age: 32
End: 2022-02-22
Payer: COMMERCIAL

## 2022-02-22 DIAGNOSIS — O34.219 HISTORY OF CESAREAN DELIVERY, ANTEPARTUM: Primary | ICD-10-CM

## 2022-02-22 RX ORDER — ONDANSETRON 4 MG/1
4 TABLET, FILM COATED ORAL DAILY PRN
Qty: 30 TABLET | Refills: 1 | Status: SHIPPED | OUTPATIENT
Start: 2022-02-22 | End: 2022-03-07

## 2022-02-22 NOTE — TELEPHONE ENCOUNTER
Pt c/o n/v without zofran.  [facility]  Pt states she sent a message this AM.    Advised the zofran was sent to [facility].  Pt states she will get it from there.

## 2022-02-22 NOTE — TELEPHONE ENCOUNTER
C/s order set requested for 6/8/2022 at 0700    Covid ordered, pt to be scheduled 6/5/2022.  LVM for pt to schedule Covid screening.

## 2022-02-22 NOTE — TELEPHONE ENCOUNTER
----- Message from Radha Easley MD sent at 2022 10:42 AM CST -----  Can we please request a C/S for  for Jane?   She will be 39w1d. .   Thank you!

## 2022-02-23 ENCOUNTER — PATIENT MESSAGE (OUTPATIENT)
Dept: OBSTETRICS AND GYNECOLOGY | Facility: CLINIC | Age: 32
End: 2022-02-23
Payer: COMMERCIAL

## 2022-02-28 ENCOUNTER — PATIENT MESSAGE (OUTPATIENT)
Dept: ADMINISTRATIVE | Facility: OTHER | Age: 32
End: 2022-02-28
Payer: COMMERCIAL

## 2022-02-28 ENCOUNTER — TELEPHONE (OUTPATIENT)
Dept: OBSTETRICS AND GYNECOLOGY | Facility: CLINIC | Age: 32
End: 2022-02-28
Payer: COMMERCIAL

## 2022-02-28 DIAGNOSIS — O26.899 PREGNANCY RELATED NAUSEA, ANTEPARTUM: Primary | ICD-10-CM

## 2022-02-28 DIAGNOSIS — R11.0 PREGNANCY RELATED NAUSEA, ANTEPARTUM: Primary | ICD-10-CM

## 2022-02-28 RX ORDER — DOXYLAMINE SUCCINATE AND PYRIDOXINE HYDROCHLORIDE, DELAYED RELEASE TABLETS 10 MG/10 MG 10; 10 MG/1; MG/1
2 TABLET, DELAYED RELEASE ORAL NIGHTLY
Qty: 60 TABLET | Refills: 2 | Status: SHIPPED | OUTPATIENT
Start: 2022-02-28 | End: 2022-03-30

## 2022-02-28 NOTE — TELEPHONE ENCOUNTER
Contact made with pt:  - Will start Magnesium daily + prn tab for Has. Intermittent nightly headaches that resolve in AM, denies preE symotms.    - Nightly Diclegis for baseline control of nausea.

## 2022-03-02 ENCOUNTER — TELEPHONE (OUTPATIENT)
Dept: OBSTETRICS AND GYNECOLOGY | Facility: CLINIC | Age: 32
End: 2022-03-02
Payer: COMMERCIAL

## 2022-03-02 NOTE — TELEPHONE ENCOUNTER
----- Message from Esha Conde NP sent at 2/28/2022  4:03 PM CST -----  Please schedule next OB visit at 28 weeks with me. I am aware it has been scheduled with different provider.

## 2022-03-04 ENCOUNTER — LAB VISIT (OUTPATIENT)
Dept: LAB | Facility: OTHER | Age: 32
End: 2022-03-04
Attending: OBSTETRICS & GYNECOLOGY
Payer: COMMERCIAL

## 2022-03-04 DIAGNOSIS — Z3A.24 PREGNANCY WITH 24 COMPLETED WEEKS GESTATION: ICD-10-CM

## 2022-03-04 LAB
BASOPHILS # BLD AUTO: 0.03 K/UL (ref 0–0.2)
BASOPHILS NFR BLD: 0.4 % (ref 0–1.9)
DIFFERENTIAL METHOD: ABNORMAL
EOSINOPHIL # BLD AUTO: 0.1 K/UL (ref 0–0.5)
EOSINOPHIL NFR BLD: 1.1 % (ref 0–8)
ERYTHROCYTE [DISTWIDTH] IN BLOOD BY AUTOMATED COUNT: 12.9 % (ref 11.5–14.5)
GLUCOSE SERPL-MCNC: 195 MG/DL (ref 70–140)
HCT VFR BLD AUTO: 34.4 % (ref 37–48.5)
HGB BLD-MCNC: 11.1 G/DL (ref 12–16)
IMM GRANULOCYTES # BLD AUTO: 0.09 K/UL (ref 0–0.04)
IMM GRANULOCYTES NFR BLD AUTO: 1.1 % (ref 0–0.5)
LYMPHOCYTES # BLD AUTO: 1.9 K/UL (ref 1–4.8)
LYMPHOCYTES NFR BLD: 22.3 % (ref 18–48)
MCH RBC QN AUTO: 29.9 PG (ref 27–31)
MCHC RBC AUTO-ENTMCNC: 32.3 G/DL (ref 32–36)
MCV RBC AUTO: 93 FL (ref 82–98)
MONOCYTES # BLD AUTO: 0.4 K/UL (ref 0.3–1)
MONOCYTES NFR BLD: 4.9 % (ref 4–15)
NEUTROPHILS # BLD AUTO: 5.8 K/UL (ref 1.8–7.7)
NEUTROPHILS NFR BLD: 70.2 % (ref 38–73)
NRBC BLD-RTO: 0 /100 WBC
PLATELET # BLD AUTO: 226 K/UL (ref 150–450)
PMV BLD AUTO: 11.4 FL (ref 9.2–12.9)
RBC # BLD AUTO: 3.71 M/UL (ref 4–5.4)
WBC # BLD AUTO: 8.31 K/UL (ref 3.9–12.7)

## 2022-03-04 PROCEDURE — 82950 GLUCOSE TEST: CPT | Performed by: OBSTETRICS & GYNECOLOGY

## 2022-03-04 PROCEDURE — 36415 COLL VENOUS BLD VENIPUNCTURE: CPT | Performed by: OBSTETRICS & GYNECOLOGY

## 2022-03-04 PROCEDURE — 85025 COMPLETE CBC W/AUTO DIFF WBC: CPT | Performed by: OBSTETRICS & GYNECOLOGY

## 2022-03-05 DIAGNOSIS — O99.810 ABNORMAL GLUCOSE IN PREGNANCY, ANTEPARTUM: Primary | ICD-10-CM

## 2022-03-05 DIAGNOSIS — Z3A.26 26 WEEKS GESTATION OF PREGNANCY: ICD-10-CM

## 2022-03-05 NOTE — PROGRESS NOTES
26w0d without major complaints.   Was having HAs but these have resolved with magnesium.  Pt with abnormal 1 hour GCT, would prefer to monitor blood sugars x 1 week rather than do 3 hour test. Had gDM with previous pregnancy so knows how to correctly monitor and already has supplies.  Has had some problems with messaging with staff - counseled patient to just call and ask to speak directly to me from her on in.  RTC in 4 weeks for routine PNC.

## 2022-03-07 ENCOUNTER — ROUTINE PRENATAL (OUTPATIENT)
Dept: OBSTETRICS AND GYNECOLOGY | Facility: CLINIC | Age: 32
End: 2022-03-07
Payer: COMMERCIAL

## 2022-03-07 VITALS — DIASTOLIC BLOOD PRESSURE: 70 MMHG | SYSTOLIC BLOOD PRESSURE: 110 MMHG | BODY MASS INDEX: 28.38 KG/M2 | WEIGHT: 170.5 LBS

## 2022-03-07 DIAGNOSIS — O09.299 HISTORY OF GESTATIONAL DIABETES IN PRIOR PREGNANCY, CURRENTLY PREGNANT: ICD-10-CM

## 2022-03-07 DIAGNOSIS — O34.219 HISTORY OF CESAREAN DELIVERY, ANTEPARTUM: ICD-10-CM

## 2022-03-07 DIAGNOSIS — Z86.32 HISTORY OF GESTATIONAL DIABETES IN PRIOR PREGNANCY, CURRENTLY PREGNANT: ICD-10-CM

## 2022-03-07 DIAGNOSIS — Z3A.26 26 WEEKS GESTATION OF PREGNANCY: ICD-10-CM

## 2022-03-07 DIAGNOSIS — O99.810 ABNORMAL GLUCOSE IN PREGNANCY, ANTEPARTUM: Primary | ICD-10-CM

## 2022-03-07 PROCEDURE — 0502F PR SUBSEQUENT PRENATAL CARE: ICD-10-PCS | Mod: CPTII,S$GLB,, | Performed by: OBSTETRICS & GYNECOLOGY

## 2022-03-07 PROCEDURE — 99999 PR PBB SHADOW E&M-EST. PATIENT-LVL II: ICD-10-PCS | Mod: PBBFAC,,, | Performed by: OBSTETRICS & GYNECOLOGY

## 2022-03-07 PROCEDURE — 99999 PR PBB SHADOW E&M-EST. PATIENT-LVL II: CPT | Mod: PBBFAC,,, | Performed by: OBSTETRICS & GYNECOLOGY

## 2022-03-07 PROCEDURE — 0502F SUBSEQUENT PRENATAL CARE: CPT | Mod: CPTII,S$GLB,, | Performed by: OBSTETRICS & GYNECOLOGY

## 2022-03-23 ENCOUNTER — ROUTINE PRENATAL (OUTPATIENT)
Dept: OBSTETRICS AND GYNECOLOGY | Facility: CLINIC | Age: 32
End: 2022-03-23
Payer: COMMERCIAL

## 2022-03-23 VITALS — WEIGHT: 166.44 LBS | BODY MASS INDEX: 27.7 KG/M2 | SYSTOLIC BLOOD PRESSURE: 114 MMHG | DIASTOLIC BLOOD PRESSURE: 64 MMHG

## 2022-03-23 DIAGNOSIS — O24.410 DIET CONTROLLED GESTATIONAL DIABETES MELLITUS (GDM) IN THIRD TRIMESTER: ICD-10-CM

## 2022-03-23 DIAGNOSIS — Z3A.28 28 WEEKS GESTATION OF PREGNANCY: Primary | ICD-10-CM

## 2022-03-23 PROCEDURE — 99999 PR PBB SHADOW E&M-EST. PATIENT-LVL III: CPT | Mod: PBBFAC,,, | Performed by: NURSE PRACTITIONER

## 2022-03-23 PROCEDURE — 0502F SUBSEQUENT PRENATAL CARE: CPT | Mod: CPTII,S$GLB,, | Performed by: NURSE PRACTITIONER

## 2022-03-23 PROCEDURE — 99999 PR PBB SHADOW E&M-EST. PATIENT-LVL III: ICD-10-PCS | Mod: PBBFAC,,, | Performed by: NURSE PRACTITIONER

## 2022-03-23 PROCEDURE — 0502F PR SUBSEQUENT PRENATAL CARE: ICD-10-PCS | Mod: CPTII,S$GLB,, | Performed by: NURSE PRACTITIONER

## 2022-03-24 ENCOUNTER — TELEPHONE (OUTPATIENT)
Dept: OBSTETRICS AND GYNECOLOGY | Facility: CLINIC | Age: 32
End: 2022-03-24
Payer: COMMERCIAL

## 2022-03-24 NOTE — TELEPHONE ENCOUNTER
Called pt on 3/24/2022     Pt answered. Pt is furstrated- she was moved yesterday for 4/29 and states that she spent 35 minutes at check out yesterday moving that ultrasound and appt. advised pt that she can always reach out to me or ask for me and I can help her. States that she is frustrated also because the phone staff only gives pts one day- advised pt that that is what they see available and they do not double book or override appts like we are able to.    From now on- if pt needs anything she will reach out via the portal

## 2022-03-25 ENCOUNTER — PATIENT MESSAGE (OUTPATIENT)
Dept: OBSTETRICS AND GYNECOLOGY | Facility: CLINIC | Age: 32
End: 2022-03-25
Payer: COMMERCIAL

## 2022-04-01 ENCOUNTER — PATIENT MESSAGE (OUTPATIENT)
Dept: OBSTETRICS AND GYNECOLOGY | Facility: CLINIC | Age: 32
End: 2022-04-01
Payer: COMMERCIAL

## 2022-04-01 ENCOUNTER — TELEPHONE (OUTPATIENT)
Dept: OBSTETRICS AND GYNECOLOGY | Facility: CLINIC | Age: 32
End: 2022-04-01
Payer: COMMERCIAL

## 2022-04-01 DIAGNOSIS — O24.415 GESTATIONAL DIABETES MELLITUS (GDM) IN THIRD TRIMESTER CONTROLLED ON ORAL HYPOGLYCEMIC DRUG: Primary | ICD-10-CM

## 2022-04-01 RX ORDER — METFORMIN HYDROCHLORIDE 500 MG/1
500 TABLET, EXTENDED RELEASE ORAL
Qty: 30 TABLET | Refills: 4 | Status: SHIPPED | OUTPATIENT
Start: 2022-04-01 | End: 2022-04-08 | Stop reason: ALTCHOICE

## 2022-04-01 NOTE — TELEPHONE ENCOUNTER
Contact made with patient, discussed elevated postprandial BG- breakfast and lunch. Will initiate 500 mg Metformin XL once daily in the AM. All questions answered.

## 2022-04-05 ENCOUNTER — HOSPITAL ENCOUNTER (EMERGENCY)
Facility: OTHER | Age: 32
Discharge: HOME | End: 2022-04-05
Attending: OBSTETRICS & GYNECOLOGY
Payer: COMMERCIAL

## 2022-04-05 ENCOUNTER — TELEPHONE (OUTPATIENT)
Dept: OBSTETRICS AND GYNECOLOGY | Facility: CLINIC | Age: 32
End: 2022-04-05
Payer: COMMERCIAL

## 2022-04-05 VITALS
OXYGEN SATURATION: 97 % | RESPIRATION RATE: 18 BRPM | SYSTOLIC BLOOD PRESSURE: 102 MMHG | DIASTOLIC BLOOD PRESSURE: 61 MMHG | TEMPERATURE: 98 F | HEART RATE: 96 BPM

## 2022-04-05 DIAGNOSIS — Z3A.30 30 WEEKS GESTATION OF PREGNANCY: ICD-10-CM

## 2022-04-05 DIAGNOSIS — R10.2 PELVIC PRESSURE IN FEMALE: Primary | ICD-10-CM

## 2022-04-05 PROCEDURE — 59025 FETAL NON-STRESS TEST: CPT | Mod: 26,,, | Performed by: OBSTETRICS & GYNECOLOGY

## 2022-04-05 PROCEDURE — 59025 PR FETAL 2N-STRESS TEST: ICD-10-PCS | Mod: 26,,, | Performed by: OBSTETRICS & GYNECOLOGY

## 2022-04-05 PROCEDURE — 99284 EMERGENCY DEPT VISIT MOD MDM: CPT | Mod: 25

## 2022-04-05 PROCEDURE — 99283 PR EMERGENCY DEPT VISIT,LEVEL III: ICD-10-PCS | Mod: 25,,, | Performed by: OBSTETRICS & GYNECOLOGY

## 2022-04-05 PROCEDURE — 99283 EMERGENCY DEPT VISIT LOW MDM: CPT | Mod: 25,,, | Performed by: OBSTETRICS & GYNECOLOGY

## 2022-04-05 PROCEDURE — 59025 FETAL NON-STRESS TEST: CPT

## 2022-04-05 NOTE — TELEPHONE ENCOUNTER
If she's still feeling pressure, it would be reasonable for her to go to the OB ED for evaluation.

## 2022-04-05 NOTE — DISCHARGE INSTRUCTIONS
Call clinic [phone] or L & D after hours at [phone] for vaginal bleeding, leakage of fluids, contractions 4-5 in one hour, decreased fetal movements ( 10 kicks in 2 hours), headache not relieved by Tylenol, blurry vision, or temp of 100.4 or greater.  Begin doing fetal kick counts, at least 10 movements in 2 hours starting at 28 weeks gestation.  Keep next clinic appointment

## 2022-04-05 NOTE — ED PROVIDER NOTES
Encounter Date: 2022    Jane Muller is a 32 y.o. [id number] at 30w1d presents complaining of abdominal pressure since early this morning. Pt denies any pain, but reports constant pressure in her pelvis. Reports some cramping yesterday evening that went away on its own.   Denies fever, chills, N/V/D, dysuria, hematuria cough, SOB, any pain.     This IUP is complicated by gDM on metformin, repeat LTCD (h/o CSx1).  Patient denies contractions, denies vaginal bleeding, denies LOF.   Fetal Movement: normal.        History     Chief Complaint   Patient presents with    Abdominal Cramping    vaginal pressure     HPI  Review of patient's allergies indicates:  No Known Allergies  Past Medical History:   Diagnosis Date    Anxiety      Past Surgical History:   Procedure Laterality Date     SECTION N/A 10/1/2020    Procedure:  SECTION;  Surgeon: Erwin Brandt MD;  [facility];  Service: OB/GYN;  Laterality: N/A;    STOMACH SURGERY      Dr Owens - sleeve gastrectomy - was 190lbs     Family History   Problem Relation Age of Onset    Heart attack Father     Hypertension Father     No Known Problems Brother     Breast cancer Neg Hx     Colon cancer Neg Hx     Ovarian cancer Neg Hx      Social History     Tobacco Use    Smoking status: Former Smoker    Smokeless tobacco: Never Used   Substance Use Topics    Alcohol use: Yes     Comment: social    Drug use: No     Review of Systems   Constitutional: Negative for chills, fever and unexpected weight change.   HENT: Negative for sinus pressure and sore throat.    Respiratory: Negative for cough and shortness of breath.    Cardiovascular: Negative for chest pain and palpitations.   Gastrointestinal: Negative for abdominal distention, abdominal pain, constipation, diarrhea, nausea and vomiting.   Endocrine: Negative for cold intolerance and heat intolerance.   Genitourinary: Negative for difficulty urinating, dyspareunia, dysuria,  genital sores, pelvic pain, urgency and vaginal pain.        Pelvic pressure   Musculoskeletal: Negative for back pain.   Neurological: Negative for dizziness, syncope and weakness.   Hematological: Does not bruise/bleed easily.       Physical Exam     Initial Vitals [04/05/22 1145]   BP Pulse Resp Temp SpO2   102/61 96 18 98.2 °F (36.8 °C) 97 %      MAP       --         Physical Exam    Constitutional: She appears well-developed and well-nourished.   HENT:   Head: Normocephalic and atraumatic.   Eyes: Conjunctivae are normal. Pupils are equal, round, and reactive to light.   Neck:   Normal range of motion.  Cardiovascular: Normal rate.   Pulmonary/Chest: No respiratory distress.   Abdominal: She exhibits no distension. There is no abdominal tenderness.   gravid There is no rebound and no guarding.   Musculoskeletal:         General: No tenderness or edema. Normal range of motion.      Cervical back: Normal range of motion.     Neurological: She is alert and oriented to person, place, and time.   Skin: Skin is warm and dry.   Psychiatric: She has a normal mood and affect. Thought content normal.     OB LABOR EXAM:     Membranes ruptured: No.     Vaginal Bleeding: none present.     Dilatation: 0.   Station: -4.   Effacement: 40%.   Amniotic Fluid Color: no fluid.           ED Course   Obtain Fetal nonstress test (NST)    Date/Time: 4/5/2022 12:03 PM  Performed by: Ana Rosa Simmons MD  Authorized by: Ana Rosa Simmons MD     Nonstress Test:     Variability:  6-25 BPM    Decelerations:  None    Accelerations:  15 bpm    Acoustic Stimulator: No      Baseline:  140    Uterine Irritability: No      Contractions:  Not present  Biophysical Profile:     Nonstress Test Interpretation: appropriate for gestational age      Overall Impression:  Reassuring  Post-procedure:     Patient tolerance:  Patient tolerated the procedure well with no immediate complications      Labs Reviewed - No data to display       Imaging Results    None           Medications - No data to display  Medical Decision Making:   ED Management:  VSS and WNL  NST RR  TOCO with no ctx  SVE reassuring closed/thick/hi  UDIP negative  Pt counseled on pregnancy pains and round ligament pains  Pt reassured  All questions answered   labor precautions given  Pt stable for discharge home  Other:   I discussed test(s) with the performing physician.  I have discussed this case with another health care provider.            Attending Attestation:   Physician Attestation Statement for Resident:  As the supervising MD   Physician Attestation Statement: I have personally seen and examined this patient.   I agree with the above history. -:   As the supervising MD I agree with the above PE.    As the supervising MD I agree with the above treatment, course, plan, and disposition.  I was personally present during the critical portions of the procedure(s) performed by the resident and was immediately available in the ED to provide services and assistance as needed during the entire procedure.                       Ana Rosa Simmons MD  OBGYN PGY-1    Clinical Impression:   Final diagnoses:  [R10.2] Pelvic pressure in female (Primary)  [Z3A.30] 30 weeks gestation of pregnancy                 Ana Rosa Simmons MD  Resident  22 1211       Zuleyka Rodriges MD  22 1613

## 2022-04-05 NOTE — TELEPHONE ENCOUNTER
30 and 1 OB pt c/o constant severe cramping in her lower abdomen/pelvic region and tightening all last night.  Also had a lot of pressure in her pelvic region.  Did not feel like they were [place].  This went on for hours with no relief from a bath but it eventually subsided and she was able to go to sleep.  Did not take Tylenol last night.  Today, the cramps have resolved but the pressure is still there with no relief from belly band.  Pt sounds very uncomfortable.    Advised it could be the baby getting into position causing the pressure.  Will check with MD.    Please advise, thanks

## 2022-04-08 ENCOUNTER — ROUTINE PRENATAL (OUTPATIENT)
Dept: OBSTETRICS AND GYNECOLOGY | Facility: CLINIC | Age: 32
End: 2022-04-08
Payer: COMMERCIAL

## 2022-04-08 ENCOUNTER — CLINICAL SUPPORT (OUTPATIENT)
Dept: OBSTETRICS AND GYNECOLOGY | Facility: CLINIC | Age: 32
End: 2022-04-08
Payer: COMMERCIAL

## 2022-04-08 VITALS
SYSTOLIC BLOOD PRESSURE: 118 MMHG | WEIGHT: 172.31 LBS | DIASTOLIC BLOOD PRESSURE: 70 MMHG | BODY MASS INDEX: 28.67 KG/M2

## 2022-04-08 DIAGNOSIS — Z23 NEED FOR TDAP VACCINATION: ICD-10-CM

## 2022-04-08 DIAGNOSIS — Z3A.30 30 WEEKS GESTATION OF PREGNANCY: Primary | ICD-10-CM

## 2022-04-08 DIAGNOSIS — Z23 NEED FOR TDAP VACCINATION: Primary | ICD-10-CM

## 2022-04-08 DIAGNOSIS — O24.415 GESTATIONAL DIABETES MELLITUS (GDM) IN THIRD TRIMESTER CONTROLLED ON ORAL HYPOGLYCEMIC DRUG: ICD-10-CM

## 2022-04-08 PROCEDURE — 90471 TDAP VACCINE GREATER THAN OR EQUAL TO 7YO IM: ICD-10-PCS | Mod: S$GLB,,, | Performed by: NURSE PRACTITIONER

## 2022-04-08 PROCEDURE — 99999 PR PBB SHADOW E&M-EST. PATIENT-LVL I: ICD-10-PCS | Mod: PBBFAC,,,

## 2022-04-08 PROCEDURE — 99999 PR PBB SHADOW E&M-EST. PATIENT-LVL I: CPT | Mod: PBBFAC,,,

## 2022-04-08 PROCEDURE — 0502F SUBSEQUENT PRENATAL CARE: CPT | Mod: CPTII,S$GLB,, | Performed by: NURSE PRACTITIONER

## 2022-04-08 PROCEDURE — 99999 PR PBB SHADOW E&M-EST. PATIENT-LVL II: CPT | Mod: PBBFAC,,, | Performed by: NURSE PRACTITIONER

## 2022-04-08 PROCEDURE — 90471 IMMUNIZATION ADMIN: CPT | Mod: S$GLB,,, | Performed by: NURSE PRACTITIONER

## 2022-04-08 PROCEDURE — 99999 PR PBB SHADOW E&M-EST. PATIENT-LVL II: ICD-10-PCS | Mod: PBBFAC,,, | Performed by: NURSE PRACTITIONER

## 2022-04-08 PROCEDURE — 90715 TDAP VACCINE 7 YRS/> IM: CPT | Mod: S$GLB,,, | Performed by: NURSE PRACTITIONER

## 2022-04-08 PROCEDURE — 0502F PR SUBSEQUENT PRENATAL CARE: ICD-10-PCS | Mod: CPTII,S$GLB,, | Performed by: NURSE PRACTITIONER

## 2022-04-08 PROCEDURE — 90715 TDAP VACCINE GREATER THAN OR EQUAL TO 7YO IM: ICD-10-PCS | Mod: S$GLB,,, | Performed by: NURSE PRACTITIONER

## 2022-04-08 RX ORDER — METFORMIN HYDROCHLORIDE 500 MG/1
500 TABLET ORAL 2 TIMES DAILY WITH MEALS
Qty: 180 TABLET | Refills: 0 | Status: SHIPPED | OUTPATIENT
Start: 2022-04-08 | End: 2022-04-20

## 2022-04-08 NOTE — PROGRESS NOTES
Presents at 30w4d for ELIZABETH. Excellent FM, denies VB, LOF, ctx. Reports pelvic pain/pressure x 3 weeks, worsens with walking, some improvement with belly band. Recent ONEL visit. CVX: 0/40/-4. PTL precautions, plans to see private PFT for assessment.     Average AM fastin, all other BG are WNL. Will switch to 500 mg Metformin BID for improved AM readings. Tdap today. Growth scan with next visit.  FU in 2 weeks for ELIZABETH.

## 2022-04-08 NOTE — PROGRESS NOTES
.4/8/22 Pt provided Tdap VIS,  Pt administered Tdap to the right  Deltoid. Pt tolerated well. Pt instructed to wait 15 minutes in the waiting room following injection in case of reaction. Pt verbalizes understanding.     Ordering Provider:Coty Conde NP    Pain before: None    Pain after: None     Patient complaints:none

## 2022-04-18 ENCOUNTER — PATIENT MESSAGE (OUTPATIENT)
Dept: ADMINISTRATIVE | Facility: OTHER | Age: 32
End: 2022-04-18
Payer: COMMERCIAL

## 2022-04-18 ENCOUNTER — PATIENT MESSAGE (OUTPATIENT)
Dept: OBSTETRICS AND GYNECOLOGY | Facility: CLINIC | Age: 32
End: 2022-04-18
Payer: COMMERCIAL

## 2022-04-20 DIAGNOSIS — O24.419 GESTATIONAL DIABETES MELLITUS (GDM) IN THIRD TRIMESTER, GESTATIONAL DIABETES METHOD OF CONTROL UNSPECIFIED: Primary | ICD-10-CM

## 2022-04-20 RX ORDER — METFORMIN HYDROCHLORIDE 1000 MG/1
1000 TABLET ORAL 2 TIMES DAILY WITH MEALS
Qty: 60 TABLET | Refills: 4 | Status: SHIPPED | OUTPATIENT
Start: 2022-04-20 | End: 2022-06-10

## 2022-04-28 NOTE — PROGRESS NOTES
33w4d without major complaints.   PPC discussed and patient would like more time to consider - considering phexxi.   U/S for growth today shows 21%ile.   Blood glucose log reviewed and post prandial values all within recommended range. Fasting values 2/3 of the time are above target of <95.  Currently on metformin 1000 mg BID. Referral sent to [facility] for help with initiation of insulin for tighter fasting glucose control.   Weekly BPPs scheduled for remainder of pregnancy due to gDM on meds.

## 2022-04-29 ENCOUNTER — ROUTINE PRENATAL (OUTPATIENT)
Dept: OBSTETRICS AND GYNECOLOGY | Facility: CLINIC | Age: 32
End: 2022-04-29
Payer: COMMERCIAL

## 2022-04-29 ENCOUNTER — PROCEDURE VISIT (OUTPATIENT)
Dept: OBSTETRICS AND GYNECOLOGY | Facility: CLINIC | Age: 32
End: 2022-04-29
Payer: COMMERCIAL

## 2022-04-29 VITALS — SYSTOLIC BLOOD PRESSURE: 122 MMHG | DIASTOLIC BLOOD PRESSURE: 72 MMHG

## 2022-04-29 DIAGNOSIS — O34.219 HISTORY OF CESAREAN DELIVERY, ANTEPARTUM: ICD-10-CM

## 2022-04-29 DIAGNOSIS — O24.415 GESTATIONAL DIABETES MELLITUS (GDM) IN THIRD TRIMESTER CONTROLLED ON ORAL HYPOGLYCEMIC DRUG: Primary | ICD-10-CM

## 2022-04-29 DIAGNOSIS — Z3A.33 33 WEEKS GESTATION OF PREGNANCY: ICD-10-CM

## 2022-04-29 DIAGNOSIS — O24.410 DIET CONTROLLED GESTATIONAL DIABETES MELLITUS (GDM) IN THIRD TRIMESTER: ICD-10-CM

## 2022-04-29 DIAGNOSIS — Z36.4 ANTENATAL SCREENING FOR FETAL GROWTH RETARDATION USING ULTRASONICS: ICD-10-CM

## 2022-04-29 DIAGNOSIS — O24.415 GESTATIONAL DIABETES MELLITUS (GDM) IN THIRD TRIMESTER CONTROLLED ON ORAL HYPOGLYCEMIC DRUG: ICD-10-CM

## 2022-04-29 DIAGNOSIS — Z3A.28 28 WEEKS GESTATION OF PREGNANCY: ICD-10-CM

## 2022-04-29 PROCEDURE — 76816 PR  US,PREGNANT UTERUS,F/U,TRANSABD APP: ICD-10-PCS | Mod: S$GLB,,, | Performed by: OBSTETRICS & GYNECOLOGY

## 2022-04-29 PROCEDURE — 76816 OB US FOLLOW-UP PER FETUS: CPT | Mod: S$GLB,,, | Performed by: OBSTETRICS & GYNECOLOGY

## 2022-04-29 PROCEDURE — 0502F SUBSEQUENT PRENATAL CARE: CPT | Mod: CPTII,S$GLB,, | Performed by: OBSTETRICS & GYNECOLOGY

## 2022-04-29 PROCEDURE — 99999 PR PBB SHADOW E&M-EST. PATIENT-LVL III: ICD-10-PCS | Mod: PBBFAC,,, | Performed by: OBSTETRICS & GYNECOLOGY

## 2022-04-29 PROCEDURE — 0502F PR SUBSEQUENT PRENATAL CARE: ICD-10-PCS | Mod: CPTII,S$GLB,, | Performed by: OBSTETRICS & GYNECOLOGY

## 2022-04-29 PROCEDURE — 99999 PR PBB SHADOW E&M-EST. PATIENT-LVL III: CPT | Mod: PBBFAC,,, | Performed by: OBSTETRICS & GYNECOLOGY

## 2022-04-29 RX ORDER — HUMAN INSULIN 100 [IU]/ML
INJECTION, SUSPENSION SUBCUTANEOUS
Qty: 10 ML | Refills: 3 | Status: CANCELLED | OUTPATIENT
Start: 2022-04-29 | End: 2023-04-29

## 2022-05-01 ENCOUNTER — PATIENT MESSAGE (OUTPATIENT)
Dept: OBSTETRICS AND GYNECOLOGY | Facility: CLINIC | Age: 32
End: 2022-05-01
Payer: COMMERCIAL

## 2022-05-03 ENCOUNTER — TELEPHONE (OUTPATIENT)
Dept: MATERNAL FETAL MEDICINE | Facility: CLINIC | Age: 32
End: 2022-05-03
Payer: COMMERCIAL

## 2022-05-03 DIAGNOSIS — O24.415 GESTATIONAL DIABETES MELLITUS (GDM) CONTROLLED ON ORAL HYPOGLYCEMIC DRUG, ANTEPARTUM: Primary | ICD-10-CM

## 2022-05-03 NOTE — TELEPHONE ENCOUNTER
"Phone call to patient to schedule M consult. Patient expressed her "frustruation" with needing to review her chart again with another person and not having any answers." Patient states that she was told on Friday that she was "high risk and that the baby may come early," she left the appointment in tears but "then that was it nothing else until you call me today." MFM RN asked patient about current fasting blood sugars and patient states "they are  but this is not new, I have been checking my blood sugars for months."     Offered patient an appointment for consult tomorrow 5/4/22. Patient declined but states that she could only come in Thursday or Friday. Appointment scheduled for 5/6/22 at 1pm. Instructed patient to bring in BS log for review.     Patient with additional concerns. M RN reached out to Dr. aEsley to review and will call patient back with any additional information.     Pt verbalized understanding of information.        "

## 2022-05-05 ENCOUNTER — PATIENT MESSAGE (OUTPATIENT)
Dept: OBSTETRICS AND GYNECOLOGY | Facility: CLINIC | Age: 32
End: 2022-05-05
Payer: COMMERCIAL

## 2022-05-05 ENCOUNTER — TELEPHONE (OUTPATIENT)
Dept: OBSTETRICS AND GYNECOLOGY | Facility: CLINIC | Age: 32
End: 2022-05-05
Payer: COMMERCIAL

## 2022-05-05 ENCOUNTER — OFFICE VISIT (OUTPATIENT)
Dept: MATERNAL FETAL MEDICINE | Facility: CLINIC | Age: 32
End: 2022-05-05
Payer: COMMERCIAL

## 2022-05-05 ENCOUNTER — HOSPITAL ENCOUNTER (OUTPATIENT)
Dept: PERINATAL CARE | Facility: OTHER | Age: 32
Discharge: HOME | End: 2022-05-05
Attending: OBSTETRICS & GYNECOLOGY
Payer: COMMERCIAL

## 2022-05-05 ENCOUNTER — PROCEDURE VISIT (OUTPATIENT)
Dept: MATERNAL FETAL MEDICINE | Facility: CLINIC | Age: 32
End: 2022-05-05
Payer: COMMERCIAL

## 2022-05-05 VITALS
DIASTOLIC BLOOD PRESSURE: 63 MMHG | BODY MASS INDEX: 28.76 KG/M2 | SYSTOLIC BLOOD PRESSURE: 114 MMHG | WEIGHT: 172.81 LBS

## 2022-05-05 DIAGNOSIS — O24.415 GESTATIONAL DIABETES MELLITUS (GDM) CONTROLLED ON ORAL HYPOGLYCEMIC DRUG, ANTEPARTUM: ICD-10-CM

## 2022-05-05 DIAGNOSIS — O24.415 GESTATIONAL DIABETES MELLITUS (GDM) IN THIRD TRIMESTER CONTROLLED ON ORAL HYPOGLYCEMIC DRUG: Primary | ICD-10-CM

## 2022-05-05 DIAGNOSIS — O24.415 GESTATIONAL DIABETES MELLITUS (GDM) IN THIRD TRIMESTER CONTROLLED ON ORAL HYPOGLYCEMIC DRUG: ICD-10-CM

## 2022-05-05 PROCEDURE — 3078F DIAST BP <80 MM HG: CPT | Mod: CPTII,S$GLB,, | Performed by: OBSTETRICS & GYNECOLOGY

## 2022-05-05 PROCEDURE — 59025 FETAL NON-STRESS TEST: CPT

## 2022-05-05 PROCEDURE — 59025 PRENATAL TESTING - NST/AFI: ICD-10-PCS | Mod: 26,,, | Performed by: OBSTETRICS & GYNECOLOGY

## 2022-05-05 PROCEDURE — 1159F MED LIST DOCD IN RCRD: CPT | Mod: CPTII,S$GLB,, | Performed by: OBSTETRICS & GYNECOLOGY

## 2022-05-05 PROCEDURE — 99999 PR PBB SHADOW E&M-EST. PATIENT-LVL III: ICD-10-PCS | Mod: PBBFAC,,, | Performed by: OBSTETRICS & GYNECOLOGY

## 2022-05-05 PROCEDURE — 76819 US MFM PROCEDURE (VIEWPOINT): ICD-10-PCS | Mod: S$GLB,,, | Performed by: OBSTETRICS & GYNECOLOGY

## 2022-05-05 PROCEDURE — 99215 OFFICE O/P EST HI 40 MIN: CPT | Mod: 25,S$GLB,, | Performed by: OBSTETRICS & GYNECOLOGY

## 2022-05-05 PROCEDURE — 3074F PR MOST RECENT SYSTOLIC BLOOD PRESSURE < 130 MM HG: ICD-10-PCS | Mod: CPTII,S$GLB,, | Performed by: OBSTETRICS & GYNECOLOGY

## 2022-05-05 PROCEDURE — 76819 FETAL BIOPHYS PROFIL W/O NST: CPT | Mod: S$GLB,,, | Performed by: OBSTETRICS & GYNECOLOGY

## 2022-05-05 PROCEDURE — 3008F BODY MASS INDEX DOCD: CPT | Mod: CPTII,S$GLB,, | Performed by: OBSTETRICS & GYNECOLOGY

## 2022-05-05 PROCEDURE — 99215 PR OFFICE/OUTPT VISIT, EST, LEVL V, 40-54 MIN: ICD-10-PCS | Mod: 25,S$GLB,, | Performed by: OBSTETRICS & GYNECOLOGY

## 2022-05-05 PROCEDURE — 3074F SYST BP LT 130 MM HG: CPT | Mod: CPTII,S$GLB,, | Performed by: OBSTETRICS & GYNECOLOGY

## 2022-05-05 PROCEDURE — 3008F PR BODY MASS INDEX (BMI) DOCUMENTED: ICD-10-PCS | Mod: CPTII,S$GLB,, | Performed by: OBSTETRICS & GYNECOLOGY

## 2022-05-05 PROCEDURE — 99999 PR PBB SHADOW E&M-EST. PATIENT-LVL III: CPT | Mod: PBBFAC,,, | Performed by: OBSTETRICS & GYNECOLOGY

## 2022-05-05 PROCEDURE — 3078F PR MOST RECENT DIASTOLIC BLOOD PRESSURE < 80 MM HG: ICD-10-PCS | Mod: CPTII,S$GLB,, | Performed by: OBSTETRICS & GYNECOLOGY

## 2022-05-05 PROCEDURE — 1159F PR MEDICATION LIST DOCUMENTED IN MEDICAL RECORD: ICD-10-PCS | Mod: CPTII,S$GLB,, | Performed by: OBSTETRICS & GYNECOLOGY

## 2022-05-05 PROCEDURE — 59025 FETAL NON-STRESS TEST: CPT | Mod: 26,,, | Performed by: OBSTETRICS & GYNECOLOGY

## 2022-05-05 RX ORDER — DIPHENHYDRAMINE HCL 50 MG
50 CAPSULE ORAL EVERY 6 HOURS PRN
COMMUNITY
End: 2022-05-23

## 2022-05-05 RX ORDER — FERROUS SULFATE 325(65) MG
325 TABLET ORAL EVERY OTHER DAY
COMMUNITY
End: 2022-12-27

## 2022-05-05 NOTE — TELEPHONE ENCOUNTER
MATT for pt to schedule weekly bpp's. Pt does not have to see dr with all appointments. Orders are in.

## 2022-05-05 NOTE — TELEPHONE ENCOUNTER
Called patient x 2 and left voicemails x 2 after she called L&D asking to speak with me.     Pt had consult with MFM today for gDM - was told by MFM no need to start insulin.   BPP 8/10 for decreased fetal tone.

## 2022-05-05 NOTE — PROGRESS NOTES
Please see imaging tab for Viewpoint study performed today        MATERNAL-FETAL MEDICINE   CONSULT NOTE    Provider requesting consultation: Radha Easley MD     SUBJECTIVE:     Ms. Jane Muller is a 32 y.o.  female with IUP at 34w3d who is seen in consultation by MFM for evaluation and management of:  Gest DM-on metformin        Medication List with Changes/Refills   Current Medications    ESOMEPRAZOLE (NEXIUM) 20 MG CAPSULE    Take 20 mg by mouth before breakfast.    METFORMIN (GLUCOPHAGE) 1000 MG TABLET    Take 1 tablet (1,000 mg total) by mouth 2 (two) times daily with meals.    ONDANSETRON (ZOFRAN) 4 MG TABLET    Take 1 tablet (4 mg total) by mouth daily as needed for Nausea.    PRENATAL VIT NO.124/IRON/FOLIC (PRENATAL VITAMIN ORAL)    Take by mouth.   Fe supplement every other day     Review of patient's allergies indicates:  No Known Allergies    PMH:  Past Medical History:   Diagnosis Date    Anxiety        PObHx:  OB History    Para Term  AB Living   2 1 1 0 0 1   SAB IAB Ectopic Multiple Live Births   0 0 0 0 1      # Outcome Date GA Lbr Maxwell/2nd Weight Sex Delivery Anes PTL Lv   2 Current            1 Term 10/01/20 39w1d  3.26 kg (7 lb 3 oz) M CS-LTranv Spinal  TRANG      Obstetric Comments   Menarche 15   Saw Dr. Brandt during first pregnancy    PSH:  Past Surgical History:   Procedure Laterality Date     SECTION N/A 10/1/2020    Procedure:  SECTION;  Surgeon: Erwin Brandt MD;  [facility];  Service: OB/GYN;  Laterality: N/A;    STOMACH SURGERY      Dr Owens - sleeve gastrectomy - was 190lbs       Family history:family history includes Heart attack in her father; Hypertension in her father; No Known Problems in her brother.    Social history: no current alcohol, tobacco or illicit drug use.  Patient is active, caring for her toddler.      Genetic history: The patient denies any inherited genetic diseases or birth defects in herself or her partner's  personal history or family.    Objective:   /63 (BP Location: Left arm, Patient Position: Sitting)   Wt 78.4 kg (172 lb 13.5 oz)   BMI 28.76 kg/m²   Physical Exam:  Deferred     Ultrasound performed. See viewpoint for full ultrasound report.  BPP 8/   Normal MVP   Sent to PNT for NST    Significant labs/imaging:  PBS were reviewed     ASSESSMENT/PLAN:     32 y.o.  female with IUP at 34w3d     Gestational Diabetes   The risks of gestational diabetes were reviewed.  Gest DM may increase the risk for macrosomia, hypertensive disorders of pregnancy,  hypoglycemia, shoulder dystocia/birth trauma, polyhydramnios, and increased risk of  delivery (patient is planning a repeat CS).  Patients requiring medical therapy have an increased risk of stillbirth.  Discussed that  outcome is linked to her ability to achieve glycemic control.  The goal of treatment is to have a fasting blood sugar between 70 and 95 mg/dL and 2 hour postprandials less than 120 mg/dL.  Therapy has been initiated with 1000 BID of metformin and here recent PBS were reviewed and are generally excellent.   Initially after her diagnosis, the patient struggled with her FBS but those are now generally normal.  Over the past 8 days, 3 of her FBS have ranged from 95-7, the remainder are entirely normal. Approximately 30-50% of the time, women who are well-controlled on metformin may require the addition of insulin as the pregnancy progresses.  Antepartum testing is indicated for those patients requiring medical therapy to reduce the incidence of fetal demise. We discussed dietary counseling and appropriate exercise to improve peripheral insulin resistance. We discussed the frequency of blood sugar monitoring and goal blood sugars. I would recommend obtaining serial growth ultrasounds in the third trimester to monitor for macrosomia; the fetal growth US on 22 was normal.     Most women with GDM are cured by delivery.  All medications can be stopped postpartum. However, some women with GDM have undiagnosed type 2 diabetes. Therefore, I recommend obtaining a postpartum fasting blood sugar prior to discharge. Approximately 20% of women with GDM will have glucose intolerance or type 2 DM at the postpartum visit. A 2 hour glucose tolerance test should be performed 6-8 weeks postpartum. If the patient is breastfeeding, it is reasonable to delay this as appropriate. Additionally, women with GDM are at increased risk of developing type 2 DM later in life. Therefore, establishing with a primary MD who can perform annual diabetes screening is appropriate.     Recommendations:   MFM will review blood sugars in 1 week via portal; We reinforced checking 4 x/day and reinforced goal blood sugars--to send BS in on Thursday.    Regimen: Diet, metformin--continue 1000 mg BID    Recommend growth scans every 4-6 weeks, starting at 28 weeks gestation   Recommend  antepartum testing at 32 weeks gestation (weekly NST+AFV or BPP); twice weekly testing is recommended if blood sugars are poorly controlled.    Check fasting blood sugar in hospital postpartum.   If normal, discontinue therapy and monitoring and recommend repeat postpartum glucose testing in 6-8 weeks postpartum using a 75 g oral glucose tolerance test.   If fasting blood glucose is between 100-125 mg/dl or impaired glucose tolerance is noted on 2 hour glucose test, refer to primary care as appropriate.    An ultrasound for estimated fetal weight should be obtained within 3 weeks of anticipated delivery; if the EFW is >= 4500 grams, a  should be offered.    Delivery timing:  Oral agent or insulin required: 39 0/7 - 39 6/ 7 weeks gestation   Poorly controlled on oral agent or insulin: 38 0/7 - 38 6/7 weeks gestation      FOLLOW UP:   The patient's BS control is overall excellent.  Praised for her compliance with BS checks and diet.  At this time, do not feel insulin is  warranted.  If there are concerns in the near future regarding the degree of control, please reach out to MFM and nightly NPH can be added.    BPP today 6/8: patient sent to PNT for NST.  NST was reactive and entirely reassuring.  Total score: 8/10.  Patient contacted and voicemail left regarding reassuring NST.      40 minutes of total time spent on the encounter, which includes face to face time and non-face to face time preparing to see the patient (eg, review of tests), obtaining and/or reviewing separately obtained history, documenting clinical information in the electronic or other health record, independently interpreting results (not separately reported) and communicating results to the patient/family/caregiver, or care coordination (not separately reported).      Lita Logan  Maternal-Fetal Medicine    Electronically Signed by Lita Logan May 5, 2022

## 2022-05-06 ENCOUNTER — TELEPHONE (OUTPATIENT)
Dept: OBSTETRICS AND GYNECOLOGY | Facility: CLINIC | Age: 32
End: 2022-05-06
Payer: COMMERCIAL

## 2022-05-06 DIAGNOSIS — O24.419 GESTATIONAL DIABETES MELLITUS (GDM) AFFECTING PREGNANCY: Primary | ICD-10-CM

## 2022-05-06 NOTE — TELEPHONE ENCOUNTER
Called patient and discussed ultrasound findings, gestational diabetes risks.   Questions answered. Patient reassured

## 2022-05-06 NOTE — TELEPHONE ENCOUNTER
Spoke to pt.    Called pt and scheduled BPPs.  Pt is aware of going to US first and then seeing Dr. Easley after.

## 2022-05-06 NOTE — TELEPHONE ENCOUNTER
----- Message from Radha Easley MD sent at 5/6/2022  9:39 AM CDT -----  Pt needs BPPs added on to all prenatal visits and a BPP added on for next week as well - no need for MD visit at that time.   Thank you!!

## 2022-05-11 ENCOUNTER — HOSPITAL ENCOUNTER (OUTPATIENT)
Dept: PERINATAL CARE | Facility: OTHER | Age: 32
Discharge: HOME | End: 2022-05-11
Attending: OBSTETRICS & GYNECOLOGY
Payer: COMMERCIAL

## 2022-05-11 ENCOUNTER — PROCEDURE VISIT (OUTPATIENT)
Dept: OBSTETRICS AND GYNECOLOGY | Facility: CLINIC | Age: 32
End: 2022-05-11
Payer: COMMERCIAL

## 2022-05-11 DIAGNOSIS — O24.419 GESTATIONAL DIABETES MELLITUS (GDM) AFFECTING PREGNANCY: ICD-10-CM

## 2022-05-11 DIAGNOSIS — O24.419 GESTATIONAL DIABETES MELLITUS (GDM) AFFECTING PREGNANCY: Primary | ICD-10-CM

## 2022-05-11 PROCEDURE — 76819 US OB/GYN EXTENDED PROCEDURE (VIEWPOINT): ICD-10-PCS | Mod: S$GLB,,, | Performed by: OBSTETRICS & GYNECOLOGY

## 2022-05-11 PROCEDURE — 76819 FETAL BIOPHYS PROFIL W/O NST: CPT | Mod: S$GLB,,, | Performed by: OBSTETRICS & GYNECOLOGY

## 2022-05-11 PROCEDURE — 59025 FETAL NON-STRESS TEST: CPT | Mod: 26,,, | Performed by: OBSTETRICS & GYNECOLOGY

## 2022-05-11 PROCEDURE — 59025 PRENATAL TESTING - NST/AFI: ICD-10-PCS | Mod: 26,,, | Performed by: OBSTETRICS & GYNECOLOGY

## 2022-05-11 PROCEDURE — 59025 FETAL NON-STRESS TEST: CPT

## 2022-05-16 ENCOUNTER — LAB VISIT (OUTPATIENT)
Dept: LAB | Facility: HOSPITAL | Age: 32
End: 2022-05-16
Attending: OBSTETRICS & GYNECOLOGY
Payer: COMMERCIAL

## 2022-05-16 ENCOUNTER — ROUTINE PRENATAL (OUTPATIENT)
Dept: OBSTETRICS AND GYNECOLOGY | Facility: CLINIC | Age: 32
End: 2022-05-16
Payer: COMMERCIAL

## 2022-05-16 ENCOUNTER — PATIENT MESSAGE (OUTPATIENT)
Dept: OBSTETRICS AND GYNECOLOGY | Facility: CLINIC | Age: 32
End: 2022-05-16

## 2022-05-16 ENCOUNTER — PROCEDURE VISIT (OUTPATIENT)
Dept: OBSTETRICS AND GYNECOLOGY | Facility: CLINIC | Age: 32
End: 2022-05-16
Payer: COMMERCIAL

## 2022-05-16 VITALS
BODY MASS INDEX: 28.58 KG/M2 | SYSTOLIC BLOOD PRESSURE: 112 MMHG | WEIGHT: 171.75 LBS | DIASTOLIC BLOOD PRESSURE: 64 MMHG

## 2022-05-16 DIAGNOSIS — Z36.9 ANTENATAL SCREENING ENCOUNTER: ICD-10-CM

## 2022-05-16 DIAGNOSIS — O24.419 GESTATIONAL DIABETES MELLITUS (GDM) AFFECTING PREGNANCY: ICD-10-CM

## 2022-05-16 DIAGNOSIS — Z3A.36 36 WEEKS GESTATION OF PREGNANCY: ICD-10-CM

## 2022-05-16 DIAGNOSIS — O24.419 GESTATIONAL DIABETES MELLITUS (GDM) AFFECTING PREGNANCY: Primary | ICD-10-CM

## 2022-05-16 PROCEDURE — 99999 PR PBB SHADOW E&M-EST. PATIENT-LVL III: ICD-10-PCS | Mod: PBBFAC,,, | Performed by: OBSTETRICS & GYNECOLOGY

## 2022-05-16 PROCEDURE — 87389 HIV-1 AG W/HIV-1&-2 AB AG IA: CPT | Performed by: OBSTETRICS & GYNECOLOGY

## 2022-05-16 PROCEDURE — 87081 CULTURE SCREEN ONLY: CPT | Performed by: OBSTETRICS & GYNECOLOGY

## 2022-05-16 PROCEDURE — 76819 FETAL BIOPHYS PROFIL W/O NST: CPT | Mod: S$GLB,,, | Performed by: OBSTETRICS & GYNECOLOGY

## 2022-05-16 PROCEDURE — 85025 COMPLETE CBC W/AUTO DIFF WBC: CPT | Performed by: OBSTETRICS & GYNECOLOGY

## 2022-05-16 PROCEDURE — 0502F SUBSEQUENT PRENATAL CARE: CPT | Mod: CPTII,S$GLB,, | Performed by: OBSTETRICS & GYNECOLOGY

## 2022-05-16 PROCEDURE — 76819 US OB/GYN EXTENDED PROCEDURE (VIEWPOINT): ICD-10-PCS | Mod: S$GLB,,, | Performed by: OBSTETRICS & GYNECOLOGY

## 2022-05-16 PROCEDURE — 99999 PR PBB SHADOW E&M-EST. PATIENT-LVL III: CPT | Mod: PBBFAC,,, | Performed by: OBSTETRICS & GYNECOLOGY

## 2022-05-16 PROCEDURE — 0502F PR SUBSEQUENT PRENATAL CARE: ICD-10-PCS | Mod: CPTII,S$GLB,, | Performed by: OBSTETRICS & GYNECOLOGY

## 2022-05-16 PROCEDURE — 86592 SYPHILIS TEST NON-TREP QUAL: CPT | Performed by: OBSTETRICS & GYNECOLOGY

## 2022-05-16 NOTE — PROGRESS NOTES
36w0d with lots of pelvic pressure.   BPP 8/8.   R/B/A of vaginal, operative, and  delivery as well as transfusion of blood products discussed today. All questions answered and patient voices understanding. Consents signed.   GBS, HIV, RPR, CBC collected.   Labor precautions discussed.   RTC in 1 weeks for routine PNC.

## 2022-05-17 ENCOUNTER — PATIENT MESSAGE (OUTPATIENT)
Dept: OBSTETRICS AND GYNECOLOGY | Facility: CLINIC | Age: 32
End: 2022-05-17
Payer: COMMERCIAL

## 2022-05-17 LAB
BASOPHILS # BLD AUTO: 0.02 K/UL (ref 0–0.2)
BASOPHILS NFR BLD: 0.2 % (ref 0–1.9)
DIFFERENTIAL METHOD: ABNORMAL
EOSINOPHIL # BLD AUTO: 0 K/UL (ref 0–0.5)
EOSINOPHIL NFR BLD: 0.3 % (ref 0–8)
ERYTHROCYTE [DISTWIDTH] IN BLOOD BY AUTOMATED COUNT: 13.8 % (ref 11.5–14.5)
HCT VFR BLD AUTO: 32.4 % (ref 37–48.5)
HGB BLD-MCNC: 9.7 G/DL (ref 12–16)
IMM GRANULOCYTES # BLD AUTO: 0.06 K/UL (ref 0–0.04)
IMM GRANULOCYTES NFR BLD AUTO: 0.6 % (ref 0–0.5)
LYMPHOCYTES # BLD AUTO: 1.7 K/UL (ref 1–4.8)
LYMPHOCYTES NFR BLD: 17.9 % (ref 18–48)
MCH RBC QN AUTO: 26.3 PG (ref 27–31)
MCHC RBC AUTO-ENTMCNC: 29.9 G/DL (ref 32–36)
MCV RBC AUTO: 88 FL (ref 82–98)
MONOCYTES # BLD AUTO: 0.9 K/UL (ref 0.3–1)
MONOCYTES NFR BLD: 9.1 % (ref 4–15)
NEUTROPHILS # BLD AUTO: 6.9 K/UL (ref 1.8–7.7)
NEUTROPHILS NFR BLD: 71.9 % (ref 38–73)
NRBC BLD-RTO: 0 /100 WBC
PLATELET # BLD AUTO: 274 K/UL (ref 150–450)
PMV BLD AUTO: 12.3 FL (ref 9.2–12.9)
RBC # BLD AUTO: 3.69 M/UL (ref 4–5.4)
RPR SER QL: NORMAL
WBC # BLD AUTO: 9.66 K/UL (ref 3.9–12.7)

## 2022-05-19 LAB
BACTERIA SPEC AEROBE CULT: NORMAL
HIV 1+2 AB+HIV1 P24 AG SERPL QL IA: NEGATIVE

## 2022-05-23 ENCOUNTER — ROUTINE PRENATAL (OUTPATIENT)
Dept: OBSTETRICS AND GYNECOLOGY | Facility: CLINIC | Age: 32
End: 2022-05-23
Payer: COMMERCIAL

## 2022-05-23 ENCOUNTER — PROCEDURE VISIT (OUTPATIENT)
Dept: OBSTETRICS AND GYNECOLOGY | Facility: CLINIC | Age: 32
End: 2022-05-23
Payer: COMMERCIAL

## 2022-05-23 VITALS
WEIGHT: 172.75 LBS | DIASTOLIC BLOOD PRESSURE: 68 MMHG | SYSTOLIC BLOOD PRESSURE: 126 MMHG | BODY MASS INDEX: 28.74 KG/M2

## 2022-05-23 DIAGNOSIS — O24.415 GESTATIONAL DIABETES MELLITUS (GDM) IN THIRD TRIMESTER CONTROLLED ON ORAL HYPOGLYCEMIC DRUG: Primary | ICD-10-CM

## 2022-05-23 DIAGNOSIS — Z3A.37 37 WEEKS GESTATION OF PREGNANCY: ICD-10-CM

## 2022-05-23 DIAGNOSIS — O24.419 GESTATIONAL DIABETES MELLITUS (GDM) AFFECTING PREGNANCY: ICD-10-CM

## 2022-05-23 PROCEDURE — 76816 US OB/GYN EXTENDED PROCEDURE (VIEWPOINT): ICD-10-PCS | Mod: S$GLB,,, | Performed by: OBSTETRICS & GYNECOLOGY

## 2022-05-23 PROCEDURE — 76819 FETAL BIOPHYS PROFIL W/O NST: CPT | Mod: S$GLB,,, | Performed by: OBSTETRICS & GYNECOLOGY

## 2022-05-23 PROCEDURE — 0502F SUBSEQUENT PRENATAL CARE: CPT | Mod: CPTII,S$GLB,, | Performed by: OBSTETRICS & GYNECOLOGY

## 2022-05-23 PROCEDURE — 99999 PR PBB SHADOW E&M-EST. PATIENT-LVL II: ICD-10-PCS | Mod: PBBFAC,,, | Performed by: OBSTETRICS & GYNECOLOGY

## 2022-05-23 PROCEDURE — 76816 OB US FOLLOW-UP PER FETUS: CPT | Mod: S$GLB,,, | Performed by: OBSTETRICS & GYNECOLOGY

## 2022-05-23 PROCEDURE — 0502F PR SUBSEQUENT PRENATAL CARE: ICD-10-PCS | Mod: CPTII,S$GLB,, | Performed by: OBSTETRICS & GYNECOLOGY

## 2022-05-23 PROCEDURE — 76819 US OB/GYN EXTENDED PROCEDURE (VIEWPOINT): ICD-10-PCS | Mod: S$GLB,,, | Performed by: OBSTETRICS & GYNECOLOGY

## 2022-05-23 PROCEDURE — 99999 PR PBB SHADOW E&M-EST. PATIENT-LVL II: CPT | Mod: PBBFAC,,, | Performed by: OBSTETRICS & GYNECOLOGY

## 2022-05-23 NOTE — PROGRESS NOTES
37w0d without major complaints.   Pt anxious over gDM and worried about baby's well being.   Really would like delivery as soon as possible.   Blood sugars show 3 pp values outside of target range over the past week (not really eating breakfast, when she does those are the values that are typically high).   BPP 8/8 today.   C/S currently scheduled for 6/8 at 39w2d.

## 2022-05-30 ENCOUNTER — ROUTINE PRENATAL (OUTPATIENT)
Dept: OBSTETRICS AND GYNECOLOGY | Facility: CLINIC | Age: 32
End: 2022-05-30
Payer: COMMERCIAL

## 2022-05-30 ENCOUNTER — PROCEDURE VISIT (OUTPATIENT)
Dept: OBSTETRICS AND GYNECOLOGY | Facility: CLINIC | Age: 32
End: 2022-05-30
Payer: COMMERCIAL

## 2022-05-30 VITALS
WEIGHT: 173.31 LBS | SYSTOLIC BLOOD PRESSURE: 108 MMHG | DIASTOLIC BLOOD PRESSURE: 70 MMHG | BODY MASS INDEX: 28.84 KG/M2

## 2022-05-30 DIAGNOSIS — O34.219 HISTORY OF CESAREAN DELIVERY, ANTEPARTUM: ICD-10-CM

## 2022-05-30 DIAGNOSIS — O24.419 GESTATIONAL DIABETES MELLITUS (GDM) AFFECTING PREGNANCY: Primary | ICD-10-CM

## 2022-05-30 DIAGNOSIS — O24.419 GESTATIONAL DIABETES MELLITUS (GDM) AFFECTING PREGNANCY: ICD-10-CM

## 2022-05-30 DIAGNOSIS — Z3A.38 38 WEEKS GESTATION OF PREGNANCY: ICD-10-CM

## 2022-05-30 PROCEDURE — 99999 PR PBB SHADOW E&M-EST. PATIENT-LVL III: CPT | Mod: PBBFAC,,, | Performed by: OBSTETRICS & GYNECOLOGY

## 2022-05-30 PROCEDURE — 0502F PR SUBSEQUENT PRENATAL CARE: ICD-10-PCS | Mod: CPTII,S$GLB,, | Performed by: OBSTETRICS & GYNECOLOGY

## 2022-05-30 PROCEDURE — 76819 US OB/GYN EXTENDED PROCEDURE (VIEWPOINT): ICD-10-PCS | Mod: S$GLB,,, | Performed by: OBSTETRICS & GYNECOLOGY

## 2022-05-30 PROCEDURE — 99999 PR PBB SHADOW E&M-EST. PATIENT-LVL III: ICD-10-PCS | Mod: PBBFAC,,, | Performed by: OBSTETRICS & GYNECOLOGY

## 2022-05-30 PROCEDURE — 0502F SUBSEQUENT PRENATAL CARE: CPT | Mod: CPTII,S$GLB,, | Performed by: OBSTETRICS & GYNECOLOGY

## 2022-05-30 PROCEDURE — 76819 FETAL BIOPHYS PROFIL W/O NST: CPT | Mod: S$GLB,,, | Performed by: OBSTETRICS & GYNECOLOGY

## 2022-06-01 ENCOUNTER — PATIENT MESSAGE (OUTPATIENT)
Dept: OBSTETRICS AND GYNECOLOGY | Facility: CLINIC | Age: 32
End: 2022-06-01
Payer: COMMERCIAL

## 2022-06-01 NOTE — PROGRESS NOTES
38w0d without major complaints.   Blood sugars remain well controlled.  BPP 8/8.   C/S handout given.   RTC in 1 week for BPP, plan for C/S next week.

## 2022-06-03 ENCOUNTER — ROUTINE PRENATAL (OUTPATIENT)
Dept: OBSTETRICS AND GYNECOLOGY | Facility: CLINIC | Age: 32
End: 2022-06-03
Payer: COMMERCIAL

## 2022-06-03 ENCOUNTER — PATIENT MESSAGE (OUTPATIENT)
Dept: OBSTETRICS AND GYNECOLOGY | Facility: CLINIC | Age: 32
End: 2022-06-03

## 2022-06-03 ENCOUNTER — PROCEDURE VISIT (OUTPATIENT)
Dept: OBSTETRICS AND GYNECOLOGY | Facility: CLINIC | Age: 32
End: 2022-06-03
Payer: COMMERCIAL

## 2022-06-03 VITALS
WEIGHT: 175.81 LBS | DIASTOLIC BLOOD PRESSURE: 62 MMHG | BODY MASS INDEX: 29.26 KG/M2 | SYSTOLIC BLOOD PRESSURE: 110 MMHG

## 2022-06-03 DIAGNOSIS — O24.415 GESTATIONAL DIABETES MELLITUS (GDM) IN THIRD TRIMESTER CONTROLLED ON ORAL HYPOGLYCEMIC DRUG: ICD-10-CM

## 2022-06-03 DIAGNOSIS — Z3A.38 38 WEEKS GESTATION OF PREGNANCY: Primary | ICD-10-CM

## 2022-06-03 PROCEDURE — 76819 US OB/GYN EXTENDED PROCEDURE (VIEWPOINT): ICD-10-PCS | Mod: S$GLB,,, | Performed by: OBSTETRICS & GYNECOLOGY

## 2022-06-03 PROCEDURE — 76819 FETAL BIOPHYS PROFIL W/O NST: CPT | Mod: S$GLB,,, | Performed by: OBSTETRICS & GYNECOLOGY

## 2022-06-03 PROCEDURE — 0502F PR SUBSEQUENT PRENATAL CARE: ICD-10-PCS | Mod: CPTII,S$GLB,, | Performed by: NURSE PRACTITIONER

## 2022-06-03 PROCEDURE — 99999 PR PBB SHADOW E&M-EST. PATIENT-LVL II: ICD-10-PCS | Mod: PBBFAC,,, | Performed by: NURSE PRACTITIONER

## 2022-06-03 PROCEDURE — 99999 PR PBB SHADOW E&M-EST. PATIENT-LVL II: CPT | Mod: PBBFAC,,, | Performed by: NURSE PRACTITIONER

## 2022-06-03 PROCEDURE — 0502F SUBSEQUENT PRENATAL CARE: CPT | Mod: CPTII,S$GLB,, | Performed by: NURSE PRACTITIONER

## 2022-06-03 NOTE — PROGRESS NOTES
Presents at 38w4d for ELIZABETH. Very anxious, concern for need for delivery with gDM. Discussed normal growth, BPP is WNL, and BG is well controlled with oral medication. Reassurance regarding planned scheduled rLTCS on 6/8/22. Strict labor/FKC precautions. Planned visit + NST on 6/6/22. FU on 6/6/22 for ELIZABETH.

## 2022-06-03 NOTE — TELEPHONE ENCOUNTER
"Called pt     Pt answered. Discussed with pt her concerns and apologized that she felt that way. Pt states that she was told by the high risk MD that she could have a stillbirth and that is why she is very nervous and worried. States that Dr. Easley wanted to have c/s at 38 weeks but was denied by high risk doctor. States that she is having intense pressure to the point where she has been crying x2 days.     Stated that I will see if Dr. Quintero will chat with her and answer other question and give her other advice. Pt declined stating that she has talked with Dr. Easley already. States that she just does not feel heard and feels like a number in the system. Apologized that she felt that way.     Offered pt ultrasound today at 10:00am per Pérez approval. Pt accepted.     Pt will still come Monday but at 1:45pm and have a NST test.     Pt stated "why are we doing all of these preventative tests to make sure the baby stays alive instead of just taking the baby out". Advised pt that one of the providers would be better to answer that question.     Pt verbalized understanding and will come to u/s today. Spoke with Esha and she would like to check her. Will advise pt   "

## 2022-06-04 ENCOUNTER — PATIENT MESSAGE (OUTPATIENT)
Dept: OBSTETRICS AND GYNECOLOGY | Facility: CLINIC | Age: 32
End: 2022-06-04
Payer: COMMERCIAL

## 2022-06-06 ENCOUNTER — ROUTINE PRENATAL (OUTPATIENT)
Dept: OBSTETRICS AND GYNECOLOGY | Facility: CLINIC | Age: 32
End: 2022-06-06
Payer: COMMERCIAL

## 2022-06-06 VITALS
WEIGHT: 173.31 LBS | SYSTOLIC BLOOD PRESSURE: 114 MMHG | DIASTOLIC BLOOD PRESSURE: 68 MMHG | BODY MASS INDEX: 28.84 KG/M2

## 2022-06-06 DIAGNOSIS — O34.219 HISTORY OF CESAREAN DELIVERY, ANTEPARTUM: ICD-10-CM

## 2022-06-06 DIAGNOSIS — Z3A.39 39 WEEKS GESTATION OF PREGNANCY: ICD-10-CM

## 2022-06-06 DIAGNOSIS — O24.415 GESTATIONAL DIABETES MELLITUS (GDM) IN THIRD TRIMESTER CONTROLLED ON ORAL HYPOGLYCEMIC DRUG: Primary | ICD-10-CM

## 2022-06-06 PROCEDURE — 0502F PR SUBSEQUENT PRENATAL CARE: ICD-10-PCS | Mod: CPTII,S$GLB,, | Performed by: OBSTETRICS & GYNECOLOGY

## 2022-06-06 PROCEDURE — 0502F SUBSEQUENT PRENATAL CARE: CPT | Mod: CPTII,S$GLB,, | Performed by: OBSTETRICS & GYNECOLOGY

## 2022-06-06 PROCEDURE — 99999 PR PBB SHADOW E&M-EST. PATIENT-LVL III: CPT | Mod: PBBFAC,,, | Performed by: OBSTETRICS & GYNECOLOGY

## 2022-06-06 PROCEDURE — 99999 PR PBB SHADOW E&M-EST. PATIENT-LVL III: ICD-10-PCS | Mod: PBBFAC,,, | Performed by: OBSTETRICS & GYNECOLOGY

## 2022-06-06 NOTE — PROGRESS NOTES
39w0d without major complaints.   NST:   140 bpm, mod variability, accels present, no decels.   C/S on Wednesday, hibiclense given today.   RTC in 2 weeks and 6 weeks for routine post op and post partum follow up.

## 2022-06-07 NOTE — H&P
HISTORY AND PHYSICAL                                                OBSTETRICS          Subjective:      Jane Muller is a 32 y.o.  female with IUP at 39w2d gestation who presents to L&D for repeat  section. Pertinent medical history for this pregnancy includes gDM which has been controlled with metformin and iron deficiency anemia.  She denies contractions, vaginal bleeding and leakage of fluid.  Reports normal fetal movement. Care this pregnancy has been with Dr. Easley.    PMHx:   Past Medical History:   Diagnosis Date    Anxiety        PSHx:   Past Surgical History:   Procedure Laterality Date     SECTION N/A 10/1/2020    Procedure:  SECTION;  Surgeon: Erwin Brandt MD;  Location: [facility] L&D;  Service: OB/GYN;  Laterality: N/A;    STOMACH SURGERY  2017    Dr Owens - sleeve gastrectomy - was 190lbs       All: Review of patient's allergies indicates:  No Known Allergies    Meds:   No medications prior to admission.       SH:   Social History     Socioeconomic History    Marital status:    Tobacco Use    Smoking status: Former Smoker    Smokeless tobacco: Never Used   Substance and Sexual Activity    Alcohol use: Yes     Comment: social    Drug use: No    Sexual activity: Yes     Partners: Male     Birth control/protection: None       FH:   Family History   Problem Relation Age of Onset    Heart attack Father     Hypertension Father     No Known Problems Brother     Breast cancer Neg Hx     Colon cancer Neg Hx     Ovarian cancer Neg Hx        OBHx:   OB History    Para Term  AB Living   2 1 1 0 0 1   SAB IAB Ectopic Multiple Live Births   0 0 0 0 1      # Outcome Date GA Lbr Maxwell/2nd Weight Sex Delivery Anes PTL Lv   2 Current            1 Term 10/01/20 39w1d  3.26 kg (7 lb 3 oz) M CS-LTranv Spinal  TRANG      Name: Shawn Carmen)       Apgar1: 9  Apgar5: 9      Obstetric Comments   Menarche 15       Objective:      BP  Readings from Last 3 Encounters:   22 114/68   22 110/62   22 108/70       BMI Readings from Last 1 Encounters:   22 28.84 kg/m²       General:   alert and cooperative   HEENT:  normocephalic, atraumatic   Lungs:   Normal work of breathing   Heart:   Normal cap refill   Abdomen:  gravid, non-tender   Extremities non-tender, no edema   Derm: no rashes or lesions   Psych: appropriate mood and affect   Pelvis:  deferred         Lab Review  Lab Results   Component Value Date    GROUPTRH A POS 2021    HGB 9.7 (L) 2022    HCT 32.4 (L) 2022     2022    RUBELLAIMMUN Reactive 2021    HEPBSAG Negative 2021    VXF32PRPY Negative 2022    RPR Non-reactive 2022    LABCHLA Not Detected 2021    LABNGO Not Detected 2021    LABURIN No growth 2021    OBGLUCOSESCR 195 (H) 2022    STREPBCULT No Group B Streptococcus isolated 2022    [id number] Not Detected 2020         Assessment:     32 y.o.  at 39w2d gestation here for  delivery 2/2 h/o  delivery declining TOLAC  2. gDM on Metformin, 1000 mg BID  3. Iron deficiency anemia    Plan:     1. Risks, benefits, alternatives and possible complications of  delivery and transfusion have been discussed in detail with the patient. All questions have been answered, and Ms. Muller has voiced understanding and agrees to the treatment plan.  2. Consents signed and in chart  3. Admit to Labor and Delivery unit for repeat  delivery

## 2022-06-08 ENCOUNTER — ANESTHESIA (OUTPATIENT)
Dept: OBSTETRICS AND GYNECOLOGY | Facility: OTHER | Age: 32
End: 2022-06-08
Payer: COMMERCIAL

## 2022-06-08 ENCOUNTER — ANESTHESIA EVENT (OUTPATIENT)
Dept: OBSTETRICS AND GYNECOLOGY | Facility: OTHER | Age: 32
End: 2022-06-08
Payer: COMMERCIAL

## 2022-06-08 ENCOUNTER — HOSPITAL ENCOUNTER (INPATIENT)
Facility: OTHER | Age: 32
LOS: 2 days | Discharge: HOME | End: 2022-06-10
Attending: OBSTETRICS & GYNECOLOGY | Admitting: OBSTETRICS & GYNECOLOGY
Payer: COMMERCIAL

## 2022-06-08 DIAGNOSIS — Z98.891 HISTORY OF C-SECTION: ICD-10-CM

## 2022-06-08 LAB
ABO + RH BLD: NORMAL
BASOPHILS # BLD AUTO: 0.03 K/UL (ref 0–0.2)
BASOPHILS NFR BLD: 0.3 % (ref 0–1.9)
BLD GP AB SCN CELLS X3 SERPL QL: NORMAL
DIFFERENTIAL METHOD: ABNORMAL
EOSINOPHIL # BLD AUTO: 0.1 K/UL (ref 0–0.5)
EOSINOPHIL NFR BLD: 1 % (ref 0–8)
ERYTHROCYTE [DISTWIDTH] IN BLOOD BY AUTOMATED COUNT: 14.6 % (ref 11.5–14.5)
HCT VFR BLD AUTO: 33.2 % (ref 37–48.5)
HGB BLD-MCNC: 10.4 G/DL (ref 12–16)
IMM GRANULOCYTES # BLD AUTO: 0.09 K/UL (ref 0–0.04)
IMM GRANULOCYTES NFR BLD AUTO: 0.9 % (ref 0–0.5)
LYMPHOCYTES # BLD AUTO: 3.3 K/UL (ref 1–4.8)
LYMPHOCYTES NFR BLD: 31.6 % (ref 18–48)
MCH RBC QN AUTO: 26.5 PG (ref 27–31)
MCHC RBC AUTO-ENTMCNC: 31.3 G/DL (ref 32–36)
MCV RBC AUTO: 85 FL (ref 82–98)
MONOCYTES # BLD AUTO: 0.8 K/UL (ref 0.3–1)
MONOCYTES NFR BLD: 8 % (ref 4–15)
NEUTROPHILS # BLD AUTO: 6 K/UL (ref 1.8–7.7)
NEUTROPHILS NFR BLD: 58.2 % (ref 38–73)
NRBC BLD-RTO: 0 /100 WBC
PLATELET # BLD AUTO: 237 K/UL (ref 150–450)
PMV BLD AUTO: 12.2 FL (ref 9.2–12.9)
POCT GLUCOSE: 83 MG/DL (ref 70–110)
RBC # BLD AUTO: 3.93 M/UL (ref 4–5.4)
WBC # BLD AUTO: 10.3 K/UL (ref 3.9–12.7)

## 2022-06-08 PROCEDURE — 71000039 HC RECOVERY, EACH ADD'L HOUR: Performed by: OBSTETRICS & GYNECOLOGY

## 2022-06-08 PROCEDURE — 86901 BLOOD TYPING SEROLOGIC RH(D): CPT | Performed by: OBSTETRICS & GYNECOLOGY

## 2022-06-08 PROCEDURE — 11000001 HC ACUTE MED/SURG PRIVATE ROOM

## 2022-06-08 PROCEDURE — 59510 PR FULL ROUT OBSTE CARE,CESAREAN DELIV: ICD-10-PCS | Mod: GB,,, | Performed by: OBSTETRICS & GYNECOLOGY

## 2022-06-08 PROCEDURE — 51701 INSERT BLADDER CATHETER: CPT

## 2022-06-08 PROCEDURE — 59514 CESAREAN DELIVERY ONLY: CPT | Mod: QY,,, | Performed by: ANESTHESIOLOGY

## 2022-06-08 PROCEDURE — 86900 BLOOD TYPING SEROLOGIC ABO: CPT | Performed by: OBSTETRICS & GYNECOLOGY

## 2022-06-08 PROCEDURE — 63600175 PHARM REV CODE 636 W HCPCS

## 2022-06-08 PROCEDURE — 59514 PRA REAN DELIVERY ONLY: ICD-10-PCS | Mod: QY,,, | Performed by: ANESTHESIOLOGY

## 2022-06-08 PROCEDURE — 37000009 HC ANESTHESIA EA ADD 15 MINS: Performed by: OBSTETRICS & GYNECOLOGY

## 2022-06-08 PROCEDURE — 25000003 PHARM REV CODE 250

## 2022-06-08 PROCEDURE — 36004724 HC OB OR TIME LEV III - 1ST 15 MIN: Performed by: OBSTETRICS & GYNECOLOGY

## 2022-06-08 PROCEDURE — 37000008 HC ANESTHESIA 1ST 15 MINUTES: Performed by: OBSTETRICS & GYNECOLOGY

## 2022-06-08 PROCEDURE — 71000033 HC RECOVERY, INTIAL HOUR: Performed by: OBSTETRICS & GYNECOLOGY

## 2022-06-08 PROCEDURE — 63600175 PHARM REV CODE 636 W HCPCS: Performed by: STUDENT IN AN ORGANIZED HEALTH CARE EDUCATION/TRAINING PROGRAM

## 2022-06-08 PROCEDURE — 36004725 HC OB OR TIME LEV III - EA ADD 15 MIN: Performed by: OBSTETRICS & GYNECOLOGY

## 2022-06-08 PROCEDURE — 25000003 PHARM REV CODE 250: Performed by: STUDENT IN AN ORGANIZED HEALTH CARE EDUCATION/TRAINING PROGRAM

## 2022-06-08 PROCEDURE — 59510 CESAREAN DELIVERY: CPT | Mod: GB,,, | Performed by: OBSTETRICS & GYNECOLOGY

## 2022-06-08 PROCEDURE — 85025 COMPLETE CBC W/AUTO DIFF WBC: CPT | Performed by: OBSTETRICS & GYNECOLOGY

## 2022-06-08 RX ORDER — DEXAMETHASONE SODIUM PHOSPHATE 4 MG/ML
INJECTION, SOLUTION INTRA-ARTICULAR; INTRALESIONAL; INTRAMUSCULAR; INTRAVENOUS; SOFT TISSUE
Status: DISCONTINUED | OUTPATIENT
Start: 2022-06-08 | End: 2022-06-08

## 2022-06-08 RX ORDER — DOCUSATE SODIUM 100 MG/1
200 CAPSULE, LIQUID FILLED ORAL 2 TIMES DAILY
Status: DISCONTINUED | OUTPATIENT
Start: 2022-06-08 | End: 2022-06-10 | Stop reason: HOSPADM

## 2022-06-08 RX ORDER — ONDANSETRON 2 MG/ML
INJECTION INTRAMUSCULAR; INTRAVENOUS
Status: DISCONTINUED | OUTPATIENT
Start: 2022-06-08 | End: 2022-06-08

## 2022-06-08 RX ORDER — MORPHINE SULFATE 0.5 MG/ML
INJECTION, SOLUTION EPIDURAL; INTRATHECAL; INTRAVENOUS
Status: DISCONTINUED | OUTPATIENT
Start: 2022-06-08 | End: 2022-06-08

## 2022-06-08 RX ORDER — SODIUM CHLORIDE, SODIUM LACTATE, POTASSIUM CHLORIDE, CALCIUM CHLORIDE 600; 310; 30; 20 MG/100ML; MG/100ML; MG/100ML; MG/100ML
INJECTION, SOLUTION INTRAVENOUS CONTINUOUS
Status: DISCONTINUED | OUTPATIENT
Start: 2022-06-08 | End: 2022-06-10 | Stop reason: HOSPADM

## 2022-06-08 RX ORDER — AMOXICILLIN 250 MG
1 CAPSULE ORAL NIGHTLY PRN
Status: DISCONTINUED | OUTPATIENT
Start: 2022-06-08 | End: 2022-06-10 | Stop reason: HOSPADM

## 2022-06-08 RX ORDER — NALBUPHINE HYDROCHLORIDE 10 MG/ML
5 INJECTION, SOLUTION INTRAMUSCULAR; INTRAVENOUS; SUBCUTANEOUS ONCE AS NEEDED
Status: DISCONTINUED | OUTPATIENT
Start: 2022-06-08 | End: 2022-06-10 | Stop reason: HOSPADM

## 2022-06-08 RX ORDER — OXYCODONE HYDROCHLORIDE 5 MG/1
10 TABLET ORAL EVERY 4 HOURS PRN
Status: DISCONTINUED | OUTPATIENT
Start: 2022-06-08 | End: 2022-06-10 | Stop reason: HOSPADM

## 2022-06-08 RX ORDER — DIPHENHYDRAMINE HCL 25 MG
25 CAPSULE ORAL EVERY 6 HOURS PRN
Status: DISCONTINUED | OUTPATIENT
Start: 2022-06-08 | End: 2022-06-10 | Stop reason: HOSPADM

## 2022-06-08 RX ORDER — BUPIVACAINE HYDROCHLORIDE 7.5 MG/ML
INJECTION, SOLUTION INTRASPINAL
Status: DISCONTINUED | OUTPATIENT
Start: 2022-06-08 | End: 2022-06-08

## 2022-06-08 RX ORDER — CEFAZOLIN SODIUM 1 G/3ML
INJECTION, POWDER, FOR SOLUTION INTRAMUSCULAR; INTRAVENOUS
Status: DISCONTINUED | OUTPATIENT
Start: 2022-06-08 | End: 2022-06-08

## 2022-06-08 RX ORDER — SODIUM CHLORIDE, SODIUM LACTATE, POTASSIUM CHLORIDE, CALCIUM CHLORIDE 600; 310; 30; 20 MG/100ML; MG/100ML; MG/100ML; MG/100ML
INJECTION, SOLUTION INTRAVENOUS CONTINUOUS PRN
Status: DISCONTINUED | OUTPATIENT
Start: 2022-06-08 | End: 2022-06-08

## 2022-06-08 RX ORDER — ACETAMINOPHEN 325 MG/1
650 TABLET ORAL
Status: DISCONTINUED | OUTPATIENT
Start: 2022-06-08 | End: 2022-06-10 | Stop reason: HOSPADM

## 2022-06-08 RX ORDER — OXYTOCIN/RINGER'S LACTATE 30/500 ML
95 PLASTIC BAG, INJECTION (ML) INTRAVENOUS CONTINUOUS
Status: DISCONTINUED | OUTPATIENT
Start: 2022-06-08 | End: 2022-06-10 | Stop reason: HOSPADM

## 2022-06-08 RX ORDER — CEFAZOLIN SODIUM 2 G/50ML
2 SOLUTION INTRAVENOUS
Status: DISCONTINUED | OUTPATIENT
Start: 2022-06-08 | End: 2022-06-10 | Stop reason: HOSPADM

## 2022-06-08 RX ORDER — ACETAMINOPHEN 500 MG
1000 TABLET ORAL
Status: COMPLETED | OUTPATIENT
Start: 2022-06-08 | End: 2022-06-08

## 2022-06-08 RX ORDER — OXYCODONE HYDROCHLORIDE 5 MG/1
5 TABLET ORAL EVERY 4 HOURS PRN
Status: DISCONTINUED | OUTPATIENT
Start: 2022-06-08 | End: 2022-06-10 | Stop reason: HOSPADM

## 2022-06-08 RX ORDER — KETOROLAC TROMETHAMINE 30 MG/ML
30 INJECTION, SOLUTION INTRAMUSCULAR; INTRAVENOUS
Status: COMPLETED | OUTPATIENT
Start: 2022-06-08 | End: 2022-06-09

## 2022-06-08 RX ORDER — CARBOPROST TROMETHAMINE 250 UG/ML
250 INJECTION, SOLUTION INTRAMUSCULAR
Status: DISCONTINUED | OUTPATIENT
Start: 2022-06-08 | End: 2022-06-10 | Stop reason: HOSPADM

## 2022-06-08 RX ORDER — HYDROCORTISONE 25 MG/G
CREAM TOPICAL 3 TIMES DAILY PRN
Status: DISCONTINUED | OUTPATIENT
Start: 2022-06-08 | End: 2022-06-10 | Stop reason: HOSPADM

## 2022-06-08 RX ORDER — ONDANSETRON 8 MG/1
8 TABLET, ORALLY DISINTEGRATING ORAL EVERY 8 HOURS PRN
Status: DISCONTINUED | OUTPATIENT
Start: 2022-06-08 | End: 2022-06-10 | Stop reason: HOSPADM

## 2022-06-08 RX ORDER — OXYCODONE HYDROCHLORIDE 5 MG/1
10 TABLET ORAL EVERY 4 HOURS PRN
Status: DISCONTINUED | OUTPATIENT
Start: 2022-06-08 | End: 2022-06-08

## 2022-06-08 RX ORDER — PRENATAL WITH FERROUS FUM AND FOLIC ACID 3080; 920; 120; 400; 22; 1.84; 3; 20; 10; 1; 12; 200; 27; 25; 2 [IU]/1; [IU]/1; MG/1; [IU]/1; MG/1; MG/1; MG/1; MG/1; MG/1; MG/1; UG/1; MG/1; MG/1; MG/1; MG/1
1 TABLET ORAL DAILY
Status: DISCONTINUED | OUTPATIENT
Start: 2022-06-08 | End: 2022-06-10 | Stop reason: HOSPADM

## 2022-06-08 RX ORDER — TRANEXAMIC ACID 100 MG/ML
1000 INJECTION, SOLUTION INTRAVENOUS ONCE AS NEEDED
Status: DISCONTINUED | OUTPATIENT
Start: 2022-06-08 | End: 2022-06-10 | Stop reason: HOSPADM

## 2022-06-08 RX ORDER — IBUPROFEN 400 MG/1
800 TABLET ORAL
Status: DISCONTINUED | OUTPATIENT
Start: 2022-06-09 | End: 2022-06-10 | Stop reason: HOSPADM

## 2022-06-08 RX ORDER — MISOPROSTOL 200 UG/1
800 TABLET ORAL ONCE AS NEEDED
Status: DISCONTINUED | OUTPATIENT
Start: 2022-06-08 | End: 2022-06-10 | Stop reason: HOSPADM

## 2022-06-08 RX ORDER — DIPHENHYDRAMINE HYDROCHLORIDE 50 MG/ML
12.5 INJECTION INTRAMUSCULAR; INTRAVENOUS
Status: DISCONTINUED | OUTPATIENT
Start: 2022-06-08 | End: 2022-06-10 | Stop reason: HOSPADM

## 2022-06-08 RX ORDER — OXYCODONE HYDROCHLORIDE 5 MG/1
5 TABLET ORAL EVERY 4 HOURS PRN
Status: DISCONTINUED | OUTPATIENT
Start: 2022-06-08 | End: 2022-06-08

## 2022-06-08 RX ORDER — SIMETHICONE 80 MG
1 TABLET,CHEWABLE ORAL EVERY 6 HOURS PRN
Status: DISCONTINUED | OUTPATIENT
Start: 2022-06-08 | End: 2022-06-10 | Stop reason: HOSPADM

## 2022-06-08 RX ORDER — PROCHLORPERAZINE EDISYLATE 5 MG/ML
5 INJECTION INTRAMUSCULAR; INTRAVENOUS EVERY 6 HOURS PRN
Status: DISCONTINUED | OUTPATIENT
Start: 2022-06-08 | End: 2022-06-10 | Stop reason: HOSPADM

## 2022-06-08 RX ORDER — METHYLERGONOVINE MALEATE 0.2 MG/ML
200 INJECTION INTRAVENOUS ONCE AS NEEDED
Status: DISCONTINUED | OUTPATIENT
Start: 2022-06-08 | End: 2022-06-10 | Stop reason: HOSPADM

## 2022-06-08 RX ORDER — OXYTOCIN 10 [USP'U]/ML
INJECTION, SOLUTION INTRAMUSCULAR; INTRAVENOUS
Status: DISCONTINUED | OUTPATIENT
Start: 2022-06-08 | End: 2022-06-08

## 2022-06-08 RX ORDER — ONDANSETRON 2 MG/ML
4 INJECTION INTRAMUSCULAR; INTRAVENOUS EVERY 6 HOURS PRN
Status: ACTIVE | OUTPATIENT
Start: 2022-06-08 | End: 2022-06-09

## 2022-06-08 RX ORDER — FAMOTIDINE 10 MG/ML
20 INJECTION INTRAVENOUS
Status: COMPLETED | OUTPATIENT
Start: 2022-06-08 | End: 2022-06-08

## 2022-06-08 RX ORDER — ACETAMINOPHEN 325 MG/1
650 TABLET ORAL EVERY 6 HOURS
Status: DISCONTINUED | OUTPATIENT
Start: 2022-06-08 | End: 2022-06-08

## 2022-06-08 RX ORDER — KETOROLAC TROMETHAMINE 30 MG/ML
30 INJECTION, SOLUTION INTRAMUSCULAR; INTRAVENOUS EVERY 6 HOURS
Status: DISCONTINUED | OUTPATIENT
Start: 2022-06-08 | End: 2022-06-08

## 2022-06-08 RX ORDER — FENTANYL CITRATE 50 UG/ML
INJECTION, SOLUTION INTRAMUSCULAR; INTRAVENOUS
Status: DISCONTINUED | OUTPATIENT
Start: 2022-06-08 | End: 2022-06-08

## 2022-06-08 RX ORDER — SODIUM CITRATE AND CITRIC ACID MONOHYDRATE 334; 500 MG/5ML; MG/5ML
30 SOLUTION ORAL
Status: COMPLETED | OUTPATIENT
Start: 2022-06-08 | End: 2022-06-08

## 2022-06-08 RX ORDER — PROCHLORPERAZINE EDISYLATE 5 MG/ML
5 INJECTION INTRAMUSCULAR; INTRAVENOUS EVERY 6 HOURS PRN
Status: DISCONTINUED | OUTPATIENT
Start: 2022-06-08 | End: 2022-06-08

## 2022-06-08 RX ORDER — ONDANSETRON 2 MG/ML
4 INJECTION INTRAMUSCULAR; INTRAVENOUS EVERY 6 HOURS PRN
Status: DISCONTINUED | OUTPATIENT
Start: 2022-06-08 | End: 2022-06-10 | Stop reason: HOSPADM

## 2022-06-08 RX ORDER — LANOLIN ALCOHOL/MO/W.PET/CERES
1 CREAM (GRAM) TOPICAL DAILY
Status: DISCONTINUED | OUTPATIENT
Start: 2022-06-09 | End: 2022-06-10 | Stop reason: HOSPADM

## 2022-06-08 RX ADMIN — PHENYLEPHRINE HYDROCHLORIDE 0.5 MCG/KG/MIN: 10 INJECTION INTRAVENOUS at 06:06

## 2022-06-08 RX ADMIN — KETOROLAC TROMETHAMINE 30 MG: 30 INJECTION, SOLUTION INTRAMUSCULAR at 02:06

## 2022-06-08 RX ADMIN — ONDANSETRON 4 MG: 2 INJECTION INTRAMUSCULAR; INTRAVENOUS at 06:06

## 2022-06-08 RX ADMIN — ACETAMINOPHEN 1000 MG: 500 TABLET, FILM COATED ORAL at 05:06

## 2022-06-08 RX ADMIN — FAMOTIDINE 20 MG: 10 INJECTION, SOLUTION INTRAVENOUS at 05:06

## 2022-06-08 RX ADMIN — KETOROLAC TROMETHAMINE 30 MG: 30 INJECTION, SOLUTION INTRAMUSCULAR at 09:06

## 2022-06-08 RX ADMIN — DOCUSATE SODIUM 200 MG: 100 CAPSULE, LIQUID FILLED ORAL at 09:06

## 2022-06-08 RX ADMIN — Medication 650 MG: at 06:06

## 2022-06-08 RX ADMIN — Medication 95 MILLI-UNITS/MIN: at 08:06

## 2022-06-08 RX ADMIN — SODIUM CHLORIDE, SODIUM LACTATE, POTASSIUM CHLORIDE, AND CALCIUM CHLORIDE: 600; 310; 30; 20 INJECTION, SOLUTION INTRAVENOUS at 06:06

## 2022-06-08 RX ADMIN — Medication 0.1 MG: at 06:06

## 2022-06-08 RX ADMIN — OXYTOCIN 3 UNITS: 10 INJECTION, SOLUTION INTRAMUSCULAR; INTRAVENOUS at 07:06

## 2022-06-08 RX ADMIN — OXYTOCIN 5 UNITS: 10 INJECTION, SOLUTION INTRAMUSCULAR; INTRAVENOUS at 07:06

## 2022-06-08 RX ADMIN — OXYCODONE HYDROCHLORIDE 10 MG: 5 TABLET ORAL at 12:06

## 2022-06-08 RX ADMIN — SODIUM CITRATE AND CITRIC ACID MONOHYDRATE 30 ML: 500; 334 SOLUTION ORAL at 05:06

## 2022-06-08 RX ADMIN — Medication 650 MG: at 12:06

## 2022-06-08 RX ADMIN — Medication 650 MG: at 09:06

## 2022-06-08 RX ADMIN — OXYCODONE HYDROCHLORIDE 10 MG: 5 TABLET ORAL at 04:06

## 2022-06-08 RX ADMIN — BUPIVACAINE HYDROCHLORIDE IN DEXTROSE 1.6 ML: 7.5 INJECTION, SOLUTION SUBARACHNOID at 06:06

## 2022-06-08 RX ADMIN — CEFAZOLIN 2 G: 330 INJECTION, POWDER, FOR SOLUTION INTRAMUSCULAR; INTRAVENOUS at 06:06

## 2022-06-08 RX ADMIN — OXYCODONE 5 MG: 5 TABLET ORAL at 08:06

## 2022-06-08 RX ADMIN — OXYCODONE HYDROCHLORIDE 10 MG: 5 TABLET ORAL at 09:06

## 2022-06-08 RX ADMIN — KETOROLAC TROMETHAMINE 30 MG: 30 INJECTION, SOLUTION INTRAMUSCULAR at 08:06

## 2022-06-08 RX ADMIN — FENTANYL CITRATE 10 MCG: 50 INJECTION, SOLUTION INTRAMUSCULAR; INTRAVENOUS at 06:06

## 2022-06-08 RX ADMIN — DEXAMETHASONE SODIUM PHOSPHATE 4 MG: 4 INJECTION INTRA-ARTICULAR; INTRALESIONAL; INTRAMUSCULAR; INTRAVENOUS; SOFT TISSUE at 06:06

## 2022-06-08 NOTE — PLAN OF CARE
Patient safety maintained, side rails up, bed low and locked position. Pt ambulating independently. Attempted to void @ 1630 - felt urge. Another RN in room at 1830 bc pt unable to void, but feels the urge. Deviated to the right. In-and-out done at 1830 - only 50 mL back, but pt reports feeling relief. Pt midline. Pt had no rubra prior to this incident, now bleeding is moderate. Pain well controlled with PRN pain medication. . Patient responding to infant cues. Dressing C/D/I. Significant other at bedside and assisting in patient's care. Will continue to monitor.

## 2022-06-08 NOTE — INTERVAL H&P NOTE
The patient has been examined and the H&P has been reviewed:    I concur with the findings and no changes have occurred since H&P was written.    Anesthesia/Surgery risks, benefits and alternative options discussed and understood by patient/family.      Jane Muller is 32 y.o.  at 39w2d wga presenting for scheduled repeat  delivery.     FHT: 140 bpm, moderate BTBV, +accels, -decels; Cat 1 (reassuring)  [place]: None  Presentation: cephalic by ultrasound    SVE: deferred    BG 83 on admit     To OR for rLTCS w/ Dr. Easley.     Natacha Leach MD   PGY-2, OB-GYN         There are no hospital problems to display for this patient.

## 2022-06-08 NOTE — ANESTHESIA PREPROCEDURE EVALUATION
[facility]  Anesthesia Pre-Operative Evaluation         Patient Name: Jane Muller  YOB: 1990  MRN: [id number]    2022      Jane Muller is a 32 y.o. female  @ 39w2d who presents for scheduled C/S. Her previous pregnancy culminated in elective C/S. This pregnancy has been complicated by gDM. She denies HTN, asthma, coagulopathies, spinal pathology.     OB History    Para Term  AB Living   2 1 1 0 0 1   SAB IAB Ectopic Multiple Live Births   0 0 0 0 1      # Outcome Date GA Lbr Maxwell/2nd Weight Sex Delivery Anes PTL Lv   2 Current            1 Term 10/01/20 39w1d  3.26 kg (7 lb 3 oz) M CS-LTranv Spinal  TRANG      Obstetric Comments   Menarche 15       Review of patient's allergies indicates:  No Known Allergies    Wt Readings from Last 1 Encounters:   22 0540 78.6 kg (173 lb 4.5 oz)       BP Readings from Last 3 Encounters:   22 114/68   22 110/62   22 108/70       Patient Active Problem List   Diagnosis    Fibrocystic breast    History of weight loss surgery    Gestational diabetes mellitus (GDM) in second trimester     delivery delivered       Past Surgical History:   Procedure Laterality Date     SECTION N/A 10/1/2020    Procedure:  SECTION;  Surgeon: Erwin Brandt MD;  Location: [facility] L&D;  Service: OB/GYN;  Laterality: N/A;    STOMACH SURGERY  2017    Dr Owens - sleeve gastrectomy - was 190lbs       Social History     Socioeconomic History    Marital status:    Tobacco Use    Smoking status: Former Smoker    Smokeless tobacco: Never Used   Substance and Sexual Activity    Alcohol use: Yes     Comment: social    Drug use: No    Sexual activity: Yes     Partners: Male     Birth control/protection: None         Chemistry        Component Value Date/Time     2021 1048    K 4.5 2021 1048      03/29/2021 1048    CO2 23 03/29/2021 1048    BUN 12 03/29/2021 1048    CREATININE 0.8 03/29/2021 1048    GLU 84 03/29/2021 1048        Component Value Date/Time    CALCIUM 9.6 03/29/2021 1048    ALKPHOS 81 03/29/2021 1048    AST 19 03/29/2021 1048    ALT 13 03/29/2021 1048    BILITOT 0.7 03/29/2021 1048    ESTGFRAFRICA >60 03/29/2021 1048    EGFRNONAA >60 03/29/2021 1048            Lab Results   Component Value Date    WBC 9.66 05/16/2022    HGB 9.7 (L) 05/16/2022    HCT 32.4 (L) 05/16/2022    MCV 88 05/16/2022     05/16/2022       No results for input(s): PT, INR, PROTIME, APTT in the last 72 hours.            Pre-op Assessment          Review of Systems  Anesthesia Hx:  Denies Family Hx of Anesthesia complications.   Denies Personal Hx of Anesthesia complications.   Social:  Non-Smoker, No Alcohol Use    Cardiovascular:  Cardiovascular Normal     Pulmonary:  Pulmonary Normal    Endocrine:  Endocrine Normal        Physical Exam  General: Well nourished and Cooperative    Airway:  Mallampati: II / I      Dental:  Intact        Anesthesia Plan  Type of Anesthesia, risks & benefits discussed:    Anesthesia Type: Spinal, CSE  Intra-op Monitoring Plan: Standard ASA Monitors  Post Op Pain Control Plan: multimodal analgesia  Informed Consent: Informed consent signed with the Patient and all parties understand the risks and agree with anesthesia plan.  All questions answered.   ASA Score: 2  Day of Surgery Review of History & Physical: H&P Update referred to the surgeon/provider.    Ready For Surgery From Anesthesia Perspective.     .

## 2022-06-08 NOTE — ANESTHESIA PROCEDURE NOTES
Spinal    Diagnosis: IUP  Patient location during procedure: OR  Start time: 6/8/2022 6:40 AM  Timeout: 6/8/2022 6:40 AM  End time: 6/8/2022 6:43 AM    Staffing  Authorizing Provider: Jojo Delgado MD  Performing Provider: Jojo Delgado MD    Preanesthetic Checklist  Completed: patient identified, IV checked, site marked, risks and benefits discussed, surgical consent, monitors and equipment checked, pre-op evaluation and timeout performed  Spinal Block  Patient position: sitting  Prep: ChloraPrep  Patient monitoring: heart rate, continuous pulse ox and frequent blood pressure checks  Approach: midline  Location: L3-4  Injection technique: single shot  CSF Fluid: clear free-flowing CSF  Needle  Needle type: pencil-tip   Needle gauge: 25 G  Needle length: 5 in  Additional Documentation: incremental injection, negative aspiration for heme and no paresthesia on injection  Needle localization: anatomical landmarks  Assessment  Sensory level: T4   Dermatomal levels determined by pinch or prick  Ease of block: easy  Patient's tolerance of the procedure: comfortable throughout block

## 2022-06-08 NOTE — LACTATION NOTE
Patient requests not to be seen by lactation during her stay. Patient aware that this resource is always available if she changes her mind. LCT notified. Patient requests to feed with formula instead. Feeding plan changed to formula feeding. Risks of FF reviewed. Patient verbalized understanding.

## 2022-06-08 NOTE — TRANSFER OF CARE
"Anesthesia Transfer of Care Note    Patient: Jane Muller    Procedure(s) Performed: Procedure(s) (LRB):   SECTION (N/A)    Patient location: PACU    Anesthesia Type: spinal    Transport from OR: Transported from OR on room air with adequate spontaneous ventilation    Post pain: adequate analgesia    Post assessment: no apparent anesthetic complications and tolerated procedure well    Post vital signs: stable    Level of consciousness: awake, alert and oriented    Nausea/Vomiting: no nausea/vomiting    Complications: none    Transfer of care protocol was followed      Last vitals:   Visit Vitals  /71   Pulse 93   Temp 36.7 °C (98.1 °F) (Oral)   Resp 16   Ht 5' 5" (1.651 m)   Wt 78.6 kg (173 lb 4.5 oz)   SpO2 97%   Breastfeeding No   BMI 28.84 kg/m²     "

## 2022-06-08 NOTE — L&D DELIVERY NOTE
[facility] - Labor & Delivery   Section   Operative Note    SUMMARY     Date of Procedure: 2022     Procedure: Procedure(s) (LRB):   SECTION (N/A)    Surgeon(s) and Role:     * Radha Easley MD - Primary     * Arielle Green MD - Resident - Assisting    Pre-Operative Diagnosis: History of  delivery, antepartum [O34.219]    Post-Operative Diagnosis: Post-Op Diagnosis Codes:     * History of  delivery, antepartum [O34.219]    Anesthesia: Spinal/Epidural    Description of the Findings of the Procedure: Normal appearing uterus, bilateral tubes and ovaries. Vigorous liveborn female infant.    Significant Surgical Tasks Conducted by the Assistant(s), if Applicable: Retraction, fundal pressure    Complications: No    Blood Loss: 375 cc     Indication:   32 y.o.  at 39w2d here for RLTCD. Pt declines TOLAC.    Procedure in detail:    The patient was taken to the operating room where epidural/spinal anesthesia was found to be adequate. She was then prepped and draped in the normal sterile fashion. A sheriff catheter was in place prior to start of the surgery. Adequate anesthesia was then confirmed using allis clamps.    After a Time Out was performed, a scalpel was used to make a Pfannensteil incision. The bovie was used to carry down to the underlying layer of fascia. The fascia was then incised in the midline. The bovie was then used to extend the fascia incision laterally. The fascia was then elevated using the kocher clamps and extended both inferiorly and superiorly using the curved awan scissors. The rectus muscles were then  in the midline and the peritoneum was then entered and extended bluntly. The peritoneal opening did require further widening with Metzenbaum scissors. The bladder blade was then inserted and the vesicouterine peritoneum was then entered sharply with the Metzenbaum scissors and extended laterally and the bladder flap was created digitally. The  bladder blade was then reinserted.     The inside scalpel was then used to make a LOW TRANSVERSE incision in the uterus. This was extended bluntly. The amniotic fluid was noted to be Clear. The infant's head was delivered atraumatically followed by the remainder of the infant's body. The nose and mouth were suctioned with the bulb suction. The cord was clamped and cut and the baby was handed off to awaiting pediatric attendant. The placenta was then removed and the uterus was exteriorized and cleared of all clots and debris. The bladder blade was then reinserted.     The uterine incision was then closed using a #1 Vicryl in a running locked suture. A second imbricating suture of #1 Vicrylwas used to close a second layer. The hysterotomy was examined and good hemostasis was noted. The abdomen was then cleared of all clots and debris. The uterus was then returned to the abdomen. The bladder blade was replaced and the uterine incision was reexamined and noted to have excellent hemostasis.     The peritoneum and rectus muscles were then re approximated using 2-0 Vicryl in a running fashion. The rectus muscles were then irrigated and and bleeding areas were cauterized using the Bovie. When hemostasis was confirmed the fascia was then closed using #1 Vicryl in a running fashion. The subcutaneous tissue was judiciously cauterized as needed. 2-0 plain gut was then used to re approximate this layer. 4-0 Monocryl was then used to close the skin in a subcuticular fashion. The patient tolerated the procedure well. Sponge lap and needle counts were correct x 2.          Specimens:   Specimen (24h ago, onward)            None          Condition: Good    Disposition: PACU - hemodynamically stable.    Attestation: Good         Delivery Information for Flaco Muller    Birth information:  YOB: 2021   Time of birth: 7:01 AM   Sex: female   Head Delivery Date/Time: 2022  7:12 AM   Delivery type: , Low  Transverse   Gestational Age: 39w2d    Delivery Providers    Delivering clinician: Radha Easley MD   Provider Role    Arielle Green MD Resident    Shannon Kruse, [facility]    Kelly Willingham RN Circulator    Gabrielle Ugalde RN Registered Nurse    Jojo Delgado MD Anesthesiologist            Measurements    Weight: 2950 g  Weight (lbs): 6 lb 8.1 oz  Length:          Apgars    Living status: Living  Apgars:  1 min.:  5 min.:  10 min.:  15 min.:  20 min.:    Skin color:  1  1       Heart rate:  2  2       Reflex irritability:  2  2       Muscle tone:  2  2       Respiratory effort:  2  2       Total:  9  9       Apgars assigned by: ONIEL UGALDE RN         Operative Delivery    Forceps attempted?: No  Vacuum extractor attempted?: No         Shoulder Dystocia    Shoulder dystocia present?: No           Presentation    Presentation: Vertex           Interventions/Resuscitation    Method: Deep Suctioning, Bulb Suctioning, Tactile Stimulation       Cord    Vessels: 3 vessels  Complications: Nuchal  Nuchal Intervention: reduced  Nuchal Cord Description: loose nuchal cord  Number of Loops: 1  Delayed Cord Clamping?: Yes  Cord Clamped Date/Time: 2022  7:02 AM  Cord Blood Disposition: Sent with Baby       Placenta    Placenta delivery date/time: 2022 0703  Placenta removal: Manual removal  Placenta appearance: Intact  Placenta disposition: discarded           Labor Events:       labor: No     Labor Onset Date/Time:         Dilation Complete Date/Time:         Start Pushing Date/Time:         Start Pushing Date/Time:       Rupture Date/Time:            Rupture type:          Fluid Amount:       Fluid Color:       Fluid Odor:       Membrane Status:                steroids: None     Antibiotics given for GBS:       Induction:       Indications for induction:        Augmentation:       Indications for augmentation:       Labor complications: None     Additional complications:           Cervical ripening:                     Delivery:      Episiotomy:       Indication for Episiotomy:       Perineal Lacerations:   Repaired:      Periurethral Laceration:   Repaired:     Labial Laceration:   Repaired:     Sulcus Laceration:   Repaired:     Vaginal Laceration:   Repaired:     Cervical Laceration:   Repaired:     Repair suture:       Repair # of packets:       Last Value - EBL - Nursing (mL):       Sum - EBL - Nursing (mL): 0     Last Value - EBL - Anesthesia (mL):      Calculated QBL (mL): 345      Vaginal Sweep Performed:       Surgicount Correct:         Other providers:       Anesthesia    Method: Spinal          Details (if applicable):  Trial of Labor      Categorization: Repeat    Priority: Routine   Indications for : Repeat Section   Incision Type: low transverse     Additional  information:  Forceps:    Vacuum:    Breech:    Observed anomalies    Other (Comments):

## 2022-06-09 LAB
BASOPHILS # BLD AUTO: 0.04 K/UL (ref 0–0.2)
BASOPHILS NFR BLD: 0.3 % (ref 0–1.9)
DIFFERENTIAL METHOD: ABNORMAL
EOSINOPHIL # BLD AUTO: 0.1 K/UL (ref 0–0.5)
EOSINOPHIL NFR BLD: 0.5 % (ref 0–8)
ERYTHROCYTE [DISTWIDTH] IN BLOOD BY AUTOMATED COUNT: 14.7 % (ref 11.5–14.5)
GLUCOSE SERPL-MCNC: 72 MG/DL (ref 70–110)
HCT VFR BLD AUTO: 25.2 % (ref 37–48.5)
HGB BLD-MCNC: 7.9 G/DL (ref 12–16)
IMM GRANULOCYTES # BLD AUTO: 0.07 K/UL (ref 0–0.04)
IMM GRANULOCYTES NFR BLD AUTO: 0.5 % (ref 0–0.5)
LYMPHOCYTES # BLD AUTO: 3.6 K/UL (ref 1–4.8)
LYMPHOCYTES NFR BLD: 26.6 % (ref 18–48)
MCH RBC QN AUTO: 26.3 PG (ref 27–31)
MCHC RBC AUTO-ENTMCNC: 31.3 G/DL (ref 32–36)
MCV RBC AUTO: 84 FL (ref 82–98)
MONOCYTES # BLD AUTO: 1.2 K/UL (ref 0.3–1)
MONOCYTES NFR BLD: 8.9 % (ref 4–15)
NEUTROPHILS # BLD AUTO: 8.5 K/UL (ref 1.8–7.7)
NEUTROPHILS NFR BLD: 63.2 % (ref 38–73)
NRBC BLD-RTO: 0 /100 WBC
PLATELET # BLD AUTO: 183 K/UL (ref 150–450)
PMV BLD AUTO: 12.2 FL (ref 9.2–12.9)
RBC # BLD AUTO: 3 M/UL (ref 4–5.4)
WBC # BLD AUTO: 13.38 K/UL (ref 3.9–12.7)

## 2022-06-09 PROCEDURE — 36415 COLL VENOUS BLD VENIPUNCTURE: CPT | Performed by: OBSTETRICS & GYNECOLOGY

## 2022-06-09 PROCEDURE — 11000001 HC ACUTE MED/SURG PRIVATE ROOM

## 2022-06-09 PROCEDURE — 99024 POSTOP FOLLOW-UP VISIT: CPT | Mod: ,,, | Performed by: OBSTETRICS & GYNECOLOGY

## 2022-06-09 PROCEDURE — 25000003 PHARM REV CODE 250

## 2022-06-09 PROCEDURE — 63600175 PHARM REV CODE 636 W HCPCS

## 2022-06-09 PROCEDURE — 82947 ASSAY GLUCOSE BLOOD QUANT: CPT | Performed by: OBSTETRICS & GYNECOLOGY

## 2022-06-09 PROCEDURE — 99024 PR POST-OP FOLLOW-UP VISIT: ICD-10-PCS | Mod: ,,, | Performed by: OBSTETRICS & GYNECOLOGY

## 2022-06-09 PROCEDURE — 85025 COMPLETE CBC W/AUTO DIFF WBC: CPT

## 2022-06-09 RX ADMIN — OXYCODONE HYDROCHLORIDE 10 MG: 5 TABLET ORAL at 07:06

## 2022-06-09 RX ADMIN — PRENATAL VIT W/ FE FUMARATE-FA TAB 27-0.8 MG 1 TABLET: 27-0.8 TAB at 08:06

## 2022-06-09 RX ADMIN — Medication 650 MG: at 05:06

## 2022-06-09 RX ADMIN — OXYCODONE HYDROCHLORIDE 10 MG: 5 TABLET ORAL at 12:06

## 2022-06-09 RX ADMIN — FERROUS SULFATE TAB 325 MG (65 MG ELEMENTAL FE) 1 EACH: 325 (65 FE) TAB at 08:06

## 2022-06-09 RX ADMIN — KETOROLAC TROMETHAMINE 30 MG: 30 INJECTION, SOLUTION INTRAMUSCULAR at 02:06

## 2022-06-09 RX ADMIN — Medication 650 MG: at 02:06

## 2022-06-09 RX ADMIN — ONDANSETRON 8 MG: 8 TABLET, ORALLY DISINTEGRATING ORAL at 10:06

## 2022-06-09 RX ADMIN — Medication 650 MG: at 11:06

## 2022-06-09 RX ADMIN — Medication 650 MG: at 10:06

## 2022-06-09 RX ADMIN — DOCUSATE SODIUM 200 MG: 100 CAPSULE, LIQUID FILLED ORAL at 08:06

## 2022-06-09 RX ADMIN — OXYCODONE HYDROCHLORIDE 10 MG: 5 TABLET ORAL at 01:06

## 2022-06-09 RX ADMIN — IBUPROFEN 800 MG: 400 TABLET, FILM COATED ORAL at 04:06

## 2022-06-09 RX ADMIN — IBUPROFEN 800 MG: 400 TABLET, FILM COATED ORAL at 08:06

## 2022-06-09 RX ADMIN — DOCUSATE SODIUM 200 MG: 100 CAPSULE, LIQUID FILLED ORAL at 07:06

## 2022-06-09 RX ADMIN — IBUPROFEN 800 MG: 400 TABLET, FILM COATED ORAL at 11:06

## 2022-06-09 NOTE — PLAN OF CARE
Plan of care reviewed with pt, verbalizes understanding. VSS. Ambulating and voiding without difficulty. Pain well controlled with scheduled and PRN pain meds. Fall and safety measures maintained. Bed low and locked, call bell and personal items within reach. Will continue to monitor.

## 2022-06-09 NOTE — ED NOTES
LS POD#1 s/p rLTCS    H/H Pre: 10/33  ( mL)  Post: 8/25  Dx: A2GDM, Anemia, h/o LTCS x2, BC: Schex  Meds: ibuprofen, norco, no PP Lovenox,   FEMALE  [ ] Fasting BG

## 2022-06-09 NOTE — ANESTHESIA POSTPROCEDURE EVALUATION
Anesthesia Post Evaluation    Patient: Jane Muller    Procedure(s) Performed: Procedure(s) (LRB):   SECTION (N/A)    Final Anesthesia Type: spinal      Patient location during evaluation: floor  Patient participation: Yes- Able to Participate  Level of consciousness: awake and alert  Post-procedure vital signs: reviewed and stable  Pain management: adequate  Airway patency: patent  FRANKIE mitigation strategies: Use of major conduction anesthesia (spinal/epidural) or peripheral nerve block and Multimodal analgesia  PONV status at discharge: No PONV  Anesthetic complications: no      Cardiovascular status: blood pressure returned to baseline  Respiratory status: unassisted, spontaneous ventilation and room air  Hydration status: euvolemic  Follow-up not needed.          Vitals Value Taken Time   /67 22   Temp 36.5 °C (97.7 °F) 22   Pulse 72 22   Resp 18 22   SpO2 98 % 22         Event Time   Out of Recovery 09:55:00         Pain/Wei Score: Pain Rating Prior to Med Admin: 5 (2022 10:24 AM)  Pain Rating Post Med Admin: 4 (2022  9:02 AM)

## 2022-06-09 NOTE — PROGRESS NOTES
POSTPARTUM PROGRESS NOTE    Subjective:     PPD/POD#: 1   Procedure: Repeat LTCS   EGA: 39w3d   N/V: No   F/C: No   Abd Pain: Mild, well-controlled with oral pain medication   Lochia: Moderate, soaking <1 pad/hr   Voiding: Yes   Ambulating: Yes   Bowel fnc: Yes   Breastfeeding: No   Contraception: Per primary OB   Circumcision: N/A, female     Objective:     Temp:  [97.7 °F (36.5 °C)-98.2 °F (36.8 °C)] 97.7 °F (36.5 °C)  Pulse:  [62-80] 72  Resp:  [15-19] 18  SpO2:  [95 %-98 %] 98 %  BP: ()/(54-67) 117/67      Lung: Normal respiratory effort   Abdomen: Soft, appropriately tender  Bandage in place, dry and intact   Uterus: Firm, no fundal tenderness   Extremities: Trace edema bilaterally     Lab Review    Recent Labs   Lab 06/08/22  0542 06/09/22  0545   WBC 10.30 13.38*   HGB 10.4* 7.9*   HCT 33.2* 25.2*   MCV 85 84    183     Fasting glucose: 72    Assessment and Plan:     POSTPARTUM CARE - Doing well.  Continue routine care. Bottle-feeding due to lack of milk supply.  GESTATIONAL DM - PP fasting blood glucose reviewed.  No meds indicated.  2-hr GTT at PP appt.  IRON DEF. ANEMIA - H/H reviewed.  Asymptomatic.  Continue FeSO4.  DISPO - Doing well.  Stable for discharge in next 1-2 days.        Cat Caldwell

## 2022-06-10 VITALS
HEART RATE: 73 BPM | DIASTOLIC BLOOD PRESSURE: 73 MMHG | RESPIRATION RATE: 18 BRPM | WEIGHT: 173.31 LBS | OXYGEN SATURATION: 97 % | SYSTOLIC BLOOD PRESSURE: 105 MMHG | BODY MASS INDEX: 28.87 KG/M2 | HEIGHT: 65 IN | TEMPERATURE: 99 F

## 2022-06-10 PROCEDURE — 25000003 PHARM REV CODE 250

## 2022-06-10 RX ORDER — IBUPROFEN 800 MG/1
800 TABLET ORAL EVERY 8 HOURS
Qty: 30 TABLET | Refills: 0 | Status: SHIPPED | OUTPATIENT
Start: 2022-06-10 | End: 2023-05-26

## 2022-06-10 RX ORDER — OXYCODONE HYDROCHLORIDE 5 MG/1
5 TABLET ORAL EVERY 4 HOURS PRN
Qty: 10 TABLET | Refills: 0 | Status: SHIPPED | OUTPATIENT
Start: 2022-06-10 | End: 2022-12-27

## 2022-06-10 RX ADMIN — PRENATAL VIT W/ FE FUMARATE-FA TAB 27-0.8 MG 1 TABLET: 27-0.8 TAB at 08:06

## 2022-06-10 RX ADMIN — DOCUSATE SODIUM 200 MG: 100 CAPSULE, LIQUID FILLED ORAL at 08:06

## 2022-06-10 RX ADMIN — FERROUS SULFATE TAB 325 MG (65 MG ELEMENTAL FE) 1 EACH: 325 (65 FE) TAB at 08:06

## 2022-06-10 RX ADMIN — Medication 650 MG: at 06:06

## 2022-06-10 RX ADMIN — OXYCODONE HYDROCHLORIDE 10 MG: 5 TABLET ORAL at 01:06

## 2022-06-10 RX ADMIN — IBUPROFEN 800 MG: 400 TABLET, FILM COATED ORAL at 08:06

## 2022-06-10 NOTE — DISCHARGE SUMMARY
Delivery Discharge Summary  Obstetrics      Primary OB Clinician: Radha Easley MD    Discharge Provider: Radha Easley MD    Admission date: 2022  Discharge date: 06/10/2022    Admit Dx:   Discharge Dx:    Patient Active Problem List   Diagnosis    Fibrocystic breast    History of weight loss surgery    Gestational diabetes mellitus (GDM) in second trimester     delivery delivered    Status post repeat low transverse  section       Procedure: Repeat Low Transverse  Delivery    Hospital Course:  Jane Muller is a 32 y.o. now  who was admitted on 2022 for delivery. Patient delivered a viable . Please see delivery note for further details. Pt was in stable condition post delivery and was transferred to the Mother-Baby Unit. Her postpartum course was uncomplicated. On the date of discharge, POD2, patient's pain is controlled with oral pain medications. She is tolerating ambulation without SOB or CP, and PO diet without N/V. Reported lochia is within the normal range. Pt in stable condition and ready for discharge.     Pertinent studies:  Postpartum CBC  Lab Results   Component Value Date    WBC 13.38 (H) 2022    HGB 7.9 (L) 2022    HCT 25.2 (L) 2022    MCV 84 2022     2022       Delivery:    Episiotomy:     Lacerations:     Repair suture:     Repair # of packets:     Blood loss (ml):       Birth information:  YOB: 2021   Time of birth: 7:01 AM   Sex: female   Delivery type: , Low Transverse   Gestational Age: 39w2d    Delivery Clinician:      Other providers:       Additional  information:  Forceps:    Vacuum:    Breech:    Observed anomalies      Living?:           APGARS  One minute Five minutes Ten minutes   Skin color:         Heart rate:         Grimace:         Muscle tone:         Breathing:         Totals: 9  9        Placenta: Delivered:       appearance      Disposition:  To home, self care    Follow Up: 2 weeks    Patient Instructions:   1. Call the office for any bleeding >2 pads/hour for >2 hours, temperature >100.4, pain that is uncontrolled with medications, or for any other concerns.  2. Pelvic rest and no tub baths x 6 weeks.  3. No driving while on narcotics.    Current Discharge Medication List      CONTINUE these medications which have NOT CHANGED    Details   esomeprazole (NEXIUM) 20 MG capsule Take 20 mg by mouth before breakfast.      ferrous sulfate (FEOSOL) 325 mg (65 mg iron) Tab tablet Take 325 mg by mouth every other day.      metFORMIN (GLUCOPHAGE) 1000 MG tablet Take 1 tablet (1,000 mg total) by mouth 2 (two) times daily with meals.  Qty: 60 tablet, Refills: 4    Associated Diagnoses: Gestational diabetes mellitus (GDM) in third trimester, gestational diabetes method of control unspecified      ondansetron (ZOFRAN) 4 MG tablet Take 1 tablet (4 mg total) by mouth daily as needed for Nausea.  Qty: 30 tablet, Refills: 1    Associated Diagnoses: Nausea/vomiting in pregnancy      prenatal vit no.124/iron/folic (PRENATAL VITAMIN ORAL) Take by mouth.             Radha Easley

## 2022-06-10 NOTE — DISCHARGE INSTRUCTIONS
Formula Feeding Discharge Instructions    Formula feeding discharge instructions given with Non-Nursing Engorgement and Community Resources reviewed in Formula Feeding Guide.  Baby is to be fed by the Baby Led bottle feeding method:  Feed on Cue:  Hunger cues - hands to mouth, bending arms and legs toward the body, sucking noises, puckered lips and rooting/searching for the nipple  Method of feeding the baby:  always hold the baby upright, never prop a bottle  brush the nipple across babys upper lip and wait to open  hold bottle in a flat position, only partly full  allow baby to pause and take breaks; burp as needed  feeding lasts about 15 - 20 minutes  Stop feeding with signs of fullness  Fullness cues - sucking slows or stops, relaxed hands and arms, pushes away, falls asleep  Preparing Powdered Formula:  Remove plastic lid and wash lid with soap and water, dry and label with date  Clean top of can & open.  Remove scoop.  Follow s instructions on quantity of water and powder  Follow pediatricians recommendation on the type of water to use  Shake well prior to feeding  For pre-mixed formula - Refrigerate and use within 24 hours.  Re-warm individual bottles immediately prior to use.  Formula expires 1 hour after in initiation of the feeding  Preparing Liquid Concentrate Formula:  Follow pediatricians recommendation on the type of water to use  Add equal amounts of liquid concentrate and formula to the bottle  Shake well prior to feeding  For pre-mixed formula - Refrigerate and use within 24 hours.  Re-warm individual bottles immediately prior to use  For formula remaining in the can, cover and refrigerate until needed.  Use within 48 hours  Formula expires 1 hour after in initiation of the feeding    Preparing Ready to Feed Formula:  Shake container well prior to opening  Pour enough formula for 1 feeding into a clean bottle  Do not add water or any other liquid  Attach nipple and cap  Shake well  prior to feeding  Feed immediately  For pre-mixed formula - Refrigerate and use within 24 hours.  Re-warm individual bottles immediately prior to use  For formula remaining in the can, cover and refrigerate until needed.  Use within 48 hours  Formula expires 1 hour after in initiation of the feeding  Cleaning and sterilization of equipment for formula preparation:  Clean and disinfect working surface  Wash hands, arms and under fingernails with soap and water; dry using a clean cloth  Use bottle/nipple brush to wash all bottles, nipples, rings, caps and preparation utensils in hot soapy water before initial use and rinse  Sterilize all parts/utensils in boiling water or with a sterilization device prior to use  Continue to wash all parts with warm soapy water and rinse after each use and sterilize daily  Appropriate storage of formula if more than 1 bottle is prepared:  Put a clean nipple right side up on the bottle and cover with a nipple cap  Label each bottle with the date and time prepared  Refrigerate until feeding time  Warm immediately prior to use by a bottle warmer or by running under warm water  Do NOT microwave bottles  For formula remaining in the can, cover and refrigerate until needed.  Use within 48 hours  Formula expires 1 hour after in initiation of the feeding  Safe formula feeding, preparation and transporting of pre-mixed feedings:  Always use thoroughly cleaned and sterilized BPA free bottles  Formula & water preference to be determined by the advice of the pediatrician  Use proper hand washing  Follow all s guidelines for preparing formula  Check all expiration dates  Clean all can tops with soap and water prior to opening; also use a clean can opener  All mixed formula should be refrigerated until immediately prior to transport  Transport in a cool insulated bag with ice packs and use within 2 hours or re-refrigerate at arrival destination  Re-warm feeding at the destination for  no longer than 15 minutes      [facility]     Women, Infants, and Children Nutrition Program   Provides free breastfeeding education, counseling, food coupons, and breast pumps for eligible women. Breastfeeding counseling is provided by peer counselors and mother-to-mother support.      [phone]   [email or web address].gov    [facility] Connects moms, babies, and families in [place] to free help, pregnancy resources, and information about healthy behaviors pre- and . Available .  8-221-727-BABY   www.2241663njea.org   info@3953685gkjm.org    [facility]:  Provides preconception, pregnancy, and post discharge support through nutrition services, primary medical care for children, and many other services. Available on the phone and one-to-one.  [phone]   www.dcsno.org    [facility] (Poison Control)   The American Association of Poison Control Centers supports the [place] [address] poison centers in their efforts to prevent and treat poison exposures. Poison centers offer free, confidential, expert medical advice 24 hours a day, seven days a week.  1-123.463.1345   www.aapcc.org/

## 2022-06-10 NOTE — PLAN OF CARE
Patient in no apparent distress. VSS. Ambulating without difficulty. No needs at this time. Discharge papers signed. Mother Baby care guide reviewed. All questions answered. Will call out for transport.

## 2022-06-11 ENCOUNTER — PATIENT MESSAGE (OUTPATIENT)
Dept: OBSTETRICS AND GYNECOLOGY | Facility: CLINIC | Age: 32
End: 2022-06-11
Payer: COMMERCIAL

## 2022-06-12 ENCOUNTER — PATIENT MESSAGE (OUTPATIENT)
Dept: OBSTETRICS AND GYNECOLOGY | Facility: CLINIC | Age: 32
End: 2022-06-12
Payer: COMMERCIAL

## 2022-06-13 ENCOUNTER — POSTPARTUM VISIT (OUTPATIENT)
Dept: OBSTETRICS AND GYNECOLOGY | Facility: CLINIC | Age: 32
End: 2022-06-13
Payer: COMMERCIAL

## 2022-06-13 VITALS
DIASTOLIC BLOOD PRESSURE: 60 MMHG | SYSTOLIC BLOOD PRESSURE: 104 MMHG | BODY MASS INDEX: 27.73 KG/M2 | HEIGHT: 65 IN | WEIGHT: 166.44 LBS

## 2022-06-13 DIAGNOSIS — Z48.89 ENCOUNTER FOR POSTOPERATIVE WOUND CHECK: Primary | ICD-10-CM

## 2022-06-13 PROCEDURE — 0503F POSTPARTUM CARE VISIT: CPT | Mod: CPTII,S$GLB,, | Performed by: NURSE PRACTITIONER

## 2022-06-13 PROCEDURE — 99999 PR PBB SHADOW E&M-EST. PATIENT-LVL III: CPT | Mod: PBBFAC,,, | Performed by: NURSE PRACTITIONER

## 2022-06-13 PROCEDURE — 99999 PR PBB SHADOW E&M-EST. PATIENT-LVL III: ICD-10-PCS | Mod: PBBFAC,,, | Performed by: NURSE PRACTITIONER

## 2022-06-13 PROCEDURE — 0503F PR POSTPARTUM CARE VISIT: ICD-10-PCS | Mod: CPTII,S$GLB,, | Performed by: NURSE PRACTITIONER

## 2022-06-13 NOTE — PROGRESS NOTES
Postpartum Visit    Jane Muller is a 32 y.o. female  is here for a postpartum visit. She is PPD #6. Reports pulling and sticking discomfort superficially at left LTCS. Region of discomfort appears due to adhesive bandage. Doing well postpartum.    Pregnancy was complicated by: gDM, Anemia.      OB History    Para Term  AB Living   2 2 2 0 0 2   SAB IAB Ectopic Multiple Live Births   0 0 0 0 2      # Outcome Date GA Lbr Maxwell/2nd Weight Sex Delivery Anes PTL Lv   2 Term 22 39w2d  2.95 kg (6 lb 8.1 oz) F CS-LTranv Spinal N TRANG   1 Term 10/01/20 39w1d  3.26 kg (7 lb 3 oz) M CS-LTranv Spinal  TRANG      Obstetric Comments   Menarche 15         ROS:  GENERAL: No fever, chills, fatigability.  VULVAR: No pain, no lesions and no itching.  VAGINAL: No relaxation, no itching, no discharge, no abnormal bleeding and no lesions.  ABDOMEN: No abdominal pain. Denies nausea. Denies vomiting. No diarrhea. No constipation  BREAST: Denies pain. No lumps. No discharge.  URINARY: No incontinence, no nocturia, no frequency and no dysuria.  CARDIOVASCULAR: No chest pain. No shortness of breath. No leg cramps.  NEUROLOGICAL: No headaches. No vision changes.      General appearance - alert, well appearing, and in no distress  Mental status - normal mood, behavior, speech, dress, motor activity, and thought processes  Skin - coloration normal for race, good turgor, warm to touch, no rashes  Abdomen - soft, nontender, nondistended, no masses or organomegaly  7 cm Pfannenstiel incision: Clean, dry, intact - healing well.      Jane was seen today for postpartum care.    Diagnoses and all orders for this visit:    Encounter for postoperative wound check    Encounter for routine postpartum follow-up        Adhesive dressing removed from LTCS- reports relief of discomfort. Site is healing well without complication.  FU in 1 week for wound check.    FU at [facility] as needed.      ALEX Ayers

## 2022-06-22 ENCOUNTER — POSTPARTUM VISIT (OUTPATIENT)
Dept: OBSTETRICS AND GYNECOLOGY | Facility: CLINIC | Age: 32
End: 2022-06-22
Payer: COMMERCIAL

## 2022-06-22 VITALS
BODY MASS INDEX: 26.45 KG/M2 | HEIGHT: 65 IN | WEIGHT: 158.75 LBS | DIASTOLIC BLOOD PRESSURE: 72 MMHG | SYSTOLIC BLOOD PRESSURE: 118 MMHG

## 2022-06-22 DIAGNOSIS — Z86.32 H/O GESTATIONAL DIABETES MELLITUS, NOT CURRENTLY PREGNANT: ICD-10-CM

## 2022-06-22 PROCEDURE — 99024 POSTOP FOLLOW-UP VISIT: CPT | Mod: S$GLB,,, | Performed by: OBSTETRICS & GYNECOLOGY

## 2022-06-22 PROCEDURE — 99999 PR PBB SHADOW E&M-EST. PATIENT-LVL III: CPT | Mod: PBBFAC,,, | Performed by: OBSTETRICS & GYNECOLOGY

## 2022-06-22 PROCEDURE — 99999 PR PBB SHADOW E&M-EST. PATIENT-LVL III: ICD-10-PCS | Mod: PBBFAC,,, | Performed by: OBSTETRICS & GYNECOLOGY

## 2022-06-22 PROCEDURE — 99024 PR POST-OP FOLLOW-UP VISIT: ICD-10-PCS | Mod: S$GLB,,, | Performed by: OBSTETRICS & GYNECOLOGY

## 2022-06-22 NOTE — PROGRESS NOTES
"Subjective:     Jane Muller is a 32 y.o.  who presents for a two week post-operative follow up after a  delivery secondary to repeat c/s. Today she denies nausea or vomiting. Pain has been well controlled. Denies redness or drainage from the incision. Formula Feeding. Reports baby is doing well. Denies symptoms of postpartum depression. Overall doing well.     Objective:     Vitals: /72   Ht 5' 5" (1.651 m)   Wt 72 kg (158 lb 11.7 oz)   LMP  (LMP Unknown)   Breastfeeding No   BMI 26.41 kg/m²     General:  Alert and cooperative   Abdomen: Abdomen is soft, non distended, non-tender.    Incision: Clean, healing well. No erythema or drainage.        Assessment:     1. S/p  delivery - 2 weeks post-op   2. H/o gDM in previous 2 pregnancies    Plan:     1. Continue daily wound care.  2. Pelvic rest for 6 weeks postpartum.    3. Gradually resume normal activities.  4. Return to clinic in 4 weeks for final post partum follow up with IUD placement under U/S guidance.   5. Discussed post partum 2 hour GCT and asked patient to find a date prior to 12 weeks post partum for fasting test.     "

## 2022-06-28 ENCOUNTER — PATIENT MESSAGE (OUTPATIENT)
Dept: OBSTETRICS AND GYNECOLOGY | Facility: CLINIC | Age: 32
End: 2022-06-28
Payer: COMMERCIAL

## 2022-06-29 RX ORDER — NORETHINDRONE ACETATE AND ETHINYL ESTRADIOL .02; 1 MG/1; MG/1
1 TABLET ORAL DAILY
Qty: 28 TABLET | Refills: 11 | Status: SHIPPED | OUTPATIENT
Start: 2022-06-29 | End: 2022-07-26 | Stop reason: SDUPTHER

## 2022-07-26 ENCOUNTER — POSTPARTUM VISIT (OUTPATIENT)
Dept: OBSTETRICS AND GYNECOLOGY | Facility: CLINIC | Age: 32
End: 2022-07-26
Payer: COMMERCIAL

## 2022-07-26 VITALS
BODY MASS INDEX: 28.06 KG/M2 | DIASTOLIC BLOOD PRESSURE: 74 MMHG | SYSTOLIC BLOOD PRESSURE: 98 MMHG | HEIGHT: 65 IN | WEIGHT: 168.44 LBS

## 2022-07-26 DIAGNOSIS — Z30.09 ENCOUNTER FOR OTHER GENERAL COUNSELING AND ADVICE ON CONTRACEPTION: ICD-10-CM

## 2022-07-26 DIAGNOSIS — Z86.32 H/O GESTATIONAL DIABETES MELLITUS, NOT CURRENTLY PREGNANT: ICD-10-CM

## 2022-07-26 PROCEDURE — 99999 PR PBB SHADOW E&M-EST. PATIENT-LVL III: CPT | Mod: PBBFAC,,, | Performed by: OBSTETRICS & GYNECOLOGY

## 2022-07-26 PROCEDURE — 0503F POSTPARTUM CARE VISIT: CPT | Mod: CPTII,S$GLB,, | Performed by: OBSTETRICS & GYNECOLOGY

## 2022-07-26 PROCEDURE — 99999 PR PBB SHADOW E&M-EST. PATIENT-LVL III: ICD-10-PCS | Mod: PBBFAC,,, | Performed by: OBSTETRICS & GYNECOLOGY

## 2022-07-26 PROCEDURE — 0503F PR POSTPARTUM CARE VISIT: ICD-10-PCS | Mod: CPTII,S$GLB,, | Performed by: OBSTETRICS & GYNECOLOGY

## 2022-07-26 RX ORDER — NORETHINDRONE ACETATE AND ETHINYL ESTRADIOL .02; 1 MG/1; MG/1
1 TABLET ORAL DAILY
Qty: 84 TABLET | Refills: 3 | Status: SHIPPED | OUTPATIENT
Start: 2022-07-26 | End: 2022-12-27

## 2022-07-26 NOTE — PROGRESS NOTES
"  Subjective:      Jane Muller is a 32 y.o.  who presents for a postpartum visit.  She is status post  repeat  delivery 7 weeks ago.  Her hospitalization was not complicated.  She is not breastfeeding.  She desires OCPs for contraception and started them a few days ago.  She denies signs and symptoms of postpartum depression.    Her last pap was NILM on 10/3/2019    Objective:     BP 98/74   Ht 5' 5" (1.651 m)   Wt 76.4 kg (168 lb 6.9 oz)   Breastfeeding No   BMI 28.03 kg/m²     General: healthy, no distress  Abdomen: Normal, benign. Well healed pfannenstiel  External Genitalia: normal, well-healed, without lesions or masses      Assessment:     Postpartum care and examination     delivery delivered    H/O gestational diabetes mellitus, not currently pregnant       -    Reminded pt to schedule 2 hour GTT - orders previously placed.    Encounter for other general counseling and advice on contraception  -     norethindrone-ethinyl estradiol (MICROGESTIN 1/20) 1-20 mg-mcg per tablet; Take 1 tablet by mouth once daily.  Dispense: 84 tablet; Refill: 3      Plan:     1. Return to clinic in 3 months for annual exam      "

## 2022-12-27 PROBLEM — K21.9 CHRONIC GERD: Status: ACTIVE | Noted: 2022-12-27

## 2023-05-26 ENCOUNTER — PATIENT MESSAGE (OUTPATIENT)
Dept: OBSTETRICS AND GYNECOLOGY | Facility: CLINIC | Age: 33
End: 2023-05-26

## 2023-05-26 ENCOUNTER — OFFICE VISIT (OUTPATIENT)
Dept: OBSTETRICS AND GYNECOLOGY | Facility: CLINIC | Age: 33
End: 2023-05-26
Payer: COMMERCIAL

## 2023-05-26 VITALS
SYSTOLIC BLOOD PRESSURE: 102 MMHG | WEIGHT: 143.5 LBS | DIASTOLIC BLOOD PRESSURE: 68 MMHG | HEIGHT: 65 IN | BODY MASS INDEX: 23.91 KG/M2

## 2023-05-26 DIAGNOSIS — O09.291 HISTORY OF GESTATIONAL DIABETES IN PRIOR PREGNANCY, CURRENTLY PREGNANT IN FIRST TRIMESTER: ICD-10-CM

## 2023-05-26 DIAGNOSIS — Z34.90 EARLY STAGE OF PREGNANCY: ICD-10-CM

## 2023-05-26 DIAGNOSIS — Z34.90 PREGNANCY, UNSPECIFIED GESTATIONAL AGE: Primary | ICD-10-CM

## 2023-05-26 DIAGNOSIS — R73.03 PRE-DIABETES: ICD-10-CM

## 2023-05-26 DIAGNOSIS — Z86.32 HISTORY OF GESTATIONAL DIABETES IN PRIOR PREGNANCY, CURRENTLY PREGNANT IN FIRST TRIMESTER: ICD-10-CM

## 2023-05-26 PROCEDURE — 3078F PR MOST RECENT DIASTOLIC BLOOD PRESSURE < 80 MM HG: ICD-10-PCS | Mod: CPTII,S$GLB,, | Performed by: NURSE PRACTITIONER

## 2023-05-26 PROCEDURE — 3074F PR MOST RECENT SYSTOLIC BLOOD PRESSURE < 130 MM HG: ICD-10-PCS | Mod: CPTII,S$GLB,, | Performed by: NURSE PRACTITIONER

## 2023-05-26 PROCEDURE — 99213 PR OFFICE/OUTPT VISIT, EST, LEVL III, 20-29 MIN: ICD-10-PCS | Mod: S$GLB,,, | Performed by: NURSE PRACTITIONER

## 2023-05-26 PROCEDURE — 3008F BODY MASS INDEX DOCD: CPT | Mod: CPTII,S$GLB,, | Performed by: NURSE PRACTITIONER

## 2023-05-26 PROCEDURE — 3078F DIAST BP <80 MM HG: CPT | Mod: CPTII,S$GLB,, | Performed by: NURSE PRACTITIONER

## 2023-05-26 PROCEDURE — 99213 OFFICE O/P EST LOW 20 MIN: CPT | Mod: S$GLB,,, | Performed by: NURSE PRACTITIONER

## 2023-05-26 PROCEDURE — 3074F SYST BP LT 130 MM HG: CPT | Mod: CPTII,S$GLB,, | Performed by: NURSE PRACTITIONER

## 2023-05-26 PROCEDURE — 99999 PR PBB SHADOW E&M-EST. PATIENT-LVL III: CPT | Mod: PBBFAC,,, | Performed by: NURSE PRACTITIONER

## 2023-05-26 PROCEDURE — 1159F PR MEDICATION LIST DOCUMENTED IN MEDICAL RECORD: ICD-10-PCS | Mod: CPTII,S$GLB,, | Performed by: NURSE PRACTITIONER

## 2023-05-26 PROCEDURE — 3008F PR BODY MASS INDEX (BMI) DOCUMENTED: ICD-10-PCS | Mod: CPTII,S$GLB,, | Performed by: NURSE PRACTITIONER

## 2023-05-26 PROCEDURE — 99999 PR PBB SHADOW E&M-EST. PATIENT-LVL III: ICD-10-PCS | Mod: PBBFAC,,, | Performed by: NURSE PRACTITIONER

## 2023-05-26 PROCEDURE — 1159F MED LIST DOCD IN RCRD: CPT | Mod: CPTII,S$GLB,, | Performed by: NURSE PRACTITIONER

## 2023-05-26 NOTE — PROGRESS NOTES
"  CC: Positive Pregnancy Test    HISTORY OF PRESENT ILLNESS:    Jane Muller is a 33 y.o. female, ,  Presents today for a routine exam complaining of amenorrhea and positive home urine pregnancy test.  Patient's last menstrual period was 2023.   She is not currently on any contraception. Starting PNV today. Reports breast tenderness. Denies vaginal bleeding and pelvic pain.    Unexpected pregnancy, IVF with prior pregnancy. History of insulin controlled gDM in prior pregnancy, pre-diabetic and has discontinued Semaglutide injections but well controlled with use. History of gastric sleeve and rLTCS.     No reported medical history for FOB. No reported personal/familial history of genetic or chromosomal issues for patient or FOB.        ROS:  GENERAL: No weight changes. No swelling. No fatigue. No fever.  CARDIOVASCULAR: No chest pain. No shortness of breath. No leg cramps.   NEUROLOGICAL: No headaches. No vision changes.  BREASTS: No pain. No lumps. No discharge.  ABDOMEN: No pain. No diarrhea. No constipation.  REPRODUCTIVE: No abnormal bleeding.   VULVA: No pain. No lesions. No itching.  VAGINA: No relaxation. No itching. No odor. No discharge. No lesions.  URINARY: No incontinence. No nocturia. No frequency. No dysuria.    MEDICATIONS AND ALLERGIES:  Reviewed        COMPREHENSIVE GYN HISTORY:  PAP History: Denies abnormal Paps.  Infection History: Denies STDs. Denies PID.  Benign History: Denies uterine fibroids. Denies ovarian cysts. Denies endometriosis. Denies other conditions.  Cancer History: Denies cervical cancer. Denies uterine cancer or hyperplasia. Denies ovarian cancer. Denies vulvar cancer or pre-cancer. Denies vaginal cancer or pre-cancer. Denies breast cancer. Denies colon cancer.  Sexual Activity History: Reports currently being sexually active  Menstrual History: None.  Contraception: None    /68   Ht 5' 5" (1.651 m)   Wt 65.1 kg (143 lb 8.3 oz)   LMP 2023   " BMI 23.88 kg/m²     PE:  AFFECT: Calm, alert and oriented X 3. Interactive during exam  GENERAL: Appears well-nourished, well-developed, in no acute distress.  HEAD: Normocephalic, atruamatic  TEETH: Good dentition.  SKIN: Normal for race, warm, & dry. No lesions or rashes.  ABDOMEN: Soft and nontender without masses or organomegally.  EXTREMITIES: No cyanosis, clubbing or edema. No calf tenderness.  LYMPH NODES: No axillary or inguinal adenopathy.    PROCEDURES:  UPT Positive  Genprobe Urine  Pap at next visit        ASSESSMENT/PLAN:  Amenorrhea  Positive urine pregnancy test (NAVIN: 1/15/24, EGA: 5w5d based on LMP)    -  Routine prenatal care    Nausea and vomiting in pregnancy    -  Education regarding lifestyle and dietary modifications    -  Advised use of B6/Unisom. Pt will notify us if no relief/worsening symptoms, will consider Zofran if needed.    History of gDM, Insulin Controlled   - Hgb A1C with initial lab work   - 1hGTT at 14 weeks GA, repeat at 24-28 GA if initially negative        1st TRIMESTER COUNSELING:   Common complaints of pregnancy  HIV and other routine prenatal tests including  genetic screening  Risk factors identified by prenatal history  Oriented to practice - discussed anticipated course of prenatal care & indications for Ultrasound  Childbirth classes/Hospital facilities   Nutrition and weight gain counseling  Toxoplasmosis precautions (Cats/Raw Meat)  Sexual activity and exercise  Environmental/Work hazards  Travel  Tobacco (Ask, Advise, Assess, Assist, and Arrange), as well as alcohol and drug use  Use of any medications (Including supplements, Vitamins, Herbs, or OTC Drugs)  Domestic violence  Seat belt use      TERATOLOGY COUNSELING:   Discussed indications and options for aneuploidy screening - pamphlets given    -  Pt miguel angel [id number], brochure given, she will contact to discuss out of pocket cost     Dating US in 3-4 weeks  FOLLOW-UP in 3-4 weeks with miguel angel Moran Dr.  Sophie for OB mgmt.        Esha Conde, NP    OB/GYN

## 2023-05-28 ENCOUNTER — PATIENT MESSAGE (OUTPATIENT)
Dept: OBSTETRICS AND GYNECOLOGY | Facility: CLINIC | Age: 33
End: 2023-05-28
Payer: COMMERCIAL

## 2023-05-31 ENCOUNTER — PATIENT MESSAGE (OUTPATIENT)
Dept: OBSTETRICS AND GYNECOLOGY | Facility: CLINIC | Age: 33
End: 2023-05-31
Payer: COMMERCIAL

## 2023-05-31 DIAGNOSIS — R11.0 NAUSEA: Primary | ICD-10-CM

## 2023-05-31 RX ORDER — ONDANSETRON 8 MG/1
8 TABLET, ORALLY DISINTEGRATING ORAL 2 TIMES DAILY
Qty: 20 TABLET | Refills: 0 | Status: SHIPPED | OUTPATIENT
Start: 2023-05-31 | End: 2023-06-01 | Stop reason: SDUPTHER

## 2023-06-12 ENCOUNTER — PROCEDURE VISIT (OUTPATIENT)
Dept: OBSTETRICS AND GYNECOLOGY | Facility: CLINIC | Age: 33
End: 2023-06-12
Payer: COMMERCIAL

## 2023-06-12 ENCOUNTER — INITIAL PRENATAL (OUTPATIENT)
Dept: OBSTETRICS AND GYNECOLOGY | Facility: CLINIC | Age: 33
End: 2023-06-12
Payer: COMMERCIAL

## 2023-06-12 VITALS
WEIGHT: 143.19 LBS | BODY MASS INDEX: 23.83 KG/M2 | DIASTOLIC BLOOD PRESSURE: 64 MMHG | SYSTOLIC BLOOD PRESSURE: 112 MMHG

## 2023-06-12 DIAGNOSIS — Z34.90 PREGNANCY, UNSPECIFIED GESTATIONAL AGE: ICD-10-CM

## 2023-06-12 DIAGNOSIS — Z86.32 HISTORY OF GESTATIONAL DIABETES: ICD-10-CM

## 2023-06-12 DIAGNOSIS — N91.2 AMENORRHEA: Primary | ICD-10-CM

## 2023-06-12 PROCEDURE — 0500F PR INITIAL PRENATAL CARE VISIT: ICD-10-PCS | Mod: S$GLB,,, | Performed by: STUDENT IN AN ORGANIZED HEALTH CARE EDUCATION/TRAINING PROGRAM

## 2023-06-12 PROCEDURE — 99999 PR PBB SHADOW E&M-EST. PATIENT-LVL II: ICD-10-PCS | Mod: PBBFAC,,, | Performed by: STUDENT IN AN ORGANIZED HEALTH CARE EDUCATION/TRAINING PROGRAM

## 2023-06-12 PROCEDURE — 76801 OB US < 14 WKS SINGLE FETUS: CPT | Mod: S$GLB,,, | Performed by: OBSTETRICS & GYNECOLOGY

## 2023-06-12 PROCEDURE — 0500F INITIAL PRENATAL CARE VISIT: CPT | Mod: S$GLB,,, | Performed by: STUDENT IN AN ORGANIZED HEALTH CARE EDUCATION/TRAINING PROGRAM

## 2023-06-12 PROCEDURE — 76801 US OB/GYN EXTENDED PROCEDURE (VIEWPOINT): ICD-10-PCS | Mod: S$GLB,,, | Performed by: OBSTETRICS & GYNECOLOGY

## 2023-06-12 PROCEDURE — 99999 PR PBB SHADOW E&M-EST. PATIENT-LVL II: CPT | Mod: PBBFAC,,, | Performed by: STUDENT IN AN ORGANIZED HEALTH CARE EDUCATION/TRAINING PROGRAM

## 2023-06-12 NOTE — PROGRESS NOTES
Needs all PNLs. Genetic testing - interested, to obtain with PNLs.   Tolerated 1hr. Will plan to perform earlier (with PNLs) and at 24-28wks. A2gDM in prior pregnancy, pre-diabetic.   PNV compliant. ER precautions. RTC 4 wks or sooner PRN.   Unexpected pregnancy, IVF with prior. History of gastric sleeve, CS x 2.   - Dated by 1TMS US  - PNLs:  ordered  - LPS 10/2019 NILM, obtain upcoming visit vs postpartum  - Genetic screening:  Ordered, to obtain with PNLs  - Anatomy US:  ordered  - Plan on early 1hr GCT, h/o A2GDM

## 2023-07-03 ENCOUNTER — PATIENT MESSAGE (OUTPATIENT)
Dept: OBSTETRICS AND GYNECOLOGY | Facility: CLINIC | Age: 33
End: 2023-07-03

## 2023-07-03 ENCOUNTER — ROUTINE PRENATAL (OUTPATIENT)
Dept: OBSTETRICS AND GYNECOLOGY | Facility: CLINIC | Age: 33
End: 2023-07-03
Payer: COMMERCIAL

## 2023-07-03 ENCOUNTER — LAB VISIT (OUTPATIENT)
Dept: LAB | Facility: OTHER | Age: 33
End: 2023-07-03
Attending: STUDENT IN AN ORGANIZED HEALTH CARE EDUCATION/TRAINING PROGRAM
Payer: COMMERCIAL

## 2023-07-03 VITALS
BODY MASS INDEX: 24.98 KG/M2 | SYSTOLIC BLOOD PRESSURE: 118 MMHG | DIASTOLIC BLOOD PRESSURE: 62 MMHG | WEIGHT: 150.13 LBS

## 2023-07-03 DIAGNOSIS — Z98.84 HISTORY OF WEIGHT LOSS SURGERY: Primary | ICD-10-CM

## 2023-07-03 DIAGNOSIS — N91.2 AMENORRHEA: ICD-10-CM

## 2023-07-03 DIAGNOSIS — Z3A.10 10 WEEKS GESTATION OF PREGNANCY: Primary | ICD-10-CM

## 2023-07-03 DIAGNOSIS — Z86.32 HISTORY OF GESTATIONAL DIABETES: ICD-10-CM

## 2023-07-03 DIAGNOSIS — O24.419 GESTATIONAL DIABETES MELLITUS (GDM) IN SECOND TRIMESTER, GESTATIONAL DIABETES METHOD OF CONTROL UNSPECIFIED: ICD-10-CM

## 2023-07-03 LAB
ALBUMIN SERPL BCP-MCNC: 3.8 G/DL (ref 3.5–5.2)
ALP SERPL-CCNC: 54 U/L (ref 55–135)
ALT SERPL W/O P-5'-P-CCNC: 11 U/L (ref 10–44)
ANION GAP SERPL CALC-SCNC: 9 MMOL/L (ref 8–16)
AST SERPL-CCNC: 15 U/L (ref 10–40)
BASOPHILS # BLD AUTO: 0.03 K/UL (ref 0–0.2)
BASOPHILS NFR BLD: 0.3 % (ref 0–1.9)
BILIRUB SERPL-MCNC: 0.4 MG/DL (ref 0.1–1)
BUN SERPL-MCNC: 11 MG/DL (ref 6–20)
CALCIUM SERPL-MCNC: 9.2 MG/DL (ref 8.7–10.5)
CHLORIDE SERPL-SCNC: 105 MMOL/L (ref 95–110)
CO2 SERPL-SCNC: 21 MMOL/L (ref 23–29)
CREAT SERPL-MCNC: 0.7 MG/DL (ref 0.5–1.4)
DIFFERENTIAL METHOD: ABNORMAL
EOSINOPHIL # BLD AUTO: 0.1 K/UL (ref 0–0.5)
EOSINOPHIL NFR BLD: 1 % (ref 0–8)
ERYTHROCYTE [DISTWIDTH] IN BLOOD BY AUTOMATED COUNT: 13.2 % (ref 11.5–14.5)
EST. GFR  (NO RACE VARIABLE): >60 ML/MIN/1.73 M^2
GLUCOSE SERPL-MCNC: 158 MG/DL (ref 70–140)
GLUCOSE SERPL-MCNC: 166 MG/DL (ref 70–110)
HBV SURFACE AG SERPL QL IA: NORMAL
HCT VFR BLD AUTO: 35.1 % (ref 37–48.5)
HCV AB SERPL QL IA: NORMAL
HGB BLD-MCNC: 11.1 G/DL (ref 12–16)
HIV 1+2 AB+HIV1 P24 AG SERPL QL IA: NORMAL
IMM GRANULOCYTES # BLD AUTO: 0.05 K/UL (ref 0–0.04)
IMM GRANULOCYTES NFR BLD AUTO: 0.6 % (ref 0–0.5)
LYMPHOCYTES # BLD AUTO: 2.1 K/UL (ref 1–4.8)
LYMPHOCYTES NFR BLD: 23.5 % (ref 18–48)
MCH RBC QN AUTO: 28.3 PG (ref 27–31)
MCHC RBC AUTO-ENTMCNC: 31.6 G/DL (ref 32–36)
MCV RBC AUTO: 90 FL (ref 82–98)
MONOCYTES # BLD AUTO: 0.6 K/UL (ref 0.3–1)
MONOCYTES NFR BLD: 6.3 % (ref 4–15)
NEUTROPHILS # BLD AUTO: 6.2 K/UL (ref 1.8–7.7)
NEUTROPHILS NFR BLD: 68.3 % (ref 38–73)
NRBC BLD-RTO: 0 /100 WBC
PLATELET # BLD AUTO: 247 K/UL (ref 150–450)
PMV BLD AUTO: 11.5 FL (ref 9.2–12.9)
POTASSIUM SERPL-SCNC: 3.7 MMOL/L (ref 3.5–5.1)
PROT SERPL-MCNC: 7.6 G/DL (ref 6–8.4)
RBC # BLD AUTO: 3.92 M/UL (ref 4–5.4)
RPR SER QL: NORMAL
SODIUM SERPL-SCNC: 135 MMOL/L (ref 136–145)
WBC # BLD AUTO: 9.09 K/UL (ref 3.9–12.7)

## 2023-07-03 PROCEDURE — 87389 HIV-1 AG W/HIV-1&-2 AB AG IA: CPT | Performed by: STUDENT IN AN ORGANIZED HEALTH CARE EDUCATION/TRAINING PROGRAM

## 2023-07-03 PROCEDURE — 86787 VARICELLA-ZOSTER ANTIBODY: CPT | Performed by: STUDENT IN AN ORGANIZED HEALTH CARE EDUCATION/TRAINING PROGRAM

## 2023-07-03 PROCEDURE — 99999 PR PBB SHADOW E&M-EST. PATIENT-LVL II: ICD-10-PCS | Mod: PBBFAC,,, | Performed by: STUDENT IN AN ORGANIZED HEALTH CARE EDUCATION/TRAINING PROGRAM

## 2023-07-03 PROCEDURE — 0502F PR SUBSEQUENT PRENATAL CARE: ICD-10-PCS | Mod: CPTII,S$GLB,, | Performed by: STUDENT IN AN ORGANIZED HEALTH CARE EDUCATION/TRAINING PROGRAM

## 2023-07-03 PROCEDURE — 36415 COLL VENOUS BLD VENIPUNCTURE: CPT | Performed by: STUDENT IN AN ORGANIZED HEALTH CARE EDUCATION/TRAINING PROGRAM

## 2023-07-03 PROCEDURE — 87340 HEPATITIS B SURFACE AG IA: CPT | Performed by: STUDENT IN AN ORGANIZED HEALTH CARE EDUCATION/TRAINING PROGRAM

## 2023-07-03 PROCEDURE — 82950 GLUCOSE TEST: CPT | Performed by: STUDENT IN AN ORGANIZED HEALTH CARE EDUCATION/TRAINING PROGRAM

## 2023-07-03 PROCEDURE — 80053 COMPREHEN METABOLIC PANEL: CPT | Performed by: STUDENT IN AN ORGANIZED HEALTH CARE EDUCATION/TRAINING PROGRAM

## 2023-07-03 PROCEDURE — 86803 HEPATITIS C AB TEST: CPT | Performed by: STUDENT IN AN ORGANIZED HEALTH CARE EDUCATION/TRAINING PROGRAM

## 2023-07-03 PROCEDURE — 86592 SYPHILIS TEST NON-TREP QUAL: CPT | Performed by: STUDENT IN AN ORGANIZED HEALTH CARE EDUCATION/TRAINING PROGRAM

## 2023-07-03 PROCEDURE — 85025 COMPLETE CBC W/AUTO DIFF WBC: CPT | Performed by: STUDENT IN AN ORGANIZED HEALTH CARE EDUCATION/TRAINING PROGRAM

## 2023-07-03 PROCEDURE — 0502F SUBSEQUENT PRENATAL CARE: CPT | Mod: CPTII,S$GLB,, | Performed by: STUDENT IN AN ORGANIZED HEALTH CARE EDUCATION/TRAINING PROGRAM

## 2023-07-03 PROCEDURE — 99999 PR PBB SHADOW E&M-EST. PATIENT-LVL II: CPT | Mod: PBBFAC,,, | Performed by: STUDENT IN AN ORGANIZED HEALTH CARE EDUCATION/TRAINING PROGRAM

## 2023-07-03 PROCEDURE — 86762 RUBELLA ANTIBODY: CPT | Performed by: STUDENT IN AN ORGANIZED HEALTH CARE EDUCATION/TRAINING PROGRAM

## 2023-07-03 NOTE — PROGRESS NOTES
at 10w4d   Doing well overall, -LOF/VB/CTX.   Some nausea in AM.   PNLs, cfDNA, early 1hr GCT today. PNV compliant. ConnectedMOM ordered. Anatomy US previously ordered. VScan today with +FM.  ER precautions. RTC 4wks or sooner PRN.     - Dated by 1TMS US  - PNLs:  ordered  - LPS 10/2019 NILM, obtain upcoming visit vs postpartum  - Genetic screening:  ordered, to obtain with PNLs  - Anatomy US:  ordered  - Plan on early 1hr GCT, today; h/o A2GDM

## 2023-07-05 ENCOUNTER — TELEPHONE (OUTPATIENT)
Dept: OBSTETRICS AND GYNECOLOGY | Facility: CLINIC | Age: 33
End: 2023-07-05
Payer: COMMERCIAL

## 2023-07-05 ENCOUNTER — PATIENT MESSAGE (OUTPATIENT)
Dept: OBSTETRICS AND GYNECOLOGY | Facility: CLINIC | Age: 33
End: 2023-07-05
Payer: COMMERCIAL

## 2023-07-05 DIAGNOSIS — O24.410 DIET CONTROLLED GESTATIONAL DIABETES MELLITUS (GDM) IN SECOND TRIMESTER: Primary | ICD-10-CM

## 2023-07-05 LAB
RUBV IGG SER-ACNC: 15.2 IU/ML
RUBV IGG SER-IMP: REACTIVE
VARICELLA INTERPRETATION: POSITIVE
VARICELLA ZOSTER IGG: 625 AU/ML

## 2023-07-05 RX ORDER — LANCETS
1 EACH MISCELLANEOUS 4 TIMES DAILY
Qty: 120 EACH | Refills: 3 | Status: SHIPPED | OUTPATIENT
Start: 2023-07-05 | End: 2023-08-04

## 2023-07-05 NOTE — TELEPHONE ENCOUNTER
Called pt to follow up in regards to scheduling virtual visit with Dr. Barrios next week     No answer  Left detailed voice message as well as call back number    Sent pt portal message    ND

## 2023-07-05 NOTE — TELEPHONE ENCOUNTER
----- Message from Zuleyka Barrios MD sent at 7/5/2023  8:46 AM CDT -----  Please schedule a virtual apt with me next week (July 10-14th) to discuss GDM.  Thank you!

## 2023-07-06 ENCOUNTER — PATIENT MESSAGE (OUTPATIENT)
Dept: OBSTETRICS AND GYNECOLOGY | Facility: CLINIC | Age: 33
End: 2023-07-06
Payer: COMMERCIAL

## 2023-07-06 ENCOUNTER — TELEPHONE (OUTPATIENT)
Dept: ADMINISTRATIVE | Facility: OTHER | Age: 33
End: 2023-07-06
Payer: COMMERCIAL

## 2023-07-06 NOTE — TELEPHONE ENCOUNTER
Spoke to patient who declined scheduling Diabetes Education appointment as she states this is her third pregnancy, and she has had gestational diabetes for each one. Feels she does not need this appointment.

## 2023-07-13 ENCOUNTER — TELEPHONE (OUTPATIENT)
Dept: OBSTETRICS AND GYNECOLOGY | Facility: CLINIC | Age: 33
End: 2023-07-13
Payer: COMMERCIAL

## 2023-07-13 ENCOUNTER — TELEPHONE (OUTPATIENT)
Dept: OBSTETRICS AND GYNECOLOGY | Facility: CLINIC | Age: 33
End: 2023-07-13

## 2023-07-13 NOTE — TELEPHONE ENCOUNTER
7/13/2023 2:49PM. Called pt to inform of M21 results.    Patient did not answer. Left v/m with office call back number.    NIKOLAY

## 2023-07-13 NOTE — TELEPHONE ENCOUNTER
Attempted to contact Jane Mann Renzo today, 7/13/2023, at approximately 2:31 PM.  There was no answer.   No VM identifier.  Left a message with my name and callback number.          Attempted to contact Jane Mann Renzo today, 7/13/2023, at approximately 3:55 PM.  There was no answer.   No VM identifier.  Left a message with my name and callback number.                Will need to discuss the below:     Results:      1 HR      Assessment/Plan:      11 weeks gestation of pregnancy     Gestational Diabetes  -- Based on elevated 1hr GCT + patient history of GDM in last two pregnancies; patient elects to forego diagnostic 3hr GTT and assume GDM diagnosis

## 2023-07-14 ENCOUNTER — PATIENT MESSAGE (OUTPATIENT)
Dept: OBSTETRICS AND GYNECOLOGY | Facility: CLINIC | Age: 33
End: 2023-07-14
Payer: COMMERCIAL

## 2023-07-24 ENCOUNTER — PATIENT MESSAGE (OUTPATIENT)
Dept: OBSTETRICS AND GYNECOLOGY | Facility: CLINIC | Age: 33
End: 2023-07-24
Payer: COMMERCIAL

## 2023-07-24 ENCOUNTER — TELEPHONE (OUTPATIENT)
Dept: OBSTETRICS AND GYNECOLOGY | Facility: CLINIC | Age: 33
End: 2023-07-24
Payer: COMMERCIAL

## 2023-07-25 NOTE — TELEPHONE ENCOUNTER
Glucose log from 7/10 - 7/24 reviewed. Compliant with 4 x daily accuchecks. A1GDM.     Fasting average 86 (1 value outside of goal)  Post-Breakfast average 101 (1 value outside of goal)  Post-Lunch average 103 (0 values outside of goal)  Post-Dinner average 108 (2 values outside of goal)

## 2023-07-26 ENCOUNTER — PATIENT MESSAGE (OUTPATIENT)
Dept: OBSTETRICS AND GYNECOLOGY | Facility: CLINIC | Age: 33
End: 2023-07-26
Payer: COMMERCIAL

## 2023-08-01 NOTE — TELEPHONE ENCOUNTER
Called pt in regards to the pt portal message.      Informed pt of GptopruS21 results  Declined to know gender    No further questions at this time    Confirmed appt tomorrow at 9:20 am    ND

## 2023-08-02 ENCOUNTER — ROUTINE PRENATAL (OUTPATIENT)
Dept: OBSTETRICS AND GYNECOLOGY | Facility: CLINIC | Age: 33
End: 2023-08-02
Payer: COMMERCIAL

## 2023-08-02 VITALS
BODY MASS INDEX: 25.94 KG/M2 | SYSTOLIC BLOOD PRESSURE: 104 MMHG | WEIGHT: 155.88 LBS | DIASTOLIC BLOOD PRESSURE: 62 MMHG

## 2023-08-02 DIAGNOSIS — Z3A.14 14 WEEKS GESTATION OF PREGNANCY: Primary | ICD-10-CM

## 2023-08-02 PROCEDURE — 99999 PR PBB SHADOW E&M-EST. PATIENT-LVL III: ICD-10-PCS | Mod: PBBFAC,,, | Performed by: NURSE PRACTITIONER

## 2023-08-02 PROCEDURE — 0502F SUBSEQUENT PRENATAL CARE: CPT | Mod: CPTII,S$GLB,, | Performed by: NURSE PRACTITIONER

## 2023-08-02 PROCEDURE — 0502F PR SUBSEQUENT PRENATAL CARE: ICD-10-PCS | Mod: CPTII,S$GLB,, | Performed by: NURSE PRACTITIONER

## 2023-08-02 PROCEDURE — 99999 PR PBB SHADOW E&M-EST. PATIENT-LVL III: CPT | Mod: PBBFAC,,, | Performed by: NURSE PRACTITIONER

## 2023-08-02 NOTE — PROGRESS NOTES
Presents at 14w6d for ELIZABETH. Doing well. All blood glucose are WNL. Anatomy scan scheduled. FU in 4 weeks for ELIZABETH.

## 2023-08-04 ENCOUNTER — PATIENT MESSAGE (OUTPATIENT)
Dept: OBSTETRICS AND GYNECOLOGY | Facility: CLINIC | Age: 33
End: 2023-08-04
Payer: COMMERCIAL

## 2023-08-10 ENCOUNTER — PATIENT MESSAGE (OUTPATIENT)
Dept: OTHER | Facility: OTHER | Age: 33
End: 2023-08-10
Payer: COMMERCIAL

## 2023-08-17 ENCOUNTER — PATIENT MESSAGE (OUTPATIENT)
Dept: OTHER | Facility: OTHER | Age: 33
End: 2023-08-17
Payer: COMMERCIAL

## 2023-08-18 ENCOUNTER — PATIENT MESSAGE (OUTPATIENT)
Dept: OBSTETRICS AND GYNECOLOGY | Facility: CLINIC | Age: 33
End: 2023-08-18
Payer: COMMERCIAL

## 2023-08-23 ENCOUNTER — TELEPHONE (OUTPATIENT)
Dept: OBSTETRICS AND GYNECOLOGY | Facility: CLINIC | Age: 33
End: 2023-08-23
Payer: COMMERCIAL

## 2023-08-23 ENCOUNTER — ROUTINE PRENATAL (OUTPATIENT)
Dept: OBSTETRICS AND GYNECOLOGY | Facility: CLINIC | Age: 33
End: 2023-08-23
Payer: COMMERCIAL

## 2023-08-23 VITALS
BODY MASS INDEX: 26.78 KG/M2 | DIASTOLIC BLOOD PRESSURE: 64 MMHG | SYSTOLIC BLOOD PRESSURE: 112 MMHG | WEIGHT: 160.94 LBS

## 2023-08-23 DIAGNOSIS — Z3A.17 17 WEEKS GESTATION OF PREGNANCY: Primary | ICD-10-CM

## 2023-08-23 PROCEDURE — 0502F SUBSEQUENT PRENATAL CARE: CPT | Mod: CPTII,S$GLB,, | Performed by: STUDENT IN AN ORGANIZED HEALTH CARE EDUCATION/TRAINING PROGRAM

## 2023-08-23 PROCEDURE — 0502F PR SUBSEQUENT PRENATAL CARE: ICD-10-PCS | Mod: CPTII,S$GLB,, | Performed by: STUDENT IN AN ORGANIZED HEALTH CARE EDUCATION/TRAINING PROGRAM

## 2023-08-23 PROCEDURE — 99999 PR PBB SHADOW E&M-EST. PATIENT-LVL II: ICD-10-PCS | Mod: PBBFAC,,, | Performed by: STUDENT IN AN ORGANIZED HEALTH CARE EDUCATION/TRAINING PROGRAM

## 2023-08-23 PROCEDURE — 99999 PR PBB SHADOW E&M-EST. PATIENT-LVL II: CPT | Mod: PBBFAC,,, | Performed by: STUDENT IN AN ORGANIZED HEALTH CARE EDUCATION/TRAINING PROGRAM

## 2023-08-23 NOTE — PROGRESS NOTES
at 17w6d   Doing well overall, -LOF/VB/CTX.   Presents today for concern of no FM yet, had felt at this point in prior pregnancies.   Vscan with ++FM, +CM, visualized by patient. Anterior placenta. FHR 150bpm, heard by patient. Pt reassured. RTC as scheduled.

## 2023-08-23 NOTE — TELEPHONE ENCOUNTER
17/18 week ob patient calling states she haven't felt no movement of the baby. Patient wants to come in and check the heartbeat. Patient is okay to see anytime at any location.     Pt scheduled for 1115 with Dr Sophie Barrios spoke with pt.

## 2023-08-31 ENCOUNTER — PROCEDURE VISIT (OUTPATIENT)
Dept: MATERNAL FETAL MEDICINE | Facility: CLINIC | Age: 33
End: 2023-08-31
Payer: COMMERCIAL

## 2023-08-31 DIAGNOSIS — N91.2 AMENORRHEA: ICD-10-CM

## 2023-08-31 PROCEDURE — 76805 US MFM PROCEDURE (VIEWPOINT): ICD-10-PCS | Mod: S$GLB,,, | Performed by: OBSTETRICS & GYNECOLOGY

## 2023-08-31 PROCEDURE — 76805 OB US >/= 14 WKS SNGL FETUS: CPT | Mod: S$GLB,,, | Performed by: OBSTETRICS & GYNECOLOGY

## 2023-09-01 ENCOUNTER — ROUTINE PRENATAL (OUTPATIENT)
Dept: OBSTETRICS AND GYNECOLOGY | Facility: CLINIC | Age: 33
End: 2023-09-01
Payer: COMMERCIAL

## 2023-09-01 ENCOUNTER — TELEPHONE (OUTPATIENT)
Dept: OBSTETRICS AND GYNECOLOGY | Facility: CLINIC | Age: 33
End: 2023-09-01

## 2023-09-01 VITALS
WEIGHT: 164.25 LBS | SYSTOLIC BLOOD PRESSURE: 118 MMHG | DIASTOLIC BLOOD PRESSURE: 82 MMHG | BODY MASS INDEX: 27.33 KG/M2

## 2023-09-01 DIAGNOSIS — Z98.891 STATUS POST REPEAT LOW TRANSVERSE CESAREAN SECTION: Primary | ICD-10-CM

## 2023-09-01 DIAGNOSIS — Z3A.19 19 WEEKS GESTATION OF PREGNANCY: Primary | ICD-10-CM

## 2023-09-01 PROCEDURE — 0502F PR SUBSEQUENT PRENATAL CARE: ICD-10-PCS | Mod: CPTII,S$GLB,, | Performed by: STUDENT IN AN ORGANIZED HEALTH CARE EDUCATION/TRAINING PROGRAM

## 2023-09-01 PROCEDURE — 99999 PR PBB SHADOW E&M-EST. PATIENT-LVL III: CPT | Mod: PBBFAC,,, | Performed by: STUDENT IN AN ORGANIZED HEALTH CARE EDUCATION/TRAINING PROGRAM

## 2023-09-01 PROCEDURE — 0502F SUBSEQUENT PRENATAL CARE: CPT | Mod: CPTII,S$GLB,, | Performed by: STUDENT IN AN ORGANIZED HEALTH CARE EDUCATION/TRAINING PROGRAM

## 2023-09-01 PROCEDURE — 99999 PR PBB SHADOW E&M-EST. PATIENT-LVL III: ICD-10-PCS | Mod: PBBFAC,,, | Performed by: STUDENT IN AN ORGANIZED HEALTH CARE EDUCATION/TRAINING PROGRAM

## 2023-09-01 NOTE — PROGRESS NOTES
at 19w1d   Doing well overall, -LOF/VB/CTX. -FM. Visualized FM with anatomy US yesterday. Reviewed.   No fevers, dysuria, or hematuria. Send urine testing. RTC 4wks or sooner PRN. [facility] ED precautions. UpcominTMS labs CBC, iron panel.    IUP   - Dated by 1TMS US  - PNLs:  H&H , HepCAb/HepBSAg/HIV/RPR NR  - LPS:  NILM, 10/2019; need to update - plan on postpartum  - Genetic screening:  cfDNA WNL [uploaded into Media]  - Anatomy US:  , no fetal structural malformations identified, placenta anterior  - Early 1 hr (h/o A2GDM):  158, pt elected to assume GDM diagnosis without 3hr  Compliant with accuchecks  Glucose log - to send me, reports numbers have all been at goal  Continue diabetic diet, exercise  Reviewed risks   - 24 will request rCS, reviewed this may change pending A1GDM vs A2GDM/control

## 2023-09-01 NOTE — TELEPHONE ENCOUNTER
----- Message from Zuleyka Barrios MD sent at 2023 10:21 AM CDT -----  Regarding: CS    Please schedule for repeat Csection    34yo  who will be 39wks on 24, 7AM, with A1GDM, history of gastric weight loss surgery.

## 2023-09-07 ENCOUNTER — PATIENT MESSAGE (OUTPATIENT)
Dept: OTHER | Facility: OTHER | Age: 33
End: 2023-09-07
Payer: COMMERCIAL

## 2023-09-11 ENCOUNTER — PATIENT MESSAGE (OUTPATIENT)
Dept: OBSTETRICS AND GYNECOLOGY | Facility: CLINIC | Age: 33
End: 2023-09-11
Payer: COMMERCIAL

## 2023-09-12 ENCOUNTER — TELEPHONE (OUTPATIENT)
Dept: OBSTETRICS AND GYNECOLOGY | Facility: CLINIC | Age: 33
End: 2023-09-12
Payer: COMMERCIAL

## 2023-09-12 NOTE — TELEPHONE ENCOUNTER
Glucose log reviewed.    Fasting average 85 (0/10 values above goal), breakfast 100 (0/10 values above goal), lunch 107 (0/10 values above goal), dinner 115 (1/10 values above goal).  Continue A1GDM management at this time.

## 2023-09-13 ENCOUNTER — ROUTINE PRENATAL (OUTPATIENT)
Dept: OBSTETRICS AND GYNECOLOGY | Facility: CLINIC | Age: 33
End: 2023-09-13
Payer: COMMERCIAL

## 2023-09-13 VITALS
DIASTOLIC BLOOD PRESSURE: 64 MMHG | BODY MASS INDEX: 28.36 KG/M2 | SYSTOLIC BLOOD PRESSURE: 108 MMHG | WEIGHT: 170.44 LBS

## 2023-09-13 DIAGNOSIS — Z3A.20 20 WEEKS GESTATION OF PREGNANCY: Primary | ICD-10-CM

## 2023-09-13 PROCEDURE — 0502F SUBSEQUENT PRENATAL CARE: CPT | Mod: CPTII,S$GLB,, | Performed by: NURSE PRACTITIONER

## 2023-09-13 PROCEDURE — 0502F PR SUBSEQUENT PRENATAL CARE: ICD-10-PCS | Mod: CPTII,S$GLB,, | Performed by: NURSE PRACTITIONER

## 2023-09-13 PROCEDURE — 99999 PR PBB SHADOW E&M-EST. PATIENT-LVL II: ICD-10-PCS | Mod: PBBFAC,,, | Performed by: NURSE PRACTITIONER

## 2023-09-13 PROCEDURE — 99999 PR PBB SHADOW E&M-EST. PATIENT-LVL II: CPT | Mod: PBBFAC,,, | Performed by: NURSE PRACTITIONER

## 2023-09-14 NOTE — PROGRESS NOTES
Presents at 20w6d for ELIZABETH. Concern for absence of FM at 20 weeks, reassurance given. FHT+. FU in 4 weeks for ELIZABETH.

## 2023-09-27 ENCOUNTER — LAB VISIT (OUTPATIENT)
Dept: LAB | Facility: HOSPITAL | Age: 33
End: 2023-09-27
Attending: STUDENT IN AN ORGANIZED HEALTH CARE EDUCATION/TRAINING PROGRAM
Payer: COMMERCIAL

## 2023-09-27 ENCOUNTER — PATIENT MESSAGE (OUTPATIENT)
Dept: OBSTETRICS AND GYNECOLOGY | Facility: CLINIC | Age: 33
End: 2023-09-27

## 2023-09-27 ENCOUNTER — ROUTINE PRENATAL (OUTPATIENT)
Dept: OBSTETRICS AND GYNECOLOGY | Facility: CLINIC | Age: 33
End: 2023-09-27
Payer: COMMERCIAL

## 2023-09-27 VITALS
SYSTOLIC BLOOD PRESSURE: 120 MMHG | BODY MASS INDEX: 28.62 KG/M2 | WEIGHT: 171.94 LBS | DIASTOLIC BLOOD PRESSURE: 68 MMHG

## 2023-09-27 DIAGNOSIS — Z3A.19 19 WEEKS GESTATION OF PREGNANCY: ICD-10-CM

## 2023-09-27 DIAGNOSIS — Z3A.22 22 WEEKS GESTATION OF PREGNANCY: Primary | ICD-10-CM

## 2023-09-27 LAB
BASOPHILS # BLD AUTO: 0.03 K/UL (ref 0–0.2)
BASOPHILS NFR BLD: 0.3 % (ref 0–1.9)
DIFFERENTIAL METHOD: ABNORMAL
EOSINOPHIL # BLD AUTO: 0.1 K/UL (ref 0–0.5)
EOSINOPHIL NFR BLD: 1.3 % (ref 0–8)
ERYTHROCYTE [DISTWIDTH] IN BLOOD BY AUTOMATED COUNT: 13.1 % (ref 11.5–14.5)
HCT VFR BLD AUTO: 31.4 % (ref 37–48.5)
HGB BLD-MCNC: 9.5 G/DL (ref 12–16)
IMM GRANULOCYTES # BLD AUTO: 0.07 K/UL (ref 0–0.04)
IMM GRANULOCYTES NFR BLD AUTO: 0.6 % (ref 0–0.5)
IRON SERPL-MCNC: 25 UG/DL (ref 30–160)
LYMPHOCYTES # BLD AUTO: 2.4 K/UL (ref 1–4.8)
LYMPHOCYTES NFR BLD: 21.8 % (ref 18–48)
MCH RBC QN AUTO: 26.4 PG (ref 27–31)
MCHC RBC AUTO-ENTMCNC: 30.3 G/DL (ref 32–36)
MCV RBC AUTO: 87 FL (ref 82–98)
MONOCYTES # BLD AUTO: 0.9 K/UL (ref 0.3–1)
MONOCYTES NFR BLD: 7.9 % (ref 4–15)
NEUTROPHILS # BLD AUTO: 7.6 K/UL (ref 1.8–7.7)
NEUTROPHILS NFR BLD: 68.1 % (ref 38–73)
NRBC BLD-RTO: 0 /100 WBC
PLATELET # BLD AUTO: 258 K/UL (ref 150–450)
PMV BLD AUTO: 13.7 FL (ref 9.2–12.9)
RBC # BLD AUTO: 3.6 M/UL (ref 4–5.4)
SATURATED IRON: 4 % (ref 20–50)
TOTAL IRON BINDING CAPACITY: 660 UG/DL (ref 250–450)
TRANSFERRIN SERPL-MCNC: 446 MG/DL (ref 200–375)
WBC # BLD AUTO: 11.09 K/UL (ref 3.9–12.7)

## 2023-09-27 PROCEDURE — 87491 CHLMYD TRACH DNA AMP PROBE: CPT | Performed by: STUDENT IN AN ORGANIZED HEALTH CARE EDUCATION/TRAINING PROGRAM

## 2023-09-27 PROCEDURE — 83540 ASSAY OF IRON: CPT | Performed by: STUDENT IN AN ORGANIZED HEALTH CARE EDUCATION/TRAINING PROGRAM

## 2023-09-27 PROCEDURE — 99999 PR PBB SHADOW E&M-EST. PATIENT-LVL III: ICD-10-PCS | Mod: PBBFAC,,, | Performed by: STUDENT IN AN ORGANIZED HEALTH CARE EDUCATION/TRAINING PROGRAM

## 2023-09-27 PROCEDURE — 84466 ASSAY OF TRANSFERRIN: CPT | Performed by: STUDENT IN AN ORGANIZED HEALTH CARE EDUCATION/TRAINING PROGRAM

## 2023-09-27 PROCEDURE — 85025 COMPLETE CBC W/AUTO DIFF WBC: CPT | Performed by: STUDENT IN AN ORGANIZED HEALTH CARE EDUCATION/TRAINING PROGRAM

## 2023-09-27 PROCEDURE — 99999 PR PBB SHADOW E&M-EST. PATIENT-LVL III: CPT | Mod: PBBFAC,,, | Performed by: STUDENT IN AN ORGANIZED HEALTH CARE EDUCATION/TRAINING PROGRAM

## 2023-09-27 PROCEDURE — 0502F PR SUBSEQUENT PRENATAL CARE: ICD-10-PCS | Mod: CPTII,S$GLB,, | Performed by: STUDENT IN AN ORGANIZED HEALTH CARE EDUCATION/TRAINING PROGRAM

## 2023-09-27 PROCEDURE — 0502F SUBSEQUENT PRENATAL CARE: CPT | Mod: CPTII,S$GLB,, | Performed by: STUDENT IN AN ORGANIZED HEALTH CARE EDUCATION/TRAINING PROGRAM

## 2023-09-27 PROCEDURE — 36415 COLL VENOUS BLD VENIPUNCTURE: CPT | Performed by: STUDENT IN AN ORGANIZED HEALTH CARE EDUCATION/TRAINING PROGRAM

## 2023-09-27 PROCEDURE — 87086 URINE CULTURE/COLONY COUNT: CPT | Performed by: STUDENT IN AN ORGANIZED HEALTH CARE EDUCATION/TRAINING PROGRAM

## 2023-09-27 NOTE — PROGRESS NOTES
at 22w6d   Doing well overall, +FM, -LOF/VB/CTX. Vscan with +FM, +CM.  2TMS labs today with iron. Urine testing. Reviewed associated risks of GDM. RTC 4wks or sooner PRN. [facility] ED precautions.    IUP   - Dated by 1TMS US  - PNLs:  H&H , HepCAb/HepBSAg/HIV/RPR NR  - LPS:  NILM, 10/2019; need to update - plan on postpartum  - Genetic screening:  cfDNA WNL [uploaded into Media]  - Anatomy US:  , no fetal structural malformations identified, placenta anterior  - Early 1 hr (h/o A2GDM):  158, pt elected to assume GDM diagnosis without 3hr  Compliant with accuchecks  Continue diabetic diet, exercise  Reviewed associated risks of GDM   Glucose log ( - ):  fasting average 85.5 (0/10 values above goal), breakfast 106 (0/10 values above goal), lunch 115.7 (2/10 values above goal), dinner 111 (2/9 values above goal)  - 24 will request rCS, reviewed this may change pending A1GDM vs A2GDM/control

## 2023-09-28 LAB
BACTERIA UR CULT: NORMAL
C TRACH DNA SPEC QL NAA+PROBE: NOT DETECTED
N GONORRHOEA DNA SPEC QL NAA+PROBE: NOT DETECTED

## 2023-10-05 ENCOUNTER — PATIENT MESSAGE (OUTPATIENT)
Dept: OTHER | Facility: OTHER | Age: 33
End: 2023-10-05
Payer: COMMERCIAL

## 2023-10-17 ENCOUNTER — TELEPHONE (OUTPATIENT)
Dept: OBSTETRICS AND GYNECOLOGY | Facility: CLINIC | Age: 33
End: 2023-10-17
Payer: COMMERCIAL

## 2023-10-17 ENCOUNTER — PATIENT MESSAGE (OUTPATIENT)
Dept: OBSTETRICS AND GYNECOLOGY | Facility: CLINIC | Age: 33
End: 2023-10-17
Payer: COMMERCIAL

## 2023-10-17 NOTE — TELEPHONE ENCOUNTER
Received GDM log.    Fasting average 89 (0/9 values above goal)  Post breakfast average 107 (2/9 values above goal)  Post lunch average 110 (2/9 values above goal)  Post dinner average 115 (2/8 values above goal)    Fasting average increased compared to prior logs.

## 2023-10-19 ENCOUNTER — PATIENT MESSAGE (OUTPATIENT)
Dept: OTHER | Facility: OTHER | Age: 33
End: 2023-10-19
Payer: COMMERCIAL

## 2023-10-27 ENCOUNTER — ROUTINE PRENATAL (OUTPATIENT)
Dept: OBSTETRICS AND GYNECOLOGY | Facility: CLINIC | Age: 33
End: 2023-10-27
Payer: COMMERCIAL

## 2023-10-27 VITALS
WEIGHT: 178.56 LBS | BODY MASS INDEX: 29.72 KG/M2 | DIASTOLIC BLOOD PRESSURE: 70 MMHG | SYSTOLIC BLOOD PRESSURE: 110 MMHG

## 2023-10-27 DIAGNOSIS — Z3A.27 27 WEEKS GESTATION OF PREGNANCY: Primary | ICD-10-CM

## 2023-10-27 PROCEDURE — 99999 PR PBB SHADOW E&M-EST. PATIENT-LVL III: CPT | Mod: PBBFAC,,, | Performed by: STUDENT IN AN ORGANIZED HEALTH CARE EDUCATION/TRAINING PROGRAM

## 2023-10-27 PROCEDURE — 0502F SUBSEQUENT PRENATAL CARE: CPT | Mod: CPTII,S$GLB,, | Performed by: STUDENT IN AN ORGANIZED HEALTH CARE EDUCATION/TRAINING PROGRAM

## 2023-10-27 PROCEDURE — 99999 PR PBB SHADOW E&M-EST. PATIENT-LVL III: ICD-10-PCS | Mod: PBBFAC,,, | Performed by: STUDENT IN AN ORGANIZED HEALTH CARE EDUCATION/TRAINING PROGRAM

## 2023-10-27 PROCEDURE — 0502F PR SUBSEQUENT PRENATAL CARE: ICD-10-PCS | Mod: CPTII,S$GLB,, | Performed by: STUDENT IN AN ORGANIZED HEALTH CARE EDUCATION/TRAINING PROGRAM

## 2023-10-27 NOTE — PROGRESS NOTES
at 27w1d   Doing well overall, +FM, -LOF/VB/CTX.   Vscan with +FM, +CM. Iron, PNV compliant. Planning to get flu shot with son. Requests TDAP next visit. Compliant with GDM testing. [facility] ED precautions. RTC 2wks or sooner PRN.    IUP   - Dated by 1TMS US  - PNLs:  H&H , HepCAb/HepBSAg/HIV/RPR NR  - LPS:  NILM, 10/2019; need to update - plan on postpartum  - Genetic screening:  cfDNA WNL [uploaded into Media]  - Anatomy US:  , no fetal structural malformations identified, placenta anterior  - Early 1 hr (h/o A2GDM):  158, pt elected to assume GDM diagnosis without 3hr  Compliant with accuchecks  Continue diabetic diet, exercise  Reviewed associated risks of GDM   - 24 will request rCS, reviewed this may change pending A1GDM vs A2GDM/control    Fasting average 88.8 (0/10 above goal)  Breakfast average 113.7 (1/10 above goal)  Lunch average 114 (1/9 above goal)  Dinner average 119 (1/9 above goal)

## 2023-10-30 ENCOUNTER — HOSPITAL ENCOUNTER (EMERGENCY)
Facility: OTHER | Age: 33
Discharge: HOME | End: 2023-10-30
Attending: OBSTETRICS & GYNECOLOGY
Payer: COMMERCIAL

## 2023-10-30 VITALS
RESPIRATION RATE: 18 BRPM | TEMPERATURE: 98 F | OXYGEN SATURATION: 99 % | HEART RATE: 67 BPM | SYSTOLIC BLOOD PRESSURE: 111 MMHG | DIASTOLIC BLOOD PRESSURE: 58 MMHG

## 2023-10-30 DIAGNOSIS — Z3A.27 27 WEEKS GESTATION OF PREGNANCY: ICD-10-CM

## 2023-10-30 DIAGNOSIS — O36.8120 DECREASED FETAL MOVEMENTS IN SECOND TRIMESTER, SINGLE OR UNSPECIFIED FETUS: Primary | ICD-10-CM

## 2023-10-30 PROCEDURE — 99284 PR EMERGENCY DEPT VISIT,LEVEL IV: ICD-10-PCS | Mod: 25,,, | Performed by: OBSTETRICS & GYNECOLOGY

## 2023-10-30 PROCEDURE — 59025 PR FETAL 2N-STRESS TEST: ICD-10-PCS | Mod: 26,,, | Performed by: OBSTETRICS & GYNECOLOGY

## 2023-10-30 PROCEDURE — 59025 FETAL NON-STRESS TEST: CPT

## 2023-10-30 PROCEDURE — 99284 EMERGENCY DEPT VISIT MOD MDM: CPT

## 2023-10-30 PROCEDURE — 99284 EMERGENCY DEPT VISIT MOD MDM: CPT | Mod: 25,,, | Performed by: OBSTETRICS & GYNECOLOGY

## 2023-10-30 PROCEDURE — 59025 FETAL NON-STRESS TEST: CPT | Mod: 26,,, | Performed by: OBSTETRICS & GYNECOLOGY

## 2023-10-30 NOTE — ED PROVIDER NOTES
Encounter Date: 10/30/2023       History     Chief Complaint   Patient presents with    Decreased Fetal Movement     HPI  Jane Muller is a 33 y.o. [id number] at 27w4d presents 15min after her toddler jumped on her abdomen. She reports she felt right groin pain immediately after, similar to pain she has been experiencing for the past few weeks which worsens with certain movements and climbing up the stairs. She states she had some decreased fetal movement afterwards, but at baseline she does not feel much movement daily. She denies LOF, VB or contractions. She is otherwise asymptomatic.    This IUP is complicated by diet controlled GDM.     Review of patient's allergies indicates:  No Known Allergies  Past Medical History:   Diagnosis Date    Anxiety      Past Surgical History:   Procedure Laterality Date     SECTION N/A 10/1/2020    Procedure:  SECTION;  Surgeon: Erwin Brandt MD;  Location: [facility];  Service: OB/GYN;  Laterality: N/A;     SECTION N/A 2022    Procedure:  SECTION;  Surgeon: Radha Easley MD;  Location: [facility];  Service: OB/GYN;  Laterality: N/A;    STOMACH SURGERY      Dr Owens - sleeve gastrectomy - was 190lbs     Family History   Problem Relation Age of Onset    Heart attack Father     Hypertension Father     No Known Problems Brother     Breast cancer Neg Hx     Colon cancer Neg Hx     Ovarian cancer Neg Hx      Social History     Tobacco Use    Smoking status: Former    Smokeless tobacco: Never   Substance Use Topics    Alcohol use: Not Currently     Comment: social    Drug use: No     Review of Systems   Constitutional:  Negative for chills and fever.   HENT:  Negative for sore throat.    Eyes:  Negative for visual disturbance.   Respiratory:  Negative for cough and shortness of breath.    Cardiovascular:  Negative for chest pain.   Gastrointestinal:  Negative for abdominal pain, diarrhea, nausea and vomiting.   Genitourinary:   Positive for pelvic pain (groin pain). Negative for difficulty urinating, vaginal bleeding and vaginal discharge.   Musculoskeletal:  Negative for myalgias.   Skin:  Negative for rash.   Neurological:  Negative for dizziness and headaches.   Psychiatric/Behavioral:  Negative for confusion.        Physical Exam     Initial Vitals [10/30/23 1755]   BP Pulse Resp Temp SpO2   117/82 70 18 97.6 °F (36.4 °C) 100 %      MAP       --       Temp:  [97.6 °F (36.4 °C)]   Pulse:  [67-71]   Resp:  [18]   BP: (111-117)/(58-82)   SpO2:  [97 %-100 %]    Physical Exam    Vitals reviewed.  Constitutional: She appears well-developed and well-nourished. She is not diaphoretic. No distress.   HENT:   Head: Normocephalic and atraumatic.   Eyes: EOM are normal.   Neck:   Normal range of motion.  Cardiovascular:  Normal rate.           Pulmonary/Chest: No respiratory distress.   Abdominal: Abdomen is soft. There is no abdominal tenderness.   No abdominal bruising There is no rebound and no guarding.   Musculoskeletal:         General: Normal range of motion.      Cervical back: Normal range of motion.     Neurological: She is alert and oriented to person, place, and time.   Skin: Skin is warm and dry.   Psychiatric: She has a normal mood and affect. Her behavior is normal. Judgment and thought content normal.         ED Course   Obtain Fetal nonstress test (NST)    Date/Time: 10/30/2023 6:31 PM    Performed by: Linn Morris MD  Authorized by: Linn Morris MD    Nonstress Test:     Variability:  6-25 BPM    Decelerations:  None    Accelerations:  15 bpm    Baseline:  140    Contractions:  Not present    Labs Reviewed - No data to display       Imaging Results    None          Medications - No data to display  Medical Decision Making   at 27w4d with decreased FM and groin pain s/p toddler jumping on her abdomen  VSS, no acute distress  Exam unremarkable, currently asymptomatic  Sx c/w round ligament pain, counseled on supportive  care measures at home  NST R&R  No ctx on toco  Overall reassuring picture  Strict ED return precautions discussed with patient  All questions answered  Patient discharged in stable condition      Linn Morris MD   OB/GYN PGY-2                Attending Attestation:   Physician Attestation Statement for Resident:  As the supervising MD   Physician Attestation Statement: I have personally seen and examined this patient.   I agree with the above history.  -:   As the supervising MD I agree with the above PE.     As the supervising MD I agree with the above treatment, course, plan, and disposition.   -: NST reactive and reassuring, no contractions noted on toco.  Patient reports movement increased in ONEL.  Exam negative for signs of trauma to the abdomen.  Agree with discharge to home  Will f/u with OB in 1 week.  I was personally present during the critical portions of the procedure(s) performed by the resident and was immediately available in the ED to provide services and assistance as needed during the entire procedure.   I have reviewed the following: old records at this facility.                                Clinical Impression:   Final diagnoses:  [O36.8120] Decreased fetal movements in second trimester, single or unspecified fetus (Primary)  [Z3A.27] 27 weeks gestation of pregnancy        ED Disposition Condition    Discharge Stable          ED Prescriptions    None       Follow-up Information    None          Linn Morris MD  Resident  10/30/23 [id number]       Autumn Dodge MD  11/01/23 1758

## 2023-10-30 NOTE — DISCHARGE INSTRUCTIONS
Contact your primary OB or after hours at [phone] if you experience any of the following:    Contractions every 7-10 minutes for 1 or more hours.   A sudden gush or constant leaking of fluid.  Heavy vaginal bleeding.   If you experience a constant headache, blurry vision, pain underneath your right rib, or sudden swelling of your hands, feet, and face.   If you are 28 weeks pregnant or greater, you can measure kick counts with a goal of 10 or more movements within 2 hours.     Remember to stay hydrated; drink 8-10 bottles of water a day.     Maintain all follow-up appointments.

## 2023-11-02 ENCOUNTER — PATIENT MESSAGE (OUTPATIENT)
Dept: OTHER | Facility: OTHER | Age: 33
End: 2023-11-02
Payer: COMMERCIAL

## 2023-11-21 ENCOUNTER — PATIENT MESSAGE (OUTPATIENT)
Dept: OBSTETRICS AND GYNECOLOGY | Facility: CLINIC | Age: 33
End: 2023-11-21
Payer: COMMERCIAL

## 2023-11-24 ENCOUNTER — TELEPHONE (OUTPATIENT)
Dept: OBSTETRICS AND GYNECOLOGY | Facility: CLINIC | Age: 33
End: 2023-11-24
Payer: COMMERCIAL

## 2023-11-24 NOTE — TELEPHONE ENCOUNTER
Late entry note.     Contacted patient Jane Muller 11/22/23. Two patient identifiers confirmed.   Discussed RSV vaccine in pregnancy. Pt was queried and all questions answered.

## 2023-11-29 ENCOUNTER — ROUTINE PRENATAL (OUTPATIENT)
Dept: OBSTETRICS AND GYNECOLOGY | Facility: CLINIC | Age: 33
End: 2023-11-29
Payer: COMMERCIAL

## 2023-11-29 ENCOUNTER — PATIENT MESSAGE (OUTPATIENT)
Dept: MATERNAL FETAL MEDICINE | Facility: CLINIC | Age: 33
End: 2023-11-29
Payer: COMMERCIAL

## 2023-11-29 ENCOUNTER — CLINICAL SUPPORT (OUTPATIENT)
Dept: OBSTETRICS AND GYNECOLOGY | Facility: CLINIC | Age: 33
End: 2023-11-29
Payer: COMMERCIAL

## 2023-11-29 VITALS — BODY MASS INDEX: 30.45 KG/M2 | DIASTOLIC BLOOD PRESSURE: 64 MMHG | SYSTOLIC BLOOD PRESSURE: 124 MMHG | WEIGHT: 183 LBS

## 2023-11-29 DIAGNOSIS — O26.843 UTERINE SIZE-DATE DISCREPANCY IN THIRD TRIMESTER: ICD-10-CM

## 2023-11-29 DIAGNOSIS — Z23 NEED FOR DIPHTHERIA-TETANUS-PERTUSSIS (TDAP) VACCINE: Primary | ICD-10-CM

## 2023-11-29 DIAGNOSIS — Z3A.31 31 WEEKS GESTATION OF PREGNANCY: Primary | ICD-10-CM

## 2023-11-29 PROCEDURE — 90715 TDAP VACCINE 7 YRS/> IM: CPT | Mod: S$GLB,,, | Performed by: STUDENT IN AN ORGANIZED HEALTH CARE EDUCATION/TRAINING PROGRAM

## 2023-11-29 PROCEDURE — 99999 PR PBB SHADOW E&M-EST. PATIENT-LVL I: CPT | Mod: PBBFAC,,,

## 2023-11-29 PROCEDURE — 90471 IMMUNIZATION ADMIN: CPT | Mod: S$GLB,,, | Performed by: STUDENT IN AN ORGANIZED HEALTH CARE EDUCATION/TRAINING PROGRAM

## 2023-11-29 PROCEDURE — 99999 PR PBB SHADOW E&M-EST. PATIENT-LVL III: CPT | Mod: PBBFAC,,, | Performed by: STUDENT IN AN ORGANIZED HEALTH CARE EDUCATION/TRAINING PROGRAM

## 2023-11-29 PROCEDURE — 0502F PR SUBSEQUENT PRENATAL CARE: ICD-10-PCS | Mod: CPTII,S$GLB,, | Performed by: STUDENT IN AN ORGANIZED HEALTH CARE EDUCATION/TRAINING PROGRAM

## 2023-11-29 PROCEDURE — 99999 PR PBB SHADOW E&M-EST. PATIENT-LVL III: ICD-10-PCS | Mod: PBBFAC,,, | Performed by: STUDENT IN AN ORGANIZED HEALTH CARE EDUCATION/TRAINING PROGRAM

## 2023-11-29 PROCEDURE — 0502F SUBSEQUENT PRENATAL CARE: CPT | Mod: CPTII,S$GLB,, | Performed by: STUDENT IN AN ORGANIZED HEALTH CARE EDUCATION/TRAINING PROGRAM

## 2023-11-29 PROCEDURE — 90471 TDAP VACCINE GREATER THAN OR EQUAL TO 7YO IM: ICD-10-PCS | Mod: S$GLB,,, | Performed by: STUDENT IN AN ORGANIZED HEALTH CARE EDUCATION/TRAINING PROGRAM

## 2023-11-29 PROCEDURE — 90715 TDAP VACCINE GREATER THAN OR EQUAL TO 7YO IM: ICD-10-PCS | Mod: S$GLB,,, | Performed by: STUDENT IN AN ORGANIZED HEALTH CARE EDUCATION/TRAINING PROGRAM

## 2023-11-29 PROCEDURE — 99999 PR PBB SHADOW E&M-EST. PATIENT-LVL I: ICD-10-PCS | Mod: PBBFAC,,,

## 2023-11-29 RX ORDER — LANOLIN ALCOHOL/MO/W.PET/CERES
1 CREAM (GRAM) TOPICAL
COMMUNITY

## 2023-11-29 NOTE — PROGRESS NOTES
C/o ligament pain  Tired  Lower back pain  Headaches  Edema in extremities and face  Mainly face

## 2023-11-29 NOTE — PROGRESS NOTES
at 31w6d   Doing well overall, +FM, -LOF/VB/CTX.   Iron, PNV compliant. Planning on getting flu shot with son. TDAP today. PP immunizations reviewed. GS for S/D. Vscan with +FM, +CM. Pedi:  Dr. Suzan Haynes, at home pedi. Glucose log:  to send. [facility] ED precautions. RTC 2wks or sooner PRN.       IUP   - Dated by 1TMS US  - PNLs:  H&H , HepCAb/HepBSAg/HIV/RPR NR, urine culture/GCCT NR  - LPS:  NILM, 10/2019; need to update - plan on postpartum  - Genetic screening:  cfDNA WNL [uploaded into Media]  - Anatomy US:  , no fetal structural malformations identified, placenta anterior  - Early 1 hr (h/o A2GDM):  158, pt elected to assume GDM diagnosis without 3hr in setting of history  Compliant with accuchecks  Continue diabetic diet, exercise  Reviewed associated risks of GDM previously   - 24 will request rCS, reviewed this may change pending A1GDM vs A2GDM/control

## 2023-11-29 NOTE — PROGRESS NOTES
.11/29/23 Pt provided Tdap VIS,  Pt administered Tdap to the right  Deltoid. Pt tolerated well. Pt instructed to wait 15 minutes in the waiting room following injection in case of reaction. Pt verbalizes understanding.     Ordering Provider:Dr Barrios    Pain before: None    Pain after: None     Patient complaints:none

## 2023-12-01 ENCOUNTER — TELEPHONE (OUTPATIENT)
Dept: OBSTETRICS AND GYNECOLOGY | Facility: CLINIC | Age: 33
End: 2023-12-01
Payer: COMMERCIAL

## 2023-12-01 NOTE — TELEPHONE ENCOUNTER
Reviewed glucose log.    From 11/6.    Fasting average:  90 (1/23 above goal)  1 hr Post breakfast average:  107 (0 above goal)  1 hr Post lunch average:  124 (1 above goal)  1 hr Post dinner average:  121 (1 above goal)    Accuchecks are still at 1 hour postprandial. Continue diet controlled at this time.

## 2023-12-08 ENCOUNTER — PROCEDURE VISIT (OUTPATIENT)
Dept: OBSTETRICS AND GYNECOLOGY | Facility: CLINIC | Age: 33
End: 2023-12-08
Payer: COMMERCIAL

## 2023-12-08 DIAGNOSIS — O26.843 UTERINE SIZE-DATE DISCREPANCY IN THIRD TRIMESTER: ICD-10-CM

## 2023-12-08 PROCEDURE — 76816 OB US FOLLOW-UP PER FETUS: CPT | Mod: S$GLB,,, | Performed by: OBSTETRICS & GYNECOLOGY

## 2023-12-08 PROCEDURE — 76816 US OB/GYN EXTENDED PROCEDURE (VIEWPOINT): ICD-10-PCS | Mod: S$GLB,,, | Performed by: OBSTETRICS & GYNECOLOGY

## 2023-12-21 ENCOUNTER — ROUTINE PRENATAL (OUTPATIENT)
Dept: OBSTETRICS AND GYNECOLOGY | Facility: CLINIC | Age: 33
End: 2023-12-21
Payer: COMMERCIAL

## 2023-12-21 ENCOUNTER — PATIENT MESSAGE (OUTPATIENT)
Dept: OTHER | Facility: OTHER | Age: 33
End: 2023-12-21
Payer: COMMERCIAL

## 2023-12-21 VITALS
DIASTOLIC BLOOD PRESSURE: 72 MMHG | WEIGHT: 185.19 LBS | BODY MASS INDEX: 30.82 KG/M2 | SYSTOLIC BLOOD PRESSURE: 122 MMHG

## 2023-12-21 DIAGNOSIS — Z3A.35 35 WEEKS GESTATION OF PREGNANCY: Primary | ICD-10-CM

## 2023-12-21 DIAGNOSIS — O24.410 DIET CONTROLLED GESTATIONAL DIABETES MELLITUS (GDM) IN THIRD TRIMESTER: ICD-10-CM

## 2023-12-21 PROCEDURE — 99999 PR PBB SHADOW E&M-EST. PATIENT-LVL III: ICD-10-PCS | Mod: PBBFAC,,, | Performed by: STUDENT IN AN ORGANIZED HEALTH CARE EDUCATION/TRAINING PROGRAM

## 2023-12-21 PROCEDURE — 0502F SUBSEQUENT PRENATAL CARE: CPT | Mod: CPTII,S$GLB,, | Performed by: STUDENT IN AN ORGANIZED HEALTH CARE EDUCATION/TRAINING PROGRAM

## 2023-12-21 PROCEDURE — 99999 PR PBB SHADOW E&M-EST. PATIENT-LVL III: CPT | Mod: PBBFAC,,, | Performed by: STUDENT IN AN ORGANIZED HEALTH CARE EDUCATION/TRAINING PROGRAM

## 2023-12-21 PROCEDURE — 0502F PR SUBSEQUENT PRENATAL CARE: ICD-10-PCS | Mod: CPTII,S$GLB,, | Performed by: STUDENT IN AN ORGANIZED HEALTH CARE EDUCATION/TRAINING PROGRAM

## 2024-01-03 ENCOUNTER — TELEPHONE (OUTPATIENT)
Dept: OBSTETRICS AND GYNECOLOGY | Facility: CLINIC | Age: 34
End: 2024-01-03
Payer: COMMERCIAL

## 2024-01-03 DIAGNOSIS — B37.9 YEAST INFECTION: Primary | ICD-10-CM

## 2024-01-03 DIAGNOSIS — B37.9 YEAST INFECTION: ICD-10-CM

## 2024-01-03 RX ORDER — ASPIRIN 325 MG
1 TABLET, DELAYED RELEASE (ENTERIC COATED) ORAL NIGHTLY
Qty: 45 G | Refills: 0 | Status: SHIPPED | OUTPATIENT
Start: 2024-01-03

## 2024-01-03 RX ORDER — ASPIRIN 325 MG
1 TABLET, DELAYED RELEASE (ENTERIC COATED) ORAL NIGHTLY
Qty: 45 G | Refills: 0 | Status: SHIPPED | OUTPATIENT
Start: 2024-01-03 | End: 2024-01-03 | Stop reason: SDUPTHER

## 2024-01-04 ENCOUNTER — LAB VISIT (OUTPATIENT)
Dept: LAB | Facility: HOSPITAL | Age: 34
End: 2024-01-04
Attending: STUDENT IN AN ORGANIZED HEALTH CARE EDUCATION/TRAINING PROGRAM
Payer: COMMERCIAL

## 2024-01-04 ENCOUNTER — PATIENT MESSAGE (OUTPATIENT)
Dept: OBSTETRICS AND GYNECOLOGY | Facility: CLINIC | Age: 34
End: 2024-01-04

## 2024-01-04 ENCOUNTER — PROCEDURE VISIT (OUTPATIENT)
Dept: OBSTETRICS AND GYNECOLOGY | Facility: CLINIC | Age: 34
End: 2024-01-04
Payer: COMMERCIAL

## 2024-01-04 ENCOUNTER — ROUTINE PRENATAL (OUTPATIENT)
Dept: OBSTETRICS AND GYNECOLOGY | Facility: CLINIC | Age: 34
End: 2024-01-04
Payer: COMMERCIAL

## 2024-01-04 VITALS
DIASTOLIC BLOOD PRESSURE: 70 MMHG | SYSTOLIC BLOOD PRESSURE: 108 MMHG | BODY MASS INDEX: 31.55 KG/M2 | WEIGHT: 189.63 LBS

## 2024-01-04 DIAGNOSIS — Z3A.37 37 WEEKS GESTATION OF PREGNANCY: ICD-10-CM

## 2024-01-04 DIAGNOSIS — Z3A.37 37 WEEKS GESTATION OF PREGNANCY: Primary | ICD-10-CM

## 2024-01-04 DIAGNOSIS — O24.410 DIET CONTROLLED GESTATIONAL DIABETES MELLITUS (GDM) IN THIRD TRIMESTER: ICD-10-CM

## 2024-01-04 DIAGNOSIS — Z3A.35 35 WEEKS GESTATION OF PREGNANCY: ICD-10-CM

## 2024-01-04 LAB
BASOPHILS # BLD AUTO: 0.04 K/UL (ref 0–0.2)
BASOPHILS NFR BLD: 0.4 % (ref 0–1.9)
DIFFERENTIAL METHOD BLD: ABNORMAL
EOSINOPHIL # BLD AUTO: 0.1 K/UL (ref 0–0.5)
EOSINOPHIL NFR BLD: 1.1 % (ref 0–8)
ERYTHROCYTE [DISTWIDTH] IN BLOOD BY AUTOMATED COUNT: 17.9 % (ref 11.5–14.5)
FERRITIN SERPL-MCNC: 7 NG/ML (ref 20–300)
HCT VFR BLD AUTO: 32.1 % (ref 37–48.5)
HGB BLD-MCNC: 9.6 G/DL (ref 12–16)
HIV 1+2 AB+HIV1 P24 AG SERPL QL IA: NORMAL
IMM GRANULOCYTES # BLD AUTO: 0.1 K/UL (ref 0–0.04)
IMM GRANULOCYTES NFR BLD AUTO: 0.9 % (ref 0–0.5)
IRON SERPL-MCNC: 392 UG/DL (ref 30–160)
LYMPHOCYTES # BLD AUTO: 2.3 K/UL (ref 1–4.8)
LYMPHOCYTES NFR BLD: 20.9 % (ref 18–48)
MCH RBC QN AUTO: 23.9 PG (ref 27–31)
MCHC RBC AUTO-ENTMCNC: 29.9 G/DL (ref 32–36)
MCV RBC AUTO: 80 FL (ref 82–98)
MONOCYTES # BLD AUTO: 1 K/UL (ref 0.3–1)
MONOCYTES NFR BLD: 8.6 % (ref 4–15)
NEUTROPHILS # BLD AUTO: 7.7 K/UL (ref 1.8–7.7)
NEUTROPHILS NFR BLD: 68.1 % (ref 38–73)
NRBC BLD-RTO: 0 /100 WBC
PLATELET # BLD AUTO: 205 K/UL (ref 150–450)
PMV BLD AUTO: 13 FL (ref 9.2–12.9)
RBC # BLD AUTO: 4.01 M/UL (ref 4–5.4)
SATURATED IRON: 55 % (ref 20–50)
TOTAL IRON BINDING CAPACITY: 710 UG/DL (ref 250–450)
TRANSFERRIN SERPL-MCNC: 480 MG/DL (ref 200–375)
WBC # BLD AUTO: 11.22 K/UL (ref 3.9–12.7)

## 2024-01-04 PROCEDURE — 87389 HIV-1 AG W/HIV-1&-2 AB AG IA: CPT | Performed by: STUDENT IN AN ORGANIZED HEALTH CARE EDUCATION/TRAINING PROGRAM

## 2024-01-04 PROCEDURE — 83540 ASSAY OF IRON: CPT | Performed by: STUDENT IN AN ORGANIZED HEALTH CARE EDUCATION/TRAINING PROGRAM

## 2024-01-04 PROCEDURE — 99999 PR PBB SHADOW E&M-EST. PATIENT-LVL III: CPT | Mod: PBBFAC,,, | Performed by: STUDENT IN AN ORGANIZED HEALTH CARE EDUCATION/TRAINING PROGRAM

## 2024-01-04 PROCEDURE — 85025 COMPLETE CBC W/AUTO DIFF WBC: CPT | Performed by: STUDENT IN AN ORGANIZED HEALTH CARE EDUCATION/TRAINING PROGRAM

## 2024-01-04 PROCEDURE — 86592 SYPHILIS TEST NON-TREP QUAL: CPT | Performed by: STUDENT IN AN ORGANIZED HEALTH CARE EDUCATION/TRAINING PROGRAM

## 2024-01-04 PROCEDURE — 0502F SUBSEQUENT PRENATAL CARE: CPT | Mod: CPTII,S$GLB,, | Performed by: STUDENT IN AN ORGANIZED HEALTH CARE EDUCATION/TRAINING PROGRAM

## 2024-01-04 PROCEDURE — 36415 COLL VENOUS BLD VENIPUNCTURE: CPT | Performed by: STUDENT IN AN ORGANIZED HEALTH CARE EDUCATION/TRAINING PROGRAM

## 2024-01-04 PROCEDURE — 82728 ASSAY OF FERRITIN: CPT | Performed by: STUDENT IN AN ORGANIZED HEALTH CARE EDUCATION/TRAINING PROGRAM

## 2024-01-04 PROCEDURE — 76816 OB US FOLLOW-UP PER FETUS: CPT | Mod: S$GLB,,, | Performed by: OBSTETRICS & GYNECOLOGY

## 2024-01-04 NOTE — PROGRESS NOTES
at 37w0d   Doing well overall, +FM, -LOF/VB/CTX. US today, reviewed; AGA.  3TMS labs with iron panel today. Elects for GBS next visit as current yeast infection. Circ, Delivery, blood consent forms reviewed and signed, pt was queried and without questions. [facility] ED precautions. RTC 1wks or sooner PRN.      Glucose log:   Fasting:  average 89, 0 values above target  Breakfast (1hr post):  average 105, 0 value above target  Lunch (1hr post):  average 117, 0 values above target  Dinner (1hr post):  average 127, 1 value above target    IUP   - Dated by 1TMS US  - PNLs:  H&H , HepCAb/HepBSAg/HIV/RPR NR, urine culture/GCCT NR  - LPS:  NILM, 10/2019; need to update - plan on postpartum  - Genetic screening:  cfDNA WNL [uploaded into Media]  - Anatomy US:  , no fetal structural malformations identified, placenta anterior  - Early 1 hr (h/o A2GDM):  158, pt elected to assume GDM diagnosis without 3hr in setting of history  Compliant with accuchecks  Continue diabetic diet, exercise  Reviewed associated risks of GDM previously   rCS at 39wks  - 24 request rCS, reviewed may change pending A1GDM vs A2GDM/control  - s/p TDAP  - BF:  no  - Pedi:  Dr. Suzan Haynes  - PPC:  Lo Loestrin  - Considering pelvic floor PT

## 2024-01-06 ENCOUNTER — PATIENT MESSAGE (OUTPATIENT)
Dept: OBSTETRICS AND GYNECOLOGY | Facility: CLINIC | Age: 34
End: 2024-01-06
Payer: COMMERCIAL

## 2024-01-06 DIAGNOSIS — O99.013 ANEMIA AFFECTING PREGNANCY IN THIRD TRIMESTER: Primary | ICD-10-CM

## 2024-01-06 LAB — RPR SER QL: NORMAL

## 2024-01-06 RX ORDER — EPINEPHRINE 0.3 MG/.3ML
0.3 INJECTION SUBCUTANEOUS ONCE AS NEEDED
OUTPATIENT
Start: 2024-01-08

## 2024-01-06 RX ORDER — DIPHENHYDRAMINE HYDROCHLORIDE 50 MG/ML
50 INJECTION, SOLUTION INTRAMUSCULAR; INTRAVENOUS ONCE AS NEEDED
OUTPATIENT
Start: 2024-01-08

## 2024-01-06 RX ORDER — SODIUM CHLORIDE 0.9 % (FLUSH) 0.9 %
10 SYRINGE (ML) INJECTION
OUTPATIENT
Start: 2024-01-08

## 2024-01-06 RX ORDER — HEPARIN 100 UNIT/ML
500 SYRINGE INTRAVENOUS
OUTPATIENT
Start: 2024-01-08

## 2024-01-11 ENCOUNTER — ROUTINE PRENATAL (OUTPATIENT)
Dept: OBSTETRICS AND GYNECOLOGY | Facility: CLINIC | Age: 34
End: 2024-01-11
Payer: COMMERCIAL

## 2024-01-11 VITALS
DIASTOLIC BLOOD PRESSURE: 74 MMHG | WEIGHT: 191.38 LBS | HEART RATE: 80 BPM | SYSTOLIC BLOOD PRESSURE: 113 MMHG | BODY MASS INDEX: 31.84 KG/M2

## 2024-01-11 DIAGNOSIS — Z3A.38 38 WEEKS GESTATION OF PREGNANCY: Primary | ICD-10-CM

## 2024-01-11 PROCEDURE — 99999 PR PBB SHADOW E&M-EST. PATIENT-LVL III: CPT | Mod: PBBFAC,,, | Performed by: STUDENT IN AN ORGANIZED HEALTH CARE EDUCATION/TRAINING PROGRAM

## 2024-01-11 PROCEDURE — 87081 CULTURE SCREEN ONLY: CPT | Performed by: STUDENT IN AN ORGANIZED HEALTH CARE EDUCATION/TRAINING PROGRAM

## 2024-01-11 PROCEDURE — 0502F SUBSEQUENT PRENATAL CARE: CPT | Mod: CPTII,S$GLB,, | Performed by: STUDENT IN AN ORGANIZED HEALTH CARE EDUCATION/TRAINING PROGRAM

## 2024-01-11 NOTE — PROGRESS NOTES
at 38w0d   Doing well overall, +FM, -LOF/VB, some irregular CTX.   Got flu shot. /GBS today. CS info sheet, Hibiclens provided.  IV iron consult placed. Continue PO iron. Plan on pRBC on hold. Pt's questions answered. Labor/DFM/[facility] ED precautions. RTC 1wks or sooner PRN.

## 2024-01-14 LAB — BACTERIA SPEC AEROBE CULT: NORMAL

## 2024-01-17 NOTE — H&P
HISTORY AND PHYSICAL                                                OBSTETRICS          Subjective:      Jane Muller is a 33 y.o.  female with IUP at 38w6d gestation who is scheduled to present to L&D for repeat  section. Pertinent medical history for this pregnancy includes A1GDM, CS x 2, anemia .  At last clinic visit, she denied vaginal bleeding and leakage of fluid.  Irregular CTX. Reported normal fetal movement. Care this pregnancy has been with Dr. Barrios.    Past Medical History:   Diagnosis Date    Anxiety        Past Surgical History:   Procedure Laterality Date     SECTION N/A 10/1/2020    Procedure:  SECTION;  Surgeon: Erwin Brandt MD;  Location: [facility] L&D;  Service: OB/GYN;  Laterality: N/A;     SECTION N/A 2022    Procedure:  SECTION;  Surgeon: Radha Easley MD;  Location: [facility];  Service: OB/GYN;  Laterality: N/A;    STOMACH SURGERY      Dr Owens - sleeve gastrectomy - was 190lbs       All: Review of patient's allergies indicates:  No Known Allergies    Meds:   No medications prior to admission.       Social History     Socioeconomic History    Marital status:    Tobacco Use    Smoking status: Former    Smokeless tobacco: Never   Substance and Sexual Activity    Alcohol use: Not Currently     Comment: social    Drug use: No    Sexual activity: Yes     Partners: Male     Birth control/protection: None       FH:   Family History   Problem Relation Age of Onset    Heart attack Father     Hypertension Father     No Known Problems Brother     Breast cancer Neg Hx     Colon cancer Neg Hx     Ovarian cancer Neg Hx        OBHx:   OB History    Para Term  AB Living   3 2 2 0 0 2   SAB IAB Ectopic Multiple Live Births   0 0 0 0 2      # Outcome Date GA Lbr Maxwell/2nd Weight Sex Delivery Anes PTL Lv   3 Current            2 Term 22 39w2d  2.95 kg (6 lb 8.1 oz) F CS-LTranv Spinal N TRANG      Name:  RAMÍREZ,GIRL JARETH      Apgar1: 9  Apgar5: 9   1 Term 10/01/20 39w1d  3.26 kg (7 lb 3 oz) M CS-LTranv Spinal  TRANG      Name: Shawn Boyce (Fabricio)       Apgar1: 9  Apgar5: 9      Obstetric Comments   Menarche 15       Objective (from  clinic visit):      BP Readings from Last 3 Encounters:   24 132/72   24 113/74   24 108/70       BMI Readings from Last 1 Encounters:   24 31.84 kg/m²       General:   Alert and cooperative   HEENT:  Normocephalic, atraumatic   Lungs:   Normal work of breathing   Heart:   Well perfused throughout   Abdomen:  Gravid, non-tender   Extremities:  Non-tender   Derm:  No visible rashes or lesions   Psych:  Appropriate mood and affect   Pelvis:   Deferred         Lab Review  Lab Results   Component Value Date    GROUPTRH A POS 2024    HGB 9.7 (L) 2024    HCT 32.1 (L) 2024     2024    RUBELLAIMMUN Reactive 2023    HEPBSAG Non-reactive 2023    [id number] Non-reactive 2024    RPR Non-reactive 2024    LABCHLA Not Detected 2023    LABNGO Not Detected 2023    LABURIN No significant growth 2023    OBGLUCOSESCR 158 (H) 2023    STREPBCULT No Group B Streptococcus isolated 2024    [id number] Negative 2024         Assessment:     33 y.o.  at 38w6d gestation for  delivery 2/2 h/o  delivery x 2     Plan:     1. Risks, benefits, alternatives and possible complications of  delivery and transfusion have been discussed in detail with the patient. All questions have been answered, and Ms. Muller has voiced understanding and agrees to the treatment plan.  2. Consents signed   3. Admit to Labor and Delivery unit for  delivery  4. 2 units pRBC ON HOLD upon arrival

## 2024-01-18 ENCOUNTER — ANESTHESIA (OUTPATIENT)
Dept: OBSTETRICS AND GYNECOLOGY | Facility: OTHER | Age: 34
End: 2024-01-18
Payer: COMMERCIAL

## 2024-01-18 ENCOUNTER — ANESTHESIA EVENT (OUTPATIENT)
Dept: OBSTETRICS AND GYNECOLOGY | Facility: OTHER | Age: 34
End: 2024-01-18
Payer: COMMERCIAL

## 2024-01-18 ENCOUNTER — HOSPITAL ENCOUNTER (INPATIENT)
Facility: OTHER | Age: 34
LOS: 2 days | Discharge: HOME | End: 2024-01-20
Attending: STUDENT IN AN ORGANIZED HEALTH CARE EDUCATION/TRAINING PROGRAM | Admitting: STUDENT IN AN ORGANIZED HEALTH CARE EDUCATION/TRAINING PROGRAM
Payer: COMMERCIAL

## 2024-01-18 DIAGNOSIS — Z98.891 STATUS POST REPEAT LOW TRANSVERSE CESAREAN SECTION: Primary | ICD-10-CM

## 2024-01-18 LAB
ABO + RH BLD: NORMAL
BASOPHILS # BLD AUTO: 0.04 K/UL (ref 0–0.2)
BASOPHILS NFR BLD: 0.4 % (ref 0–1.9)
BLD GP AB SCN CELLS X3 SERPL QL: NORMAL
DIFFERENTIAL METHOD BLD: ABNORMAL
EOSINOPHIL # BLD AUTO: 0 K/UL (ref 0–0.5)
EOSINOPHIL NFR BLD: 0.4 % (ref 0–8)
ERYTHROCYTE [DISTWIDTH] IN BLOOD BY AUTOMATED COUNT: 18.6 % (ref 11.5–14.5)
HCT VFR BLD AUTO: 31.5 % (ref 37–48.5)
HGB BLD-MCNC: 9.4 G/DL (ref 12–16)
IMM GRANULOCYTES # BLD AUTO: 0.11 K/UL (ref 0–0.04)
IMM GRANULOCYTES NFR BLD AUTO: 1.1 % (ref 0–0.5)
LYMPHOCYTES # BLD AUTO: 1.6 K/UL (ref 1–4.8)
LYMPHOCYTES NFR BLD: 16.7 % (ref 18–48)
MCH RBC QN AUTO: 23.3 PG (ref 27–31)
MCHC RBC AUTO-ENTMCNC: 29.8 G/DL (ref 32–36)
MCV RBC AUTO: 78 FL (ref 82–98)
MONOCYTES # BLD AUTO: 0.9 K/UL (ref 0.3–1)
MONOCYTES NFR BLD: 9.6 % (ref 4–15)
NEUTROPHILS # BLD AUTO: 6.9 K/UL (ref 1.8–7.7)
NEUTROPHILS NFR BLD: 71.8 % (ref 38–73)
NRBC BLD-RTO: 0 /100 WBC
PLATELET # BLD AUTO: 220 K/UL (ref 150–450)
PMV BLD AUTO: 11.2 FL (ref 9.2–12.9)
POCT GLUCOSE: 84 MG/DL (ref 70–110)
RBC # BLD AUTO: 4.04 M/UL (ref 4–5.4)
SPECIMEN OUTDATE: NORMAL
WBC # BLD AUTO: 9.66 K/UL (ref 3.9–12.7)

## 2024-01-18 PROCEDURE — 25000003 PHARM REV CODE 250

## 2024-01-18 PROCEDURE — 36004724 HC OB OR TIME LEV III - 1ST 15 MIN: Performed by: STUDENT IN AN ORGANIZED HEALTH CARE EDUCATION/TRAINING PROGRAM

## 2024-01-18 PROCEDURE — 59510 CESAREAN DELIVERY: CPT | Mod: GB,,, | Performed by: STUDENT IN AN ORGANIZED HEALTH CARE EDUCATION/TRAINING PROGRAM

## 2024-01-18 PROCEDURE — 37000008 HC ANESTHESIA 1ST 15 MINUTES: Performed by: STUDENT IN AN ORGANIZED HEALTH CARE EDUCATION/TRAINING PROGRAM

## 2024-01-18 PROCEDURE — 36004725 HC OB OR TIME LEV III - EA ADD 15 MIN: Performed by: STUDENT IN AN ORGANIZED HEALTH CARE EDUCATION/TRAINING PROGRAM

## 2024-01-18 PROCEDURE — 27201040 HC RC 50 FILTER: Performed by: STUDENT IN AN ORGANIZED HEALTH CARE EDUCATION/TRAINING PROGRAM

## 2024-01-18 PROCEDURE — 86901 BLOOD TYPING SEROLOGIC RH(D): CPT | Performed by: STUDENT IN AN ORGANIZED HEALTH CARE EDUCATION/TRAINING PROGRAM

## 2024-01-18 PROCEDURE — 11000001 HC ACUTE MED/SURG PRIVATE ROOM

## 2024-01-18 PROCEDURE — 63600175 PHARM REV CODE 636 W HCPCS

## 2024-01-18 PROCEDURE — 85025 COMPLETE CBC W/AUTO DIFF WBC: CPT | Performed by: STUDENT IN AN ORGANIZED HEALTH CARE EDUCATION/TRAINING PROGRAM

## 2024-01-18 PROCEDURE — 59510 CESAREAN DELIVERY: CPT | Mod: AA,,, | Performed by: ANESTHESIOLOGY

## 2024-01-18 PROCEDURE — 86920 COMPATIBILITY TEST SPIN: CPT

## 2024-01-18 PROCEDURE — 51702 INSERT TEMP BLADDER CATH: CPT

## 2024-01-18 PROCEDURE — 71000039 HC RECOVERY, EACH ADD'L HOUR: Performed by: STUDENT IN AN ORGANIZED HEALTH CARE EDUCATION/TRAINING PROGRAM

## 2024-01-18 PROCEDURE — 59514 CESAREAN DELIVERY ONLY: CPT | Mod: GB,82,, | Performed by: STUDENT IN AN ORGANIZED HEALTH CARE EDUCATION/TRAINING PROGRAM

## 2024-01-18 PROCEDURE — 71000033 HC RECOVERY, INTIAL HOUR: Performed by: STUDENT IN AN ORGANIZED HEALTH CARE EDUCATION/TRAINING PROGRAM

## 2024-01-18 PROCEDURE — 37000009 HC ANESTHESIA EA ADD 15 MINS: Performed by: STUDENT IN AN ORGANIZED HEALTH CARE EDUCATION/TRAINING PROGRAM

## 2024-01-18 RX ORDER — KETOROLAC TROMETHAMINE 30 MG/ML
30 INJECTION, SOLUTION INTRAMUSCULAR; INTRAVENOUS
Status: DISPENSED | OUTPATIENT
Start: 2024-01-18 | End: 2024-01-19

## 2024-01-18 RX ORDER — OXYCODONE HYDROCHLORIDE 10 MG/1
10 TABLET ORAL EVERY 4 HOURS PRN
Status: DISCONTINUED | OUTPATIENT
Start: 2024-01-18 | End: 2024-01-18

## 2024-01-18 RX ORDER — ONDANSETRON HYDROCHLORIDE 2 MG/ML
INJECTION, SOLUTION INTRAMUSCULAR; INTRAVENOUS
Status: DISCONTINUED | OUTPATIENT
Start: 2024-01-18 | End: 2024-01-18

## 2024-01-18 RX ORDER — CARBOPROST TROMETHAMINE 250 UG/ML
250 INJECTION, SOLUTION INTRAMUSCULAR
Status: DISCONTINUED | OUTPATIENT
Start: 2024-01-18 | End: 2024-01-20 | Stop reason: HOSPADM

## 2024-01-18 RX ORDER — PROCHLORPERAZINE EDISYLATE 5 MG/ML
5 INJECTION INTRAMUSCULAR; INTRAVENOUS EVERY 6 HOURS PRN
Status: DISCONTINUED | OUTPATIENT
Start: 2024-01-18 | End: 2024-01-20 | Stop reason: HOSPADM

## 2024-01-18 RX ORDER — SODIUM CHLORIDE, SODIUM LACTATE, POTASSIUM CHLORIDE, CALCIUM CHLORIDE 600; 310; 30; 20 MG/100ML; MG/100ML; MG/100ML; MG/100ML
INJECTION, SOLUTION INTRAVENOUS CONTINUOUS PRN
Status: DISCONTINUED | OUTPATIENT
Start: 2024-01-18 | End: 2024-01-18

## 2024-01-18 RX ORDER — HYDROCORTISONE 25 MG/G
CREAM TOPICAL 3 TIMES DAILY PRN
Status: DISCONTINUED | OUTPATIENT
Start: 2024-01-18 | End: 2024-01-20 | Stop reason: HOSPADM

## 2024-01-18 RX ORDER — MISOPROSTOL 200 UG/1
800 TABLET ORAL ONCE AS NEEDED
Status: DISCONTINUED | OUTPATIENT
Start: 2024-01-18 | End: 2024-01-20 | Stop reason: HOSPADM

## 2024-01-18 RX ORDER — OXYCODONE HYDROCHLORIDE 5 MG/1
5 TABLET ORAL EVERY 4 HOURS PRN
Status: DISCONTINUED | OUTPATIENT
Start: 2024-01-18 | End: 2024-01-20 | Stop reason: HOSPADM

## 2024-01-18 RX ORDER — NALBUPHINE HYDROCHLORIDE 10 MG/ML
5 INJECTION, SOLUTION INTRAMUSCULAR; INTRAVENOUS; SUBCUTANEOUS ONCE AS NEEDED
Status: DISCONTINUED | OUTPATIENT
Start: 2024-01-18 | End: 2024-01-20 | Stop reason: HOSPADM

## 2024-01-18 RX ORDER — KETOROLAC TROMETHAMINE 30 MG/ML
30 INJECTION, SOLUTION INTRAMUSCULAR; INTRAVENOUS EVERY 6 HOURS
Status: DISCONTINUED | OUTPATIENT
Start: 2024-01-18 | End: 2024-01-18

## 2024-01-18 RX ORDER — PROCHLORPERAZINE EDISYLATE 5 MG/ML
5 INJECTION INTRAMUSCULAR; INTRAVENOUS EVERY 6 HOURS PRN
Status: DISCONTINUED | OUTPATIENT
Start: 2024-01-18 | End: 2024-01-18

## 2024-01-18 RX ORDER — DEXAMETHASONE SODIUM PHOSPHATE 4 MG/ML
INJECTION, SOLUTION INTRA-ARTICULAR; INTRALESIONAL; INTRAMUSCULAR; INTRAVENOUS; SOFT TISSUE
Status: DISCONTINUED | OUTPATIENT
Start: 2024-01-18 | End: 2024-01-18

## 2024-01-18 RX ORDER — IBUPROFEN 400 MG/1
800 TABLET ORAL
Status: DISCONTINUED | OUTPATIENT
Start: 2024-01-19 | End: 2024-01-20 | Stop reason: HOSPADM

## 2024-01-18 RX ORDER — SODIUM CHLORIDE, SODIUM LACTATE, POTASSIUM CHLORIDE, CALCIUM CHLORIDE 600; 310; 30; 20 MG/100ML; MG/100ML; MG/100ML; MG/100ML
INJECTION, SOLUTION INTRAVENOUS CONTINUOUS
Status: DISCONTINUED | OUTPATIENT
Start: 2024-01-18 | End: 2024-01-20 | Stop reason: HOSPADM

## 2024-01-18 RX ORDER — ONDANSETRON 8 MG/1
8 TABLET, ORALLY DISINTEGRATING ORAL EVERY 8 HOURS PRN
Status: DISCONTINUED | OUTPATIENT
Start: 2024-01-18 | End: 2024-01-20 | Stop reason: HOSPADM

## 2024-01-18 RX ORDER — ONDANSETRON HYDROCHLORIDE 2 MG/ML
4 INJECTION, SOLUTION INTRAVENOUS EVERY 6 HOURS PRN
Status: DISCONTINUED | OUTPATIENT
Start: 2024-01-18 | End: 2024-01-18

## 2024-01-18 RX ORDER — ACETAMINOPHEN 325 MG/1
650 TABLET ORAL EVERY 6 HOURS
Status: DISCONTINUED | OUTPATIENT
Start: 2024-01-18 | End: 2024-01-18

## 2024-01-18 RX ORDER — OXYTOCIN 10 [USP'U]/ML
INJECTION, SOLUTION INTRAMUSCULAR; INTRAVENOUS
Status: DISCONTINUED | OUTPATIENT
Start: 2024-01-18 | End: 2024-01-18

## 2024-01-18 RX ORDER — BUPIVACAINE HYDROCHLORIDE 7.5 MG/ML
INJECTION, SOLUTION INTRASPINAL
Status: DISCONTINUED | OUTPATIENT
Start: 2024-01-18 | End: 2024-01-18

## 2024-01-18 RX ORDER — DOCUSATE SODIUM 100 MG/1
200 CAPSULE, LIQUID FILLED ORAL 2 TIMES DAILY
Status: DISCONTINUED | OUTPATIENT
Start: 2024-01-18 | End: 2024-01-20 | Stop reason: HOSPADM

## 2024-01-18 RX ORDER — PRENATAL WITH FERROUS FUM AND FOLIC ACID 3080; 920; 120; 400; 22; 1.84; 3; 20; 10; 1; 12; 200; 27; 25; 2 [IU]/1; [IU]/1; MG/1; [IU]/1; MG/1; MG/1; MG/1; MG/1; MG/1; MG/1; UG/1; MG/1; MG/1; MG/1; MG/1
1 TABLET ORAL DAILY
Status: DISCONTINUED | OUTPATIENT
Start: 2024-01-18 | End: 2024-01-20 | Stop reason: HOSPADM

## 2024-01-18 RX ORDER — DIPHENHYDRAMINE HCL 25 MG
25 CAPSULE ORAL EVERY 6 HOURS PRN
Status: DISCONTINUED | OUTPATIENT
Start: 2024-01-18 | End: 2024-01-20 | Stop reason: HOSPADM

## 2024-01-18 RX ORDER — SIMETHICONE 80 MG
1 TABLET,CHEWABLE ORAL EVERY 6 HOURS PRN
Status: DISCONTINUED | OUTPATIENT
Start: 2024-01-18 | End: 2024-01-20 | Stop reason: HOSPADM

## 2024-01-18 RX ORDER — ACETAMINOPHEN 500 MG
1000 TABLET ORAL
Status: COMPLETED | OUTPATIENT
Start: 2024-01-18 | End: 2024-01-18

## 2024-01-18 RX ORDER — OXYCODONE HYDROCHLORIDE 5 MG/1
5 TABLET ORAL EVERY 4 HOURS PRN
Status: DISCONTINUED | OUTPATIENT
Start: 2024-01-18 | End: 2024-01-18

## 2024-01-18 RX ORDER — FAMOTIDINE 10 MG/ML
20 INJECTION INTRAVENOUS
Status: COMPLETED | OUTPATIENT
Start: 2024-01-18 | End: 2024-01-18

## 2024-01-18 RX ORDER — SODIUM CITRATE AND CITRIC ACID MONOHYDRATE 334; 500 MG/5ML; MG/5ML
30 SOLUTION ORAL
Status: COMPLETED | OUTPATIENT
Start: 2024-01-18 | End: 2024-01-18

## 2024-01-18 RX ORDER — AMOXICILLIN 250 MG
1 CAPSULE ORAL NIGHTLY PRN
Status: DISCONTINUED | OUTPATIENT
Start: 2024-01-18 | End: 2024-01-20 | Stop reason: HOSPADM

## 2024-01-18 RX ORDER — ONDANSETRON HYDROCHLORIDE 2 MG/ML
4 INJECTION, SOLUTION INTRAVENOUS EVERY 6 HOURS PRN
Status: DISCONTINUED | OUTPATIENT
Start: 2024-01-18 | End: 2024-01-20 | Stop reason: HOSPADM

## 2024-01-18 RX ORDER — ACETAMINOPHEN 325 MG/1
650 TABLET ORAL
Status: DISCONTINUED | OUTPATIENT
Start: 2024-01-18 | End: 2024-01-20 | Stop reason: HOSPADM

## 2024-01-18 RX ORDER — TRANEXAMIC ACID 10 MG/ML
1000 INJECTION, SOLUTION INTRAVENOUS EVERY 30 MIN PRN
Status: DISCONTINUED | OUTPATIENT
Start: 2024-01-18 | End: 2024-01-20 | Stop reason: HOSPADM

## 2024-01-18 RX ORDER — FENTANYL CITRATE 50 UG/ML
INJECTION, SOLUTION INTRAMUSCULAR; INTRAVENOUS
Status: DISCONTINUED | OUTPATIENT
Start: 2024-01-18 | End: 2024-01-18

## 2024-01-18 RX ORDER — OXYCODONE HYDROCHLORIDE 10 MG/1
10 TABLET ORAL EVERY 4 HOURS PRN
Status: DISCONTINUED | OUTPATIENT
Start: 2024-01-18 | End: 2024-01-20 | Stop reason: HOSPADM

## 2024-01-18 RX ORDER — DIPHENHYDRAMINE HYDROCHLORIDE 50 MG/ML
12.5 INJECTION INTRAMUSCULAR; INTRAVENOUS
Status: DISCONTINUED | OUTPATIENT
Start: 2024-01-18 | End: 2024-01-20 | Stop reason: HOSPADM

## 2024-01-18 RX ORDER — METHYLERGONOVINE MALEATE 0.2 MG/ML
200 INJECTION INTRAVENOUS ONCE AS NEEDED
Status: DISCONTINUED | OUTPATIENT
Start: 2024-01-18 | End: 2024-01-20 | Stop reason: HOSPADM

## 2024-01-18 RX ORDER — MIDAZOLAM HYDROCHLORIDE 1 MG/ML
INJECTION INTRAMUSCULAR; INTRAVENOUS
Status: DISCONTINUED | OUTPATIENT
Start: 2024-01-18 | End: 2024-01-18

## 2024-01-18 RX ORDER — OXYTOCIN/RINGER'S LACTATE 30/500 ML
95 PLASTIC BAG, INJECTION (ML) INTRAVENOUS CONTINUOUS
Status: DISCONTINUED | OUTPATIENT
Start: 2024-01-18 | End: 2024-01-20 | Stop reason: HOSPADM

## 2024-01-18 RX ORDER — MORPHINE SULFATE 0.5 MG/ML
INJECTION, SOLUTION EPIDURAL; INTRATHECAL; INTRAVENOUS
Status: DISCONTINUED | OUTPATIENT
Start: 2024-01-18 | End: 2024-01-18

## 2024-01-18 RX ADMIN — ACETAMINOPHEN 650 MG: 325 TABLET, FILM COATED ORAL at 12:01

## 2024-01-18 RX ADMIN — PHENYLEPHRINE HYDROCHLORIDE 0.5 MCG/KG/MIN: 10 INJECTION INTRAVENOUS at 06:01

## 2024-01-18 RX ADMIN — OXYCODONE HYDROCHLORIDE 10 MG: 10 TABLET ORAL at 03:01

## 2024-01-18 RX ADMIN — MIDAZOLAM HYDROCHLORIDE 2 MG: 1 INJECTION, SOLUTION INTRAMUSCULAR; INTRAVENOUS at 06:01

## 2024-01-18 RX ADMIN — SODIUM CHLORIDE 2 G: 9 INJECTION, SOLUTION INTRAVENOUS at 06:01

## 2024-01-18 RX ADMIN — OXYTOCIN 6 UNITS: 10 INJECTION, SOLUTION INTRAMUSCULAR; INTRAVENOUS at 07:01

## 2024-01-18 RX ADMIN — KETOROLAC TROMETHAMINE 30 MG: 30 INJECTION, SOLUTION INTRAMUSCULAR; INTRAVENOUS at 03:01

## 2024-01-18 RX ADMIN — OXYCODONE HYDROCHLORIDE 10 MG: 10 TABLET ORAL at 08:01

## 2024-01-18 RX ADMIN — Medication 0.1 MG: at 06:01

## 2024-01-18 RX ADMIN — DOCUSATE SODIUM 200 MG: 100 CAPSULE, LIQUID FILLED ORAL at 10:01

## 2024-01-18 RX ADMIN — KETOROLAC TROMETHAMINE 30 MG: 30 INJECTION, SOLUTION INTRAMUSCULAR; INTRAVENOUS at 10:01

## 2024-01-18 RX ADMIN — ACETAMINOPHEN 1000 MG: 500 TABLET ORAL at 06:01

## 2024-01-18 RX ADMIN — ACETAMINOPHEN 650 MG: 325 TABLET, FILM COATED ORAL at 05:01

## 2024-01-18 RX ADMIN — FENTANYL CITRATE 10 MCG: 0.05 INJECTION, SOLUTION INTRAMUSCULAR; INTRAVENOUS at 06:01

## 2024-01-18 RX ADMIN — BUPIVACAINE HYDROCHLORIDE IN DEXTROSE 1.6 MG: 7.5 INJECTION, SOLUTION SUBARACHNOID at 06:01

## 2024-01-18 RX ADMIN — Medication 95 MILLI-UNITS/MIN: at 08:01

## 2024-01-18 RX ADMIN — SODIUM CHLORIDE, SODIUM LACTATE, POTASSIUM CHLORIDE, AND CALCIUM CHLORIDE: 600; 310; 30; 20 INJECTION, SOLUTION INTRAVENOUS at 06:01

## 2024-01-18 RX ADMIN — SODIUM CITRATE AND CITRIC ACID MONOHYDRATE 30 ML: 500; 334 SOLUTION ORAL at 06:01

## 2024-01-18 RX ADMIN — DEXAMETHASONE SODIUM PHOSPHATE 4 MG: 4 INJECTION, SOLUTION INTRAMUSCULAR; INTRAVENOUS at 07:01

## 2024-01-18 RX ADMIN — FAMOTIDINE 20 MG: 10 INJECTION, SOLUTION INTRAVENOUS at 06:01

## 2024-01-18 RX ADMIN — ONDANSETRON 4 MG: 2 INJECTION INTRAMUSCULAR; INTRAVENOUS at 06:01

## 2024-01-18 RX ADMIN — OXYCODONE HYDROCHLORIDE 5 MG: 5 TABLET ORAL at 09:01

## 2024-01-18 RX ADMIN — SODIUM CHLORIDE, POTASSIUM CHLORIDE, SODIUM LACTATE AND CALCIUM CHLORIDE: 600; 310; 30; 20 INJECTION, SOLUTION INTRAVENOUS at 05:01

## 2024-01-18 RX ADMIN — SODIUM CHLORIDE, SODIUM LACTATE, POTASSIUM CHLORIDE, AND CALCIUM CHLORIDE: 600; 310; 30; 20 INJECTION, SOLUTION INTRAVENOUS at 07:01

## 2024-01-18 NOTE — INTERVAL H&P NOTE
Jane Muller is 33 y.o.  at 39w0d wga presenting for scheduled rLTCS.    Temp:  [98.6 °F (37 °C)] 98.6 °F (37 °C)  Pulse:  [93] 93  SpO2:  [98 %] 98 %  BP: (111)/(69) 111/69    FHT: 135 bpm, moderate variability, +accels, -decels; Cat 1 (reassuring)  [place]: difficult to discern on TOCO  Presentation: cephalic by ultrasound    1) to OR for scheduled rLTCS    2) A1GDM  - Will obtain fasting BG    3) Anemia   - Asymptomatic  - H/H pending on admit  - 2u pRBCs held    Contraception: per primary OB    The patient has been examined and the H&P has been reviewed:    I concur with the findings and no changes have occurred since H&P was written.    Surgery risks, benefits and alternative options discussed and understood by patient/family.      Active Hospital Problems    Diagnosis  POA    *Status post repeat low transverse  section [Z98.891]  Not Applicable      Resolved Hospital Problems   No resolved problems to display.     Yael Ron MD  Obstetrics and Gynecology - PGY1

## 2024-01-18 NOTE — PROGRESS NOTES
Certification of Assistant at Surgery       Surgery Date: 2024     Participating Surgeons:  Surgeon(s) and Role:     * Zuleyka Barrios MD - Primary     * Linn Morris MD - Resident - Assisting     * Madhuri Carlin MD - Assisting    Procedures:  Procedure(s) (LRB):   SECTION (N/A)    Assistant Surgeon's Certification of Necessity:  I understand that section 1842 (b) (6) (d) of the Social Security Act generally prohibits Medicare Part B reasonable charge payment for the services of assistants at surgery in [facility] hospitals when qualified residents are available to furnish such services. I certify that the services for which payment is claimed were medically necessary, and that no qualified resident was available to perform the services. I further understand that these services are subject to post-payment review by the Medicare carrier.    Madhuri Carlin MD    2024  8:08 AM

## 2024-01-18 NOTE — ANESTHESIA PROCEDURE NOTES
Spinal    Diagnosis:   Patient location during procedure: OR  Start time: 2024 6:40 AM  Timeout: 2024 6:36 AM  End time: 2024 6:46 AM    Staffing  Authorizing Provider: Jojo Delgado MD  Performing Provider: Prem Chaudhari MD    Staffing  Performed by: Prem Chaudhari MD  Authorized by: Jojo Delgado MD    Preanesthetic Checklist  Completed: patient identified, IV checked, site marked, risks and benefits discussed, surgical consent, monitors and equipment checked, pre-op evaluation and timeout performed  Spinal Block  Patient position: sitting  Prep: ChloraPrep  Patient monitoring: heart rate, continuous pulse ox and frequent blood pressure checks  Approach: midline  Location: L4-5  Injection technique: single shot  CSF Fluid: clear free-flowing CSF  Needle  Needle type: Melchor   Needle gauge: 25 G  Needle length: 3.5 in  Additional Documentation: negative aspiration for heme and incremental injection  Needle localization: anatomical landmarks  Assessment  Sensory level: T5   Dermatomal levels determined by pinch or prick  Ease of block: easy  Patient's tolerance of the procedure: comfortable throughout block

## 2024-01-18 NOTE — PLAN OF CARE
24 0914   OB SCREEN   Assessment Type Discharge Planning Brief Assessment   Source of Information health record   Received Prenatal Care Yes   Any indications/suspicions for None   Is this a teen pregnancy No   Is the baby in NICU No   Indication for adoption/Safe Haven No   Indication for DME/post-acute needs No   HIV (+) No   Any congenital  disorders No   Fetal demise/ death No     Patient has been screened for Social Work discharge planning needs. Based on documentation in medical record, no discharge planning needs are anticipated at this time. Should any discharge planning needs arise, please consult .

## 2024-01-18 NOTE — PLAN OF CARE
Pt VSS. Pain controlled with oral and IV pain medication. Formula feeding independently. Fundus firm and midline with light lochia rubra. LTV dressing in place, clean/dry/intact. Diop discontinued at 1630, due to void by 2230. Passing gas. No further needs at this time.    Problem: Diabetes in Pregnancy  Goal: Blood Glucose Level   Goal: Patient-Specific Goal (Individualized)  Outcome: Ongoing, Progressing  Goal: Absence of Hospital-Acquired Illness or Injury  Outcome: Ongoing, Progressing  Goal: Optimal Comfort and Wellbeing  Outcome: Ongoing, Progressing  Goal: Readiness for Transition of Care  Outcome: Ongoing, Progressing     Problem:  Fall Injury Risk  Goal: Absence of Fall, Infant Drop and Related Injury  Outcome: Ongoing, Progressing     Problem: Adjustment to Role Transition (Postpartum  Delivery)  Goal: Successful Maternal Role Transition  Outcome: Ongoing, Progressing     Problem: Bleeding (Postpartum  Delivery)  Goal: Hemostasis  Outcome: Ongoing, Progressing     Problem: Infection (Postpartum  Delivery)  Goal: Absence of Infection Signs and Symptoms  Outcome: Ongoing, Progressing     Problem: Pain (Postpartum  Delivery)  Goal: Acceptable Pain Control  Outcome: Ongoing, Progressing     Problem: Postoperative Nausea and Vomiting (Postpartum  Delivery)  Goal: Nausea and Vomiting Relief  Outcome: Ongoing, Progressing     Problem: Postoperative Urinary Retention (Postpartum  Delivery)  Goal: Effective Urinary Elimination  Outcome: Ongoing, Progressing     Problem: Infection  Goal: Absence of Infection Signs and Symptoms  Outcome: Ongoing, Progressing     Problem: Skin Injury Risk Increased  Goal: Skin Health and Integrity  Outcome: Ongoing, Progressing

## 2024-01-18 NOTE — TRANSFER OF CARE
"Anesthesia Transfer of Care Note    Patient: Jane Muller    Procedure(s) Performed: Procedure(s) (LRB):   SECTION (N/A)    Patient location: PACU    Anesthesia Type: spinal    Transport from OR: Transported from OR on room air with adequate spontaneous ventilation    Post pain: adequate analgesia    Post assessment: no apparent anesthetic complications    Post vital signs: stable    Level of consciousness: awake and alert    Nausea/Vomiting: no nausea/vomiting    Complications: none    Transfer of care protocol was followed      Last vitals: Visit Vitals  /69   Pulse 93   Temp 37 °C (98.6 °F) (Axillary)   Ht 5' 5" (1.651 m)   Wt 86.8 kg (191 lb 5.8 oz)   LMP 2023   SpO2 98%   Breastfeeding No   BMI 31.84 kg/m²     "

## 2024-01-18 NOTE — ANESTHESIA PREPROCEDURE EVALUATION
[facility]  Anesthesia Pre-Operative Evaluation         Patient Name: Jane Muller  YOB: 1990  MRN: [id number]    2024      Jane Muller is a 33 y.o. female  at 39w0d who presents for scheduled repeat c/s. Current IUP has been c/b prior LTCS x2, obesity (31), gestational DM, GERD, anemia. Has a hx of sleeve gastrectomy    Previous pregnancies have been c/s x 2.    She reports previous neuraxial anesthesia. She denies complications with these.    She denies history of HTN, asthma, bleeding or coagulation disorders, spine abnormalities, or previous back surgeries.      OB History    Para Term  AB Living   3 2 2 0 0 2   SAB IAB Ectopic Multiple Live Births   0 0 0 0 2      # Outcome Date GA Lbr Maxwell/2nd Weight Sex Delivery Anes PTL Lv   3 Current            2 Term 22 39w2d  2.95 kg (6 lb 8.1 oz) F CS-LTranv Spinal N TRANG   1 Term 10/01/20 39w1d  3.26 kg (7 lb 3 oz) M CS-LTranv Spinal  TRANG      Obstetric Comments   Menarche 15       Review of patient's allergies indicates:  No Known Allergies    Wt Readings from Last 1 Encounters:   24 1002 86.8 kg (191 lb 5.8 oz)       BP Readings from Last 3 Encounters:   24 132/72   24 113/74   24 108/70       Patient Active Problem List   Diagnosis    Fibrocystic breast    History of weight loss surgery    Gestational diabetes mellitus (GDM) in second trimester     delivery delivered    Status post repeat low transverse  section    Chronic GERD    Pre-diabetes    Anemia affecting pregnancy in third trimester       Past Surgical History:   Procedure Laterality Date     SECTION N/A 10/1/2020    Procedure:  SECTION;  Surgeon: Erwin Brandt MD;  Location: [facility] L&D;  Service: OB/GYN;  Laterality: N/A;     SECTION N/A 2022    Procedure:  SECTION;  Surgeon: Radha Easley MD;  Location: [facility] L&D;  Service: OB/GYN;   "Laterality: N/A;    STOMACH SURGERY  2017    Dr Owens - sleeve gastrectomy - was 190lbs       Tobacco Use: Medium Risk (1/11/2024)    Patient History     Smoking Tobacco Use: Former     Smokeless Tobacco Use: Never     Passive Exposure: Not on file     Alcohol Use: Not on file     Social History     Substance and Sexual Activity   Drug Use No         Chemistry        Component Value Date/Time     (L) 01/17/2024 0215    K 3.9 01/17/2024 0215     01/17/2024 0215    CO2 18 (L) 01/17/2024 0215    BUN 15 01/17/2024 0215    CREATININE 0.7 01/17/2024 0215     01/17/2024 0215        Component Value Date/Time    CALCIUM 8.5 (L) 01/17/2024 0215    ALKPHOS 114 01/17/2024 0215    AST 17 01/17/2024 0215    ALT 9 (L) 01/17/2024 0215    BILITOT 0.4 01/17/2024 0215    ESTGFRAFRICA >60 03/29/2021 1048    EGFRNONAA >60 03/29/2021 1048            Lab Results   Component Value Date    WBC 15.97 (H) 01/17/2024    HGB 9.7 (L) 01/17/2024    HCT 32.1 (L) 01/17/2024     01/17/2024       No results found for: "LABPROT", "INR", "APTT"      Pre-op Assessment          Review of Systems  Anesthesia Hx:             Denies Family Hx of Anesthesia complications.    Denies Personal Hx of Anesthesia complications.                    Social:  Non-Smoker, No Alcohol Use       Cardiovascular:  Cardiovascular Normal                                            Pulmonary:  Pulmonary Normal                       Endocrine:  Endocrine Normal                Physical Exam  General: Well nourished and Cooperative    Airway:  Mallampati: II / I    Dental:  Intact        Anesthesia Plan  Type of Anesthesia, risks & benefits discussed:    Anesthesia Type: Gen ETT, Epidural, Spinal, CSE  Intra-op Monitoring Plan: Standard ASA Monitors  Post Op Pain Control Plan: multimodal analgesia  Informed Consent: Informed consent signed with the Patient and all parties understand the risks and agree with anesthesia plan.  All questions answered.   ASA " Score: 2  Day of Surgery Review of History & Physical: H&P Update referred to the surgeon/provider.    Ready For Surgery From Anesthesia Perspective.     .

## 2024-01-18 NOTE — OP NOTE
[facility]   Section   Operative Note     SUMMARY      Date of Procedure: 2024      Procedure: Procedure(s) (LRB):   SECTION (N/A)     Surgeon(s) and Role:     * Daksha Barrios MD - Primary     * Madhuri Carlin MD - Assisting     * Linn Morris MD - Assisting     Pre-Operative Diagnosis:    A1GDM  History of Csection x 2  Anemia     Post-Operative Diagnosis:   Same as above     Anesthesia: Spinal     Description of the Findings of the Procedure: Normal appearing uterus, bilateral tubes and ovaries. Vigorous liveborn infant.     Complications: No     Blood Loss: 675cc     Indication:   History of Csection x2     Procedure in detail:    TIME OUT:  Immediately prior to procedure, time out was performed to include correct patient, agreement on procedure to be performed, correct side, site, position, accurate procedure consent, relevant images, antibiotics, fluids, safety precautions, and availability of any special implants that might be required.    The patient was taken to the operating room where spinal anesthesia was found to be adequate.  She was then prepared and draped in the normal sterile fashion in the dorsal supine position with a leftward tilt.  A pfannenstiel skin incision was then made with the scalpel and carried through to the underlying layer of fascia. The fascia was incised in the midline and the incision extended laterally with the Stokes scissors.  The superior and inferior aspects of the fascial incision were then grasped tented up with the Kocher clamps, and the rectus muscle dissected off bluntly.  The rectus muscles were then  in the midline, and the peritoneum entered bluntly. The peritoneal incision was then superiorly and inferiorly with good visualization of the bladder.      Uterus, tubes, and ovaries were found to be normal. The bladder blade was then inserted and metzenbaum scissors were then used to create a bladder flap. The  bladder blade was reinsterted and the lower uterine segment incised in a transverse fashion with the scalpel. The bladder blade was removed and the infant's head delivered atraumatically.  The nose and mouth were bulb suctioned and the cord was clamped and cut after 60 second delay.  The infant was then handed to waiting pediatric team.    The placenta delivered spontaneously, the uterus exteriorized, and cleared of all clots and debris.  The uterine incision was repaired with 1.0 chromic in a running locked fashion.  The fascia was reapproximated with PDS in a running fashion.  The subcutaneous tissue and subcuticular skin was closed with suture.    The patient tolerated the procedure well, sponge lap and needle counts were correct.  The patient was taken to the recovery room in stable condition.      Condition: Good  Specimens: None  Disposition:  recovery room PACU - hemodynamically stable.      Delivery Information for Cj Muller    Birth information:  YOB: 2023   Time of birth: 7:11 AM   Sex: male   Head Delivery Date/Time: 2024  7:11 AM   Delivery type: , Low Transverse   Gestational Age: 39w0d        Delivery Providers    Delivering clinician: Zuleyka Barrios MD   Provider Role    Madhuri Carlin MD Singer, Emily, MD Beshenich, Kayla, RN Barcia-Pique, Melissa A, RN Crouch, Callie R., ST               Measurements    Weight: 3250 g  Weight (lbs): 7 lb 2.6 oz  Length:          Apgars    Living status: Living  Apgar Component Scores:  1 min.:  5 min.:  10 min.:  15 min.:  20 min.:    Skin color:  0  1       Heart rate:  2  2       Reflex irritability:  2  2       Muscle tone:  2  2       Respiratory effort:  2  2       Total:  8  9       Apgars assigned by: JOSEP JACKSON RN         Operative Delivery    Forceps attempted?: No  Vacuum extractor attempted?: No         Shoulder Dystocia    Shoulder dystocia present?: No           Presentation     Presentation: Vertex           Interventions/Resuscitation    Method: Bulb Suctioning, Tactile Stimulation       Cord    Vessels: 3 vessels  Complications: None  Delayed Cord Clamping?: Yes  Cord Clamped Date/Time: 2024  7:12 AM  Cord Blood Disposition: Discarded  Gases Sent?: No  Stem Cell Collection (by MD): No       Placenta    Placenta delivery date/time: 2024 0713  Placenta removal: Manual removal  Placenta appearance: Intact  Placenta disposition: Discarded           Labor Events:       labor:       Labor Onset Date/Time:         Dilation Complete Date/Time:         Start Pushing Date/Time:       Rupture Date/Time:            Rupture type:          Fluid Amount:       Fluid Color:        steroids:       Antibiotics given for GBS:       Induction:       Indications for induction:        Augmentation:       Indications for augmentation:       Labor complications:       Additional complications:          Cervical ripening:                     Delivery:      Episiotomy:       Indication for Episiotomy:       Perineal Lacerations:   Repaired:      Periurethral Laceration:   Repaired:     Labial Laceration:   Repaired:     Sulcus Laceration:   Repaired:     Vaginal Laceration:   Repaired:     Cervical Laceration:   Repaired:     Repair suture:       Repair # of packets:       Last Value - EBL - Nursing (mL):       Sum - EBL - Nursing (mL): 0     Last Value - EBL - Anesthesia (mL):      Calculated QBL (mL): 675      Vaginal Sweep Performed:       Surgicount Correct:         Other providers:       Anesthesia    Method: Spinal          Details (if applicable):  Trial of Labor No    Categorization: Repeat    Priority: Routine   Indications for : Repeat Section   Incision Type: low transverse     Additional  information:  Forceps:    Vacuum:    Breech:    Observed anomalies    Other (Comments):         No qualified resident available for procedure.

## 2024-01-19 LAB
BASOPHILS # BLD AUTO: 0.03 K/UL (ref 0–0.2)
BASOPHILS NFR BLD: 0.2 % (ref 0–1.9)
DIFFERENTIAL METHOD BLD: ABNORMAL
EOSINOPHIL # BLD AUTO: 0.1 K/UL (ref 0–0.5)
EOSINOPHIL NFR BLD: 0.7 % (ref 0–8)
ERYTHROCYTE [DISTWIDTH] IN BLOOD BY AUTOMATED COUNT: 18.5 % (ref 11.5–14.5)
GLUCOSE SERPL-MCNC: 88 MG/DL (ref 70–110)
HCT VFR BLD AUTO: 24.3 % (ref 37–48.5)
HGB BLD-MCNC: 7.3 G/DL (ref 12–16)
IMM GRANULOCYTES # BLD AUTO: 0.06 K/UL (ref 0–0.04)
IMM GRANULOCYTES NFR BLD AUTO: 0.5 % (ref 0–0.5)
LYMPHOCYTES # BLD AUTO: 2.1 K/UL (ref 1–4.8)
LYMPHOCYTES NFR BLD: 17.8 % (ref 18–48)
MCH RBC QN AUTO: 23.6 PG (ref 27–31)
MCHC RBC AUTO-ENTMCNC: 30 G/DL (ref 32–36)
MCV RBC AUTO: 79 FL (ref 82–98)
MONOCYTES # BLD AUTO: 1.2 K/UL (ref 0.3–1)
MONOCYTES NFR BLD: 9.7 % (ref 4–15)
NEUTROPHILS # BLD AUTO: 8.6 K/UL (ref 1.8–7.7)
NEUTROPHILS NFR BLD: 71.1 % (ref 38–73)
NRBC BLD-RTO: 0 /100 WBC
PLATELET # BLD AUTO: 200 K/UL (ref 150–450)
PMV BLD AUTO: 11.1 FL (ref 9.2–12.9)
RBC # BLD AUTO: 3.09 M/UL (ref 4–5.4)
WBC # BLD AUTO: 12.04 K/UL (ref 3.9–12.7)

## 2024-01-19 PROCEDURE — 25000003 PHARM REV CODE 250

## 2024-01-19 PROCEDURE — 11000001 HC ACUTE MED/SURG PRIVATE ROOM

## 2024-01-19 PROCEDURE — 99024 POSTOP FOLLOW-UP VISIT: CPT | Mod: ,,, | Performed by: OBSTETRICS & GYNECOLOGY

## 2024-01-19 PROCEDURE — 36415 COLL VENOUS BLD VENIPUNCTURE: CPT | Performed by: STUDENT IN AN ORGANIZED HEALTH CARE EDUCATION/TRAINING PROGRAM

## 2024-01-19 PROCEDURE — 85025 COMPLETE CBC W/AUTO DIFF WBC: CPT | Performed by: STUDENT IN AN ORGANIZED HEALTH CARE EDUCATION/TRAINING PROGRAM

## 2024-01-19 PROCEDURE — 63600175 PHARM REV CODE 636 W HCPCS

## 2024-01-19 PROCEDURE — 82947 ASSAY GLUCOSE BLOOD QUANT: CPT | Performed by: STUDENT IN AN ORGANIZED HEALTH CARE EDUCATION/TRAINING PROGRAM

## 2024-01-19 RX ORDER — ACETAMINOPHEN 325 MG/1
650 TABLET ORAL EVERY 6 HOURS
Qty: 60 TABLET | Refills: 0 | Status: SHIPPED | OUTPATIENT
Start: 2024-01-19

## 2024-01-19 RX ORDER — DOCUSATE SODIUM 100 MG/1
100 CAPSULE, LIQUID FILLED ORAL 2 TIMES DAILY
Qty: 28 CAPSULE | Refills: 0 | Status: SHIPPED | OUTPATIENT
Start: 2024-01-19 | End: 2024-02-02

## 2024-01-19 RX ORDER — FAMOTIDINE 20 MG/1
40 TABLET, FILM COATED ORAL DAILY
Status: DISCONTINUED | OUTPATIENT
Start: 2024-01-19 | End: 2024-01-20 | Stop reason: HOSPADM

## 2024-01-19 RX ORDER — IBUPROFEN 600 MG/1
600 TABLET ORAL EVERY 6 HOURS
Qty: 60 TABLET | Refills: 1 | Status: SHIPPED | OUTPATIENT
Start: 2024-01-19

## 2024-01-19 RX ORDER — OXYCODONE HYDROCHLORIDE 5 MG/1
5 TABLET ORAL EVERY 4 HOURS PRN
Qty: 30 TABLET | Refills: 0 | Status: SHIPPED | OUTPATIENT
Start: 2024-01-19 | End: 2024-02-05 | Stop reason: SDUPTHER

## 2024-01-19 RX ORDER — SIMETHICONE 80 MG
80 TABLET,CHEWABLE ORAL
Qty: 15 TABLET | Refills: 0 | Status: SHIPPED | OUTPATIENT
Start: 2024-01-19 | End: 2024-02-05 | Stop reason: SDUPTHER

## 2024-01-19 RX ADMIN — OXYCODONE HYDROCHLORIDE 5 MG: 5 TABLET ORAL at 01:01

## 2024-01-19 RX ADMIN — DOCUSATE SODIUM 200 MG: 100 CAPSULE, LIQUID FILLED ORAL at 09:01

## 2024-01-19 RX ADMIN — ACETAMINOPHEN 650 MG: 325 TABLET, FILM COATED ORAL at 08:01

## 2024-01-19 RX ADMIN — OXYCODONE HYDROCHLORIDE 10 MG: 10 TABLET ORAL at 05:01

## 2024-01-19 RX ADMIN — KETOROLAC TROMETHAMINE 30 MG: 30 INJECTION, SOLUTION INTRAMUSCULAR; INTRAVENOUS at 05:01

## 2024-01-19 RX ADMIN — ACETAMINOPHEN 650 MG: 325 TABLET, FILM COATED ORAL at 12:01

## 2024-01-19 RX ADMIN — ACETAMINOPHEN 650 MG: 325 TABLET, FILM COATED ORAL at 06:01

## 2024-01-19 RX ADMIN — SIMETHICONE 80 MG: 80 TABLET, CHEWABLE ORAL at 07:01

## 2024-01-19 RX ADMIN — IBUPROFEN 800 MG: 400 TABLET, FILM COATED ORAL at 01:01

## 2024-01-19 RX ADMIN — DOCUSATE SODIUM 200 MG: 100 CAPSULE, LIQUID FILLED ORAL at 08:01

## 2024-01-19 RX ADMIN — IBUPROFEN 800 MG: 400 TABLET, FILM COATED ORAL at 09:01

## 2024-01-19 RX ADMIN — OXYCODONE HYDROCHLORIDE 10 MG: 10 TABLET ORAL at 09:01

## 2024-01-19 RX ADMIN — PRENATAL VIT W/ FE FUMARATE-FA TAB 27-0.8 MG 1 TABLET: 27-0.8 TAB at 08:01

## 2024-01-19 RX ADMIN — FAMOTIDINE 40 MG: 20 TABLET ORAL at 02:01

## 2024-01-19 RX ADMIN — ACETAMINOPHEN 650 MG: 325 TABLET, FILM COATED ORAL at 01:01

## 2024-01-19 RX ADMIN — OXYCODONE HYDROCHLORIDE 10 MG: 10 TABLET ORAL at 12:01

## 2024-01-19 NOTE — ANESTHESIA POSTPROCEDURE EVALUATION
Anesthesia Post Evaluation    Patient: Jane Muller    Procedure(s) Performed: Procedure(s) (LRB):   SECTION (N/A)    Final Anesthesia Type: spinal      Patient location during evaluation: labor & delivery  Patient participation: Yes- Able to Participate  Level of consciousness: awake and alert  Post-procedure vital signs: reviewed and stable  Pain management: adequate  Airway patency: patent  FRANKIE mitigation strategies: Multimodal analgesia  PONV status at discharge: No PONV  Anesthetic complications: no      Cardiovascular status: blood pressure returned to baseline, hemodynamically stable and stable  Respiratory status: unassisted, room air and spontaneous ventilation  Hydration status: euvolemic  Follow-up not needed.              Vitals Value Taken Time   /62 24 1217   Temp 36.5 °C (97.7 °F) 24 1217   Pulse 75 24 1217   Resp 17 24 1217   SpO2 98 % 24 1217         Event Time   Out of Recovery 10:00:00         Pain/Wei Score: Pain Rating Prior to Med Admin: 7 (2024  9:32 AM)  Pain Rating Post Med Admin: 5 (2024 10:06 AM)

## 2024-01-19 NOTE — PROGRESS NOTES
POSTPARTUM PROGRESS NOTE    Subjective:     POD#: 1   Procedure: Repeat LTCS   EGA: 39w0d   N/V: No   F/C: No   Abd Pain: Mild, well-controlled with oral pain medication   Lochia: Mild   Voiding: Yes   Ambulating: Yes   Bowel fnc: Yes   Contraception: Per primary OB     Objective:      Temp:  [97.8 °F (36.6 °C)-98.5 °F (36.9 °C)] 98.5 °F (36.9 °C)  Pulse:  [68-87] 87  Resp:  [16-18] 16  SpO2:  [95 %-100 %] 95 %  BP: ()/(50-79) 99/59    Abdomen: Soft, appropriately tender   Uterus: Firm, no fundal tenderness   Incision: Bandage in place without shadowing     Lab Review    Recent Labs   Lab 24   *   K 3.9      CO2 18*   BUN 15   CREATININE 0.7      PROT 7.1   BILITOT 0.4   ALKPHOS 114   ALT 9*   AST 17         Recent Labs   Lab 24  0215 24  0541 24  0534   WBC 15.97* 9.66 12.04   HGB 9.7* 9.4* 7.3*   HCT 32.1* 31.5* 24.3*   MCV 78* 78* 79*    220 200           I/O    Intake/Output Summary (Last 24 hours) at 2024 0712  Last data filed at 2024 0300  Gross per 24 hour   Intake 1800 ml   Output 2015 ml   Net -215 ml          Assessment and Plan:   Postpartum care:  - Patient doing well.  - Continue routine management and advances.    Anemia   - H/H as above  - QBL: 675 cc  - asymptomatic  - iron/colace     Gestational Diabetes  - Fasting B  - Will need 2 hour GTT at postpartum visit       Linn Morris MD   OB/GYN PGY-2

## 2024-01-19 NOTE — PLAN OF CARE
Problem: Adult Inpatient Plan of Care  Goal: Plan of Care Review  Outcome: Ongoing, Progressing  Flowsheets (Taken 1/19/2024 1713)  Plan of Care Reviewed With:   patient   spouse   Pt free from falls, injury or any further trauma throughout shift. VSS. Fundus midline and firm, moderate lochia rubra. Continued medications as ordered. Pain adequately controlled with medications. Assistance with feeding provided as needed. Pt in no distress. Will cont to monitor.

## 2024-01-20 VITALS
TEMPERATURE: 98 F | SYSTOLIC BLOOD PRESSURE: 115 MMHG | HEART RATE: 82 BPM | WEIGHT: 191.38 LBS | OXYGEN SATURATION: 99 % | DIASTOLIC BLOOD PRESSURE: 58 MMHG | HEIGHT: 65 IN | BODY MASS INDEX: 31.89 KG/M2 | RESPIRATION RATE: 18 BRPM

## 2024-01-20 PROCEDURE — 25000003 PHARM REV CODE 250

## 2024-01-20 RX ADMIN — DOCUSATE SODIUM 200 MG: 100 CAPSULE, LIQUID FILLED ORAL at 08:01

## 2024-01-20 RX ADMIN — PRENATAL VIT W/ FE FUMARATE-FA TAB 27-0.8 MG 1 TABLET: 27-0.8 TAB at 08:01

## 2024-01-20 RX ADMIN — FAMOTIDINE 40 MG: 20 TABLET ORAL at 08:01

## 2024-01-20 RX ADMIN — ACETAMINOPHEN 650 MG: 325 TABLET, FILM COATED ORAL at 12:01

## 2024-01-20 RX ADMIN — IBUPROFEN 800 MG: 400 TABLET, FILM COATED ORAL at 05:01

## 2024-01-20 RX ADMIN — SIMETHICONE 80 MG: 80 TABLET, CHEWABLE ORAL at 03:01

## 2024-01-20 RX ADMIN — ACETAMINOPHEN 650 MG: 325 TABLET, FILM COATED ORAL at 08:01

## 2024-01-20 NOTE — DISCHARGE SUMMARY
Delivery Discharge Summary  Obstetrics      Primary OB Clinician: Zuleyka Barrios MD      Admission date: 2024  Discharge date: 2024    Disposition: To home, self care    Discharge Diagnosis List:      Patient Active Problem List   Diagnosis    Fibrocystic breast    History of weight loss surgery    Gestational diabetes mellitus (GDM) in second trimester     delivery delivered    Status post repeat low transverse  section    Chronic GERD    Pre-diabetes    Anemia affecting pregnancy in third trimester       Procedure: [facility]    Hospital Course:  Jane Muller is a 33 y.o. now , POD #2 who was admitted on 2024 at 39w0d for scheduled repeat LTCS. Patient was subsequently admitted to labor and delivery unit with signed consents. IUP c/b h/o CS x2, A1GDM, anemia.    Delivery via  was performed without complications. Please see delivery note for further details.     Her postpartum course was complicated by acute on chronic anemia. She remained asymptomatic, and iron/colace was continued. On discharge day, patient's pain is controlled with oral pain medications. Pt is tolerating ambulation without SOB or CP, and regular diet without N/V. Reports lochia is mild. Denies any HA, vision changes, F/C, LE swelling. Denies any breast pain/soreness.    Pt in stable condition and ready for discharge. She has been instructed to start and/or continue medications and follow up with her obstetrics provider as listed below.    Temp:  [97.7 °F (36.5 °C)-99.1 °F (37.3 °C)] 97.9 °F (36.6 °C)  Pulse:  [75-83] 81  Resp:  [16-18] 16  SpO2:  [96 %-98 %] 96 %  BP: (102-114)/(56-62) 106/59    Abdomen: Soft, appropriately tender   Uterus: Firm, no fundal tenderness   Incision: Bandage in place without shadowing         Pertinent studies:  CBC  Recent Labs   Lab 24  0215 24  0541 24  0534   WBC 15.97* 9.66 12.04   HGB 9.7* 9.4* 7.3*   HCT 32.1* 31.5* 24.3*   MCV 78*  "78* 79*    220 200          Temp:  [97.7 °F (36.5 °C)-99.1 °F (37.3 °C)] 97.9 °F (36.6 °C)  Pulse:  [75-83] 81  Resp:  [16-18] 16  SpO2:  [96 %-98 %] 96 %  BP: (102-114)/(56-62) 106/59   Physical Exam:   Abdomen: Soft, appropriately tender   Uterus: Firm, no fundal tenderness   Incision: Bandage in place without shadowing       Immunization History   Administered Date(s) Administered    COVID-19, MRNA, LN-S, PF (MODERNA FULL 0.5 ML DOSE) 2021, 2021, 2021    Tdap 2020, 2022, 2023        Delivery:    Episiotomy:     Lacerations:     Repair suture:     Repair # of packets:     Blood loss (ml):       Birth information:  YOB: 2023   Time of birth: 7:11 AM   Sex: male   Delivery type: , Low Transverse   Gestational Age: 39w0d     Measurements    Weight: 3250 g  Weight (lbs): 7 lb 2.6 oz  Length: 52.1 cm  Length (in): 20.5"  Head circumference: 34.9 cm  Chest circumference: 33 cm         Delivery Clinician: Delivery Providers    Delivering clinician: Zuleyka Barrios MD   Provider Role    Madhuri Carlin MD Singer, Emily, MD Beshenich, Kayla, RN Barcia-Pique, Melissa A, RN Crouch, Callie R.               Additional  information:  Forceps:    Vacuum:    Breech:    Observed anomalies      Living?:     Apgars    Living status: Living  Apgar Component Scores:  1 min.:  5 min.:  10 min.:  15 min.:  20 min.:    Skin color:  0  1       Heart rate:  2  2       Reflex irritability:  2  2       Muscle tone:  2  2       Respiratory effort:  2  2       Total:  8  9       Apgars assigned by: JOSEP JACKSON RN         Placenta: Delivered:       appearance    Patient Instructions:   Current Discharge Medication List        START taking these medications    Details   acetaminophen (TYLENOL) 325 MG tablet Take 2 tablets (650 mg total) by mouth every 6 (six) hours.  Qty: 60 tablet, Refills: 0      docusate sodium (COLACE) 100 MG capsule Take 1 " capsule (100 mg total) by mouth 2 (two) times daily. for 14 days  Qty: 28 capsule, Refills: 0      ibuprofen (ADVIL,MOTRIN) 600 MG tablet Take 1 tablet (600 mg total) by mouth every 6 (six) hours.  Qty: 60 tablet, Refills: 1      oxyCODONE (ROXICODONE) 5 MG immediate release tablet Take 1 tablet (5 mg total) by mouth every 4 (four) hours as needed for Pain.  Qty: 30 tablet, Refills: 0    Comments: Quantity prescribed more than 7 day supply? No      simethicone (MYLICON) 80 MG chewable tablet Take 1 tablet (80 mg total) by mouth 3 (three) times daily after meals.  Qty: 15 tablet, Refills: 0           CONTINUE these medications which have NOT CHANGED    Details   clotrimazole (LOTRIMIN) 1 % Crea Place 1 suppository (1 applicator total) vaginally every evening. For 7 nights. Place right before bed.  Qty: 45 g, Refills: 0    Associated Diagnoses: Yeast infection      esomeprazole (NEXIUM) 20 MG capsule Take 1 capsule (20 mg total) by mouth before breakfast.  Qty: 90 capsule, Refills: 1    Comments: Please cancel the 40 mg mexium      ferrous sulfate (FEOSOL) Tab tablet Take 1 tablet by mouth daily with breakfast.      ondansetron (ZOFRAN-ODT) 8 MG TbDL Take 1 tablet (8 mg total) by mouth 2 (two) times daily.  Qty: 20 tablet, Refills: 0    Associated Diagnoses: Nausea      prenatal 25/iron fum/folic/dha (PRENATAL-1 ORAL) Take by mouth once daily.             Discharge Procedure Orders   Diet Adult Regular     No driving until:   Order Comments: No driving until 24 hours since last narcotic/opioid     Pelvic Rest     Notify your health care provider if you experience any of the following:  temperature >100.4     Notify your health care provider if you experience any of the following:  persistent nausea and vomiting or diarrhea     Notify your health care provider if you experience any of the following:  severe uncontrolled pain     Notify your health care provider if you experience any of the following:  redness,  tenderness, or signs of infection (pain, swelling, redness, odor or green/yellow discharge around incision site)     Notify your health care provider if you experience any of the following:  severe persistent headache     Notify your health care provider if you experience any of the following:   Order Comments: Heavy bleeding saturating >1 pad per hour for 2 hours     Activity as tolerated        Follow-up Information       Zuleyka Barrios MD Follow up in 6 week(s).    Specialty: Obstetrics and Gynecology  Why: Postpartum visit  Contact information:  [facility] [address] [address] [phone]                              Manohar Lawrence MD PGY1  Obstetrics and Gynecology

## 2024-01-20 NOTE — PLAN OF CARE
Problem: Diabetes in Pregnancy  Goal: Blood Glucose Level Within Targeted Range  Outcome: Met     Problem: Adult Inpatient Plan of Care  Goal: Plan of Care Review  Outcome: Met  Goal: Patient-Specific Goal (Individualized)  Outcome: Met  Goal: Absence of Hospital-Acquired Illness or Injury  Outcome: Met  Goal: Optimal Comfort and Wellbeing  Outcome: Met  Goal: Readiness for Transition of Care  Outcome: Met     Problem:  Fall Injury Risk  Goal: Absence of Fall, Infant Drop and Related Injury  Outcome: Met     Problem: Adjustment to Role Transition (Postpartum  Delivery)  Goal: Successful Maternal Role Transition  Outcome: Met     Problem: Bleeding (Postpartum  Delivery)  Goal: Hemostasis  Outcome: Met     Problem: Infection (Postpartum  Delivery)  Goal: Absence of Infection Signs and Symptoms  Outcome: Met     Problem: Pain (Postpartum  Delivery)  Goal: Acceptable Pain Control  Outcome: Met     Problem: Postoperative Nausea and Vomiting (Postpartum  Delivery)  Goal: Nausea and Vomiting Relief  Outcome: Met     Problem: Postoperative Urinary Retention (Postpartum  Delivery)  Goal: Effective Urinary Elimination  Outcome: Met     Problem: Infection  Goal: Absence of Infection Signs and Symptoms  Outcome: Met     Problem: Skin Injury Risk Increased  Goal: Skin Health and Integrity  Outcome: Met

## 2024-01-22 ENCOUNTER — PATIENT MESSAGE (OUTPATIENT)
Dept: OBSTETRICS AND GYNECOLOGY | Facility: OTHER | Age: 34
End: 2024-01-22
Payer: COMMERCIAL

## 2024-01-22 LAB
BLD PROD TYP BPU: NORMAL
BLD PROD TYP BPU: NORMAL
BLOOD UNIT EXPIRATION DATE: NORMAL
BLOOD UNIT EXPIRATION DATE: NORMAL
BLOOD UNIT TYPE CODE: 6200
BLOOD UNIT TYPE CODE: 6200
BLOOD UNIT TYPE: NORMAL
BLOOD UNIT TYPE: NORMAL
CODING SYSTEM: NORMAL
CODING SYSTEM: NORMAL
CROSSMATCH INTERPRETATION: NORMAL
CROSSMATCH INTERPRETATION: NORMAL
DISPENSE STATUS: NORMAL
DISPENSE STATUS: NORMAL
TRANS ERYTHROCYTES VOL PATIENT: NORMAL ML
TRANS ERYTHROCYTES VOL PATIENT: NORMAL ML

## 2024-01-22 NOTE — PHYSICIAN QUERY
PT Name: Jane Muller  MR #: [id number]    DOCUMENTATION CLARIFICATION      CDS/: NERIS Dixon,RNC-MNN        Contact information:[email or web address]    This form is a permanent document in the medical record.      Query Date: 2024    By submitting this query, we are merely seeking further clarification of documentation. Please utilize your independent clinical judgment when addressing the question(s) below.    The Medical Record contains the following:   Indicators  Supporting Clinical Findings Location in Medical Record   X Anemia documented Her postpartum course was complicated by acute on chronic anemia  Discharge Summary    X H&H Recent Labs   Lab 24  0215 24  0541 24  0534   WBC 15.97* 9.66 12.04   HGB 9.7* 9.4* 7.3*   HCT 32.1* 31.5* 24.3*   MCV 78* 78* 79*    220 200     Discharge Summary     BP                    HR      Bleeding     X Procedure/Surgery Performed/EBL Procedure(s) (LRB):   SECTION    Blood Loss: 675cc  Op note     Transfusion(s)     X Acute/Chronic illness Delivery via  was performed without complications  Discharge Summary    X Treatments She remained asymptomatic, and iron/colace was continued.  Discharge Summary     Other       Provider, please further specify the diagnosis of  acute on chronic anemia.  [   X] Acute blood loss anemia with chronic anemia    [   ] Other : _________________   [   ] Clinically Undetermined            Please document in your progress notes daily for the duration of treatment, until resolved, and include in your discharge summary.    Form No. [id number]

## 2024-01-24 ENCOUNTER — POSTPARTUM VISIT (OUTPATIENT)
Dept: OBSTETRICS AND GYNECOLOGY | Facility: CLINIC | Age: 34
End: 2024-01-24
Payer: COMMERCIAL

## 2024-01-24 ENCOUNTER — PATIENT MESSAGE (OUTPATIENT)
Dept: OBSTETRICS AND GYNECOLOGY | Facility: CLINIC | Age: 34
End: 2024-01-24
Payer: COMMERCIAL

## 2024-01-24 VITALS
SYSTOLIC BLOOD PRESSURE: 122 MMHG | WEIGHT: 185.88 LBS | DIASTOLIC BLOOD PRESSURE: 78 MMHG | BODY MASS INDEX: 30.93 KG/M2

## 2024-01-24 PROCEDURE — 99999 PR PBB SHADOW E&M-EST. PATIENT-LVL III: CPT | Mod: PBBFAC,,, | Performed by: NURSE PRACTITIONER

## 2024-01-24 PROCEDURE — 0502F SUBSEQUENT PRENATAL CARE: CPT | Mod: CPTII,S$GLB,, | Performed by: NURSE PRACTITIONER

## 2024-01-24 NOTE — PROGRESS NOTES
History & Physical  Gynecology      SUBJECTIVE:     Chief Complaint: Wound Check     History of Present Illness: Patient presents to clinic for  bandage removal. She is 6 days PP following rLTCS. Doing well, no reported postoperative issues. She is not breastfeeding. Mood is stable.         Review of patient's allergies indicates:  No Known Allergies    Past Medical History:   Diagnosis Date    Anxiety      Past Surgical History:   Procedure Laterality Date     SECTION N/A 10/1/2020    Procedure:  SECTION;  Surgeon: Erwin Brandt MD;  [facility];  Service: OB/GYN;  Laterality: N/A;     SECTION N/A 2022    Procedure:  SECTION;  Surgeon: Radha Easley MD;  [facility];  Service: OB/GYN;  Laterality: N/A;     SECTION N/A 2024    Procedure:  SECTION;  Surgeon: Zuleyka Barrios MD;  [facility];  Service: OB/GYN;  Laterality: N/A;  Repeat C Section, hx of gastric wgt loss surgery    STOMACH SURGERY  2017    Dr Owens - sleeve gastrectomy - was 190lbs     OB History          3    Para   3    Term   3       0    AB   0    Living   3         SAB   0    IAB   0    Ectopic   0    Multiple   0    Live Births   3           Obstetric Comments   Menarche 15             Family History   Problem Relation Age of Onset    Heart attack Father     Hypertension Father     No Known Problems Brother     Breast cancer Neg Hx     Colon cancer Neg Hx     Ovarian cancer Neg Hx      Social History     Tobacco Use    Smoking status: Former    Smokeless tobacco: Never   Substance Use Topics    Alcohol use: Not Currently     Comment: social    Drug use: No       Current Outpatient Medications   Medication Sig    acetaminophen (TYLENOL) 325 MG tablet Take 2 tablets (650 mg total) by mouth every 6 (six) hours.    clotrimazole (LOTRIMIN) 1 % Crea Place 1 suppository (1 applicator total) vaginally every evening. For 7 nights. Place right  before bed.    docusate sodium (COLACE) 100 MG capsule Take 1 capsule (100 mg total) by mouth 2 (two) times daily. for 14 days    ferrous sulfate (FEOSOL) Tab tablet Take 1 tablet by mouth daily with breakfast.    ibuprofen (ADVIL,MOTRIN) 600 MG tablet Take 1 tablet (600 mg total) by mouth every 6 (six) hours.    ondansetron (ZOFRAN-ODT) 8 MG TbDL Take 1 tablet (8 mg total) by mouth 2 (two) times daily.    oxyCODONE (ROXICODONE) 5 MG immediate release tablet Take 1 tablet (5 mg total) by mouth every 4 (four) hours as needed for Pain.    prenatal 25/iron fum/folic/dha (PRENATAL-1 ORAL) Take by mouth once daily.    simethicone (MYLICON) 80 MG chewable tablet Take 1 tablet (80 mg total) by mouth 3 (three) times daily after meals.    esomeprazole (NEXIUM) 20 MG capsule Take 1 capsule (20 mg total) by mouth before breakfast.     No current facility-administered medications for this visit.         Review of Systems:  Review of Systems   Constitutional:  Negative for activity change, appetite change, chills, fatigue and fever.   HENT:  Negative for mouth sores.    Eyes:  Negative for visual disturbance.   Respiratory:  Negative for cough and shortness of breath.    Cardiovascular:  Negative for chest pain and leg swelling.   Gastrointestinal:  Negative for abdominal pain, constipation, diarrhea, nausea, vomiting and reflux.   Endocrine: Negative for hyperthyroidism and hypothyroidism.   Genitourinary:  Negative for dysuria, flank pain, frequency, genital sores, hematuria, menstrual problem, pelvic pain, vaginal bleeding, vaginal discharge, vaginal pain, vaginal dryness and vaginal odor.   Musculoskeletal:  Negative for arthralgias, back pain and myalgias.   Integumentary:  Negative for rash, breast mass and breast tenderness.   Neurological:  Negative for headaches.   Hematological:  Negative for adenopathy. Does not bruise/bleed easily.   Psychiatric/Behavioral:  Negative for depression. The patient is not  nervous/anxious.    Breast: Negative for asymmetry, mass and tenderness       OBJECTIVE:     Physical Exam:  Physical Exam  Constitutional:       General: She is not in acute distress.     Appearance: She is well-developed. She is not diaphoretic.   HENT:      Head: Normocephalic and atraumatic.      Nose: Nose normal.   Eyes:      Conjunctiva/sclera: Conjunctivae normal.      Pupils: Pupils are equal, round, and reactive to light.   Neck:      Thyroid: No thyromegaly.      Vascular: No JVD.      Trachea: No tracheal deviation.   Cardiovascular:      Rate and Rhythm: Normal rate and regular rhythm.      Heart sounds: Normal heart sounds. No murmur heard.     No friction rub. No gallop.   Pulmonary:      Effort: Pulmonary effort is normal. No respiratory distress.      Breath sounds: Normal breath sounds. No stridor. No wheezing or rales.   Chest:      Chest wall: No tenderness.   Abdominal:      General: Bowel sounds are normal. There is no distension.      Palpations: Abdomen is soft. There is no mass.      Tenderness: There is no abdominal tenderness. There is no guarding or rebound.      Hernia: No hernia is present.   Genitourinary:     Vagina: Normal.   Musculoskeletal:         General: No tenderness. Normal range of motion.      Cervical back: Normal range of motion and neck supple.   Lymphadenopathy:      Cervical: No cervical adenopathy.   Skin:     General: Skin is warm and dry.      Capillary Refill: Capillary refill takes less than 2 seconds.      Coloration: Skin is not pale.      Findings: No erythema or rash.          Neurological:      Mental Status: She is alert and oriented to person, place, and time.      Sensory: No sensory deficit.      Motor: No abnormal muscle tone.      Coordination: Coordination normal.      Deep Tendon Reflexes: Reflexes normal.   Psychiatric:         Behavior: Behavior normal.         Thought Content: Thought content normal.         Judgment: Judgment normal.            ASSESSMENT:       ICD-10-CM ICD-9-CM    1. Postpartum care and examination  Z39.2 V24.2              Plan:      Bandage removed from site, site healing without issue.    FU in 1 week for wound check.    Counseling time: 20 minutes       ODALYS [facility]

## 2024-02-05 ENCOUNTER — LAB VISIT (OUTPATIENT)
Dept: LAB | Facility: OTHER | Age: 34
End: 2024-02-05
Attending: STUDENT IN AN ORGANIZED HEALTH CARE EDUCATION/TRAINING PROGRAM
Payer: COMMERCIAL

## 2024-02-05 ENCOUNTER — POSTPARTUM VISIT (OUTPATIENT)
Dept: OBSTETRICS AND GYNECOLOGY | Facility: CLINIC | Age: 34
End: 2024-02-05
Payer: COMMERCIAL

## 2024-02-05 ENCOUNTER — TELEPHONE (OUTPATIENT)
Dept: OBSTETRICS AND GYNECOLOGY | Facility: CLINIC | Age: 34
End: 2024-02-05
Payer: COMMERCIAL

## 2024-02-05 VITALS
WEIGHT: 181.88 LBS | DIASTOLIC BLOOD PRESSURE: 74 MMHG | SYSTOLIC BLOOD PRESSURE: 110 MMHG | BODY MASS INDEX: 30.27 KG/M2

## 2024-02-05 DIAGNOSIS — R10.9 ABDOMINAL PAIN, UNSPECIFIED ABDOMINAL LOCATION: ICD-10-CM

## 2024-02-05 LAB
ALBUMIN SERPL BCP-MCNC: 3.6 G/DL (ref 3.5–5.2)
ALP SERPL-CCNC: 112 U/L (ref 55–135)
ALT SERPL W/O P-5'-P-CCNC: 23 U/L (ref 10–44)
ANION GAP SERPL CALC-SCNC: 11 MMOL/L (ref 8–16)
AST SERPL-CCNC: 22 U/L (ref 10–40)
BASOPHILS # BLD AUTO: 0.06 K/UL (ref 0–0.2)
BASOPHILS NFR BLD: 0.7 % (ref 0–1.9)
BILIRUB SERPL-MCNC: 0.4 MG/DL (ref 0.1–1)
BUN SERPL-MCNC: 19 MG/DL (ref 6–20)
CALCIUM SERPL-MCNC: 9.3 MG/DL (ref 8.7–10.5)
CHLORIDE SERPL-SCNC: 107 MMOL/L (ref 95–110)
CO2 SERPL-SCNC: 22 MMOL/L (ref 23–29)
CREAT SERPL-MCNC: 0.9 MG/DL (ref 0.5–1.4)
DIFFERENTIAL METHOD BLD: ABNORMAL
EOSINOPHIL # BLD AUTO: 0.2 K/UL (ref 0–0.5)
EOSINOPHIL NFR BLD: 2 % (ref 0–8)
ERYTHROCYTE [DISTWIDTH] IN BLOOD BY AUTOMATED COUNT: 17.3 % (ref 11.5–14.5)
EST. GFR  (NO RACE VARIABLE): >60 ML/MIN/1.73 M^2
GLUCOSE SERPL-MCNC: 109 MG/DL (ref 70–110)
HCT VFR BLD AUTO: 30.5 % (ref 37–48.5)
HGB BLD-MCNC: 9 G/DL (ref 12–16)
IMM GRANULOCYTES # BLD AUTO: 0.02 K/UL (ref 0–0.04)
IMM GRANULOCYTES NFR BLD AUTO: 0.2 % (ref 0–0.5)
LIPASE SERPL-CCNC: 52 U/L (ref 4–60)
LYMPHOCYTES # BLD AUTO: 2.3 K/UL (ref 1–4.8)
LYMPHOCYTES NFR BLD: 28.8 % (ref 18–48)
MCH RBC QN AUTO: 23.3 PG (ref 27–31)
MCHC RBC AUTO-ENTMCNC: 29.5 G/DL (ref 32–36)
MCV RBC AUTO: 79 FL (ref 82–98)
MONOCYTES # BLD AUTO: 0.8 K/UL (ref 0.3–1)
MONOCYTES NFR BLD: 10.1 % (ref 4–15)
NEUTROPHILS # BLD AUTO: 4.7 K/UL (ref 1.8–7.7)
NEUTROPHILS NFR BLD: 58.2 % (ref 38–73)
NRBC BLD-RTO: 0 /100 WBC
PLATELET # BLD AUTO: 516 K/UL (ref 150–450)
PMV BLD AUTO: 10.1 FL (ref 9.2–12.9)
POTASSIUM SERPL-SCNC: 4 MMOL/L (ref 3.5–5.1)
PROT SERPL-MCNC: 7.8 G/DL (ref 6–8.4)
RBC # BLD AUTO: 3.86 M/UL (ref 4–5.4)
SODIUM SERPL-SCNC: 140 MMOL/L (ref 136–145)
WBC # BLD AUTO: 8.01 K/UL (ref 3.9–12.7)

## 2024-02-05 PROCEDURE — 83690 ASSAY OF LIPASE: CPT | Performed by: STUDENT IN AN ORGANIZED HEALTH CARE EDUCATION/TRAINING PROGRAM

## 2024-02-05 PROCEDURE — 1111F DSCHRG MED/CURRENT MED MERGE: CPT | Mod: CPTII,S$GLB,, | Performed by: STUDENT IN AN ORGANIZED HEALTH CARE EDUCATION/TRAINING PROGRAM

## 2024-02-05 PROCEDURE — 81001 URINALYSIS AUTO W/SCOPE: CPT | Performed by: STUDENT IN AN ORGANIZED HEALTH CARE EDUCATION/TRAINING PROGRAM

## 2024-02-05 PROCEDURE — 87086 URINE CULTURE/COLONY COUNT: CPT | Performed by: STUDENT IN AN ORGANIZED HEALTH CARE EDUCATION/TRAINING PROGRAM

## 2024-02-05 PROCEDURE — 36415 COLL VENOUS BLD VENIPUNCTURE: CPT | Performed by: STUDENT IN AN ORGANIZED HEALTH CARE EDUCATION/TRAINING PROGRAM

## 2024-02-05 PROCEDURE — 85025 COMPLETE CBC W/AUTO DIFF WBC: CPT | Performed by: STUDENT IN AN ORGANIZED HEALTH CARE EDUCATION/TRAINING PROGRAM

## 2024-02-05 PROCEDURE — 80053 COMPREHEN METABOLIC PANEL: CPT | Performed by: STUDENT IN AN ORGANIZED HEALTH CARE EDUCATION/TRAINING PROGRAM

## 2024-02-05 PROCEDURE — 99999 PR PBB SHADOW E&M-EST. PATIENT-LVL III: CPT | Mod: PBBFAC,,, | Performed by: STUDENT IN AN ORGANIZED HEALTH CARE EDUCATION/TRAINING PROGRAM

## 2024-02-05 PROCEDURE — 99213 OFFICE O/P EST LOW 20 MIN: CPT | Mod: 24,S$GLB,, | Performed by: STUDENT IN AN ORGANIZED HEALTH CARE EDUCATION/TRAINING PROGRAM

## 2024-02-05 RX ORDER — OXYCODONE HYDROCHLORIDE 5 MG/1
5 TABLET ORAL EVERY 6 HOURS PRN
Qty: 10 TABLET | Refills: 0 | Status: SHIPPED | OUTPATIENT
Start: 2024-02-05

## 2024-02-05 RX ORDER — SIMETHICONE 80 MG
80 TABLET,CHEWABLE ORAL
Qty: 15 TABLET | Refills: 2 | Status: SHIPPED | OUTPATIENT
Start: 2024-02-05

## 2024-02-05 NOTE — PROGRESS NOTES
OBSTETRICS AND GYNECOLOGY    Subjective:      Chief Complaint:  Pain    HPI:  Jane Muller is an 33 y.o.  presenting with concerns of post-op pain. Started about 1 week ago. Feels like something is blocked. Feels gas is trapped. Starts every day in afternoon. Lasting hours once starts. Localized across abdomen, not necessarily pelvis. No constipation, regular daily bowel movements. Taking simethicone. Last night the worst pain, could not walk. Experienced a gush of blood last night. Dark red blood. Small clots. Before last night, the bleeding had been improving. Slight dysuria. No fevers. Some chills last night. Feels fine in morning. Feels fine currently. Denies engorgement. No redness, rash on breasts. No n/v. No abnormal vaginal discharge, odor, or irritation. No exudate/bleeding from incision. Heating pad, belly binder helps. No identifiable triggering events. No new activities. Getting up from sitting makes the pain worse. Does not feel like period cramp. Not triggered by meals/eating. No pain currently. Still has appendix, gallbladder. Has been taking tylenol which helps a little. Presents with mother.    Review of systems:  Denies fever, nausea/vomiting, chest pain, shortness of breath, constipation, diarrhea, abnormal vaginal discharge.     OB History    Para Term  AB Living   3 3 3 0 0 3   SAB IAB Ectopic Multiple Live Births   0 0 0 0 3      # Outcome Date GA Lbr Maxwell/2nd Weight Sex Delivery Anes PTL Lv   3 Term 24 39w0d  3.25 kg (7 lb 2.6 oz) M CS-LTranv Spinal  TRANG   2 Term 22 39w2d  2.95 kg (6 lb 8.1 oz) F CS-LTranv Spinal N TRANG   1 Term 10/01/20 39w1d  3.26 kg (7 lb 3 oz) M CS-LTranv Spinal  TRANG      Obstetric Comments   Menarche 15     Past Medical History:   Diagnosis Date    Anxiety      Past Surgical History:   Procedure Laterality Date     SECTION N/A 10/1/2020    Procedure:  SECTION;  Surgeon: Erwin Brandt MD;  [facility];   Service: OB/GYN;  Laterality: N/A;     SECTION N/A 2022    Procedure:  SECTION;  Surgeon: Radha Easley MD;  Location: [facility];  Service: OB/GYN;  Laterality: N/A;     SECTION N/A 2024    Procedure:  SECTION;  Surgeon: Zuleyka Barrios MD;  Location: [facility];  Service: OB/GYN;  Laterality: N/A;  Repeat C Section, hx of gastric wgt loss surgery    STOMACH SURGERY  2017    Dr Owens - sleeve gastrectomy - was 190lbs     Family History   Problem Relation Age of Onset    Heart attack Father     Hypertension Father     No Known Problems Brother     Breast cancer Neg Hx     Colon cancer Neg Hx     Ovarian cancer Neg Hx        Allergies: Review of patient's allergies indicates:  No Known Allergies    Medications:   Current Outpatient Medications   Medication Sig Dispense Refill    acetaminophen (TYLENOL) 325 MG tablet Take 2 tablets (650 mg total) by mouth every 6 (six) hours. 60 tablet 0    clotrimazole (LOTRIMIN) 1 % Crea Place 1 suppository (1 applicator total) vaginally every evening. For 7 nights. Place right before bed. 45 g 0    esomeprazole (NEXIUM) 20 MG capsule Take 1 capsule (20 mg total) by mouth before breakfast. 90 capsule 1    ferrous sulfate (FEOSOL) Tab tablet Take 1 tablet by mouth daily with breakfast.      ibuprofen (ADVIL,MOTRIN) 600 MG tablet Take 1 tablet (600 mg total) by mouth every 6 (six) hours. 60 tablet 1    ondansetron (ZOFRAN-ODT) 8 MG TbDL Take 1 tablet (8 mg total) by mouth 2 (two) times daily. 20 tablet 0    oxyCODONE (ROXICODONE) 5 MG immediate release tablet Take 1 tablet (5 mg total) by mouth every 4 (four) hours as needed for Pain. 30 tablet 0    prenatal 25/iron fum/folic/dha (PRENATAL-1 ORAL) Take by mouth once daily.      simethicone (MYLICON) 80 MG chewable tablet Take 1 tablet (80 mg total) by mouth 3 (three) times daily after meals. 15 tablet 0     No current facility-administered medications for this visit.       Social  History     Tobacco Use    Smoking status: Former    Smokeless tobacco: Never   Substance Use Topics    Alcohol use: Not Currently     Comment: social       Objective:   /74 (BP Location: Right arm, Patient Position: Sitting, BP Method: Medium (Manual))   Wt 82.5 kg (181 lb 14.1 oz)   LMP 04/16/2023   Breastfeeding No   BMI 30.27 kg/m²   Physical Exam    GENERAL: Alert, well dressed, well nourished. Appropriate mood and affect. Pleasant.  HEENT: Normocephalic, atraumatic. Extraocular movements intact. Hearing and vision grossly intact.   PULMONARY: No respiratory distress. No use of accessory muscles of respiration. No cyanosis. Speaking comfortably in full sentences.   BREASTS:  Symmetric, soft, no engorgement, no rash, redness, or warmth.  CARDIAC: Well perfused.    ABDOMEN: Soft. Nonacute, non-distended. No rebound, guarding, or rigidity. No CVAT. Pfannenstiel incision well approximated, clean, dry, no exudate, not bleeding, no surrounding skin changes, no warmth or redness; no evidence of infection. Pain localized to RLQ, inferior to McBurney's point. Negative Tao's, negative Rosvings.  EXTREMITIES: No edema. No visible rashes.   NEURO: Gait witnessed and WNL, bilateral dorsiflexion and plantarflexion 5/5 strength.    PELVIC:   EXTERNAL GENITALIA: No lesions or masses, non-erythematous.  URETHRA: Patent, no discharge.   VAGINA: Pink, moist, rugated, no discharge.   CERVIX: Nulliparous, no lesions or masses, os closed, about 2cc dark red blood in vault, no active bleeding, no blood or discharge per os, no cervical motion tenderness.   UTERUS: Firm, mobile, midline, non-tender.   ADNEXA: Non-tender, non-palpable.    Assessment/Plan:     - Ddx reviewed, including infectious, MSK, , GI sources, cystitis, nephrolithiasis, appendix, GB  - Exam not c/w endometritis/infection or post op infection/complication or SSI  - CBC, CMP, Lipase  - UA, culture  - ABD US  - Scheduled NSAIDs, can alternate with  tylenol; continue simethicone TID  Short course oxy PRN worse pain  - STRICT ER precautions reviewed, pt verbalized understanding    Follow up in 1 week or sooner PRN            As of April 1, 2021, the Cures Act has been passed nationally. This new law requires that all doctors progress notes, lab results, pathology reports and radiology reports be released IMMEDIATELY to the patient in the patient portal. That means that the results are released to you at the EXACT same time they are released to me. Therefore, with all of the patients that I have I am not able to reply to each patient exactly when the results come in. So there will be a delay from when you see the results to when I see them and have time to come up with a response to send you. Also I only see these results when I am on the computer at work. So if the results come in over the weekend or after 5 pm of a work day, I will not see them until the next business day. As you can tell, this is a challenge as a physician to give every patient the quick response they hope for and deserve. So please be patient!   Thanks for your understanding and patience.

## 2024-02-05 NOTE — TELEPHONE ENCOUNTER
PP pt c/o crippling stomach pains in the evening/night for about the past week.  Starts around 4pm then by 7pm cannot walk.  Feels similar to gas pains but no improvement with Mylicon.  Daily BMs.  Last night pain was worse than it has ever been and started bleeding heavily so she went to the main ER but left without being seen.  She bled thru a pad in 1 hour but it eventually slowed down.  Not currently in any pain but it usually subsides by morning.  No pain control with medication.  Voiding as usual, not holding bladder but states voiding is uncomfortable and has been since delivering.  Feels like she is retaining urine, has a pressure in bladder the entire time she voiding.  Denies dysuria.  Suggested voiding more frequently as bladder may be distended causing more discomfort and cramping.      Aware in the future she should go to the [facility].      Please advise, she would like to be seen.

## 2024-02-06 LAB
BACTERIA #/AREA URNS AUTO: ABNORMAL /HPF
BILIRUB UR QL STRIP: NEGATIVE
CLARITY UR REFRACT.AUTO: ABNORMAL
COLOR UR AUTO: ABNORMAL
GLUCOSE UR QL STRIP: NEGATIVE
HGB UR QL STRIP: ABNORMAL
KETONES UR QL STRIP: NEGATIVE
LEUKOCYTE ESTERASE UR QL STRIP: NEGATIVE
MICROSCOPIC COMMENT: ABNORMAL
NITRITE UR QL STRIP: NEGATIVE
PH UR STRIP: 5 [PH] (ref 5–8)
PROT UR QL STRIP: NEGATIVE
RBC #/AREA URNS AUTO: 5 /HPF (ref 0–4)
SP GR UR STRIP: 1.02 (ref 1–1.03)
URN SPEC COLLECT METH UR: ABNORMAL
WBC #/AREA URNS AUTO: 1 /HPF (ref 0–5)

## 2024-02-07 ENCOUNTER — TELEPHONE (OUTPATIENT)
Dept: OBSTETRICS AND GYNECOLOGY | Facility: CLINIC | Age: 34
End: 2024-02-07
Payer: COMMERCIAL

## 2024-02-07 LAB — BACTERIA UR CULT: NO GROWTH

## 2024-02-07 NOTE — TELEPHONE ENCOUNTER
Contacted patient Jane Muller yesterday, 2/6/24, at approximately 19:10.    Reported pain still occurring, but better now. Timing pattern remains. Upcoming ultrasound.

## 2024-02-08 ENCOUNTER — HOSPITAL ENCOUNTER (OUTPATIENT)
Dept: RADIOLOGY | Facility: HOSPITAL | Age: 34
Discharge: HOME | End: 2024-02-08
Attending: STUDENT IN AN ORGANIZED HEALTH CARE EDUCATION/TRAINING PROGRAM
Payer: COMMERCIAL

## 2024-02-08 DIAGNOSIS — R10.9 ABDOMINAL PAIN, UNSPECIFIED ABDOMINAL LOCATION: ICD-10-CM

## 2024-02-08 PROCEDURE — 76705 ECHO EXAM OF ABDOMEN: CPT | Mod: TC

## 2024-02-08 PROCEDURE — 76705 ECHO EXAM OF ABDOMEN: CPT | Mod: 26,,, | Performed by: RADIOLOGY

## 2024-06-06 ENCOUNTER — TELEPHONE (OUTPATIENT)
Dept: OBSTETRICS AND GYNECOLOGY | Facility: CLINIC | Age: 34
End: 2024-06-06
Payer: COMMERCIAL

## 2024-06-06 DIAGNOSIS — Z86.32 HISTORY OF GESTATIONAL DIABETES: Primary | ICD-10-CM

## 2024-06-06 NOTE — TELEPHONE ENCOUNTER
----- Message from Zuleyka Barrios MD sent at 6/6/2024  8:42 AM CDT -----  This patient's final postpartum visit was never scheduled.  She is in need of a postpartum 2 hour OB glucose test.    However, this far postpartum, let's instead schedule her for a lab visit for Hemoglobin A1c only. Order is in. Thank you!

## 2024-07-26 NOTE — PROGRESS NOTES
- reviewed 2nd trimester precautions  - 162 mg ASA daily until 36 weeks  -Taking levothyroxine- recent increase in dose, Lexapro, PNV, ASA  - fetal echo- 8/15/24  - growth scan 8/29/2024  - GDM classes/visit completed to start monitoring sugars, will report to diabetes in pregnancy  -  next visit 4 weeks   Presents at 28w2d for ELIZABETH. Excellent FM, deneis VB, LOF, ctx. 2 day log of BG, elevated postprandial- will send full week of Accuchecks next week. Discussed low glycemic index fruits/veg, high caloric, plant based fats. Growth scan ordered for 32 weeks gestation. FU in 2 weeks for ELIZABETH.

## 2024-09-13 ENCOUNTER — TELEPHONE (OUTPATIENT)
Dept: OBSTETRICS AND GYNECOLOGY | Facility: CLINIC | Age: 34
End: 2024-09-13
Payer: COMMERCIAL

## 2024-09-13 NOTE — TELEPHONE ENCOUNTER
----- Message from Zuleyka Barrios MD sent at 9/13/2024  8:05 AM CDT -----  Please schedule virtual visit to discuss contraception options.   Anytime in next 2-8 weeks.  Thank you!  11:45 or 3:45.  If its the 3:45 spot, please let her know I sometimes can try to call 12-1!        Left message for patient to give the office a call back to schedule a virtual visit.

## 2025-01-13 ENCOUNTER — TELEPHONE (OUTPATIENT)
Dept: RADIOLOGY | Facility: HOSPITAL | Age: 35
End: 2025-01-13
Payer: COMMERCIAL

## 2025-01-13 NOTE — TELEPHONE ENCOUNTER
----- Message from Elsa sent at 1/13/2025 10:06 AM CST -----  Regarding: seen today  Contact: Pt @ [phone]  Pt is calling to speak to someone in the office; asking to be seen today. No available appts in Epic. Pt is willing to see any provider that is available today. Please call soon. Thanks.         Patient's DX:Mass of right breast, unspecified quadrant [N63.10]      Jeanne with Dr. Niurka Herrmann @ [phone]

## 2025-01-14 ENCOUNTER — HOSPITAL ENCOUNTER (OUTPATIENT)
Dept: RADIOLOGY | Facility: HOSPITAL | Age: 35
Discharge: HOME | End: 2025-01-14
Attending: INTERNAL MEDICINE
Payer: COMMERCIAL

## 2025-01-14 DIAGNOSIS — N63.10 MASS OF RIGHT BREAST, UNSPECIFIED QUADRANT: ICD-10-CM

## 2025-01-14 PROCEDURE — 77062 BREAST TOMOSYNTHESIS BI: CPT | Mod: 26,,, | Performed by: RADIOLOGY

## 2025-01-14 PROCEDURE — 77066 DX MAMMO INCL CAD BI: CPT | Mod: TC

## 2025-01-14 PROCEDURE — 77066 DX MAMMO INCL CAD BI: CPT | Mod: 26,,, | Performed by: RADIOLOGY

## 2025-01-14 PROCEDURE — 76642 ULTRASOUND BREAST LIMITED: CPT | Mod: TC,RT

## 2025-01-14 PROCEDURE — 76642 ULTRASOUND BREAST LIMITED: CPT | Mod: 26,RT,, | Performed by: RADIOLOGY

## 2025-05-18 NOTE — TELEPHONE ENCOUNTER
----- Message from Diana Dolan sent at 5/4/2022  5:23 PM CDT -----  Please call patient and schedule weekly BPPs.  ----- Message -----  From: Radha Easley MD  Sent: 4/29/2022   4:33 PM CDT  To: [facility] Staff    Pt needs weekly BPPs for remainder of pregnancy.   She does not need any visits with me added on - just BPPs added - even on the weeks when she doesn't have a provider visit scheduled.   Orders are in .   Thank you!       unknown